# Patient Record
Sex: FEMALE | Race: ASIAN | Employment: UNEMPLOYED | URBAN - METROPOLITAN AREA
[De-identification: names, ages, dates, MRNs, and addresses within clinical notes are randomized per-mention and may not be internally consistent; named-entity substitution may affect disease eponyms.]

---

## 2016-04-13 LAB — CREATININE, EXTERNAL: 0.61

## 2017-01-11 ENCOUNTER — OFFICE VISIT (OUTPATIENT)
Dept: ENDOCRINOLOGY | Age: 52
End: 2017-01-11

## 2017-01-11 VITALS
TEMPERATURE: 98 F | HEART RATE: 81 BPM | WEIGHT: 130 LBS | SYSTOLIC BLOOD PRESSURE: 129 MMHG | DIASTOLIC BLOOD PRESSURE: 74 MMHG | BODY MASS INDEX: 22.2 KG/M2 | HEIGHT: 64 IN | RESPIRATION RATE: 16 BRPM | OXYGEN SATURATION: 97 %

## 2017-01-11 DIAGNOSIS — E04.2 NONTOXIC MULTINODULAR GOITER: Primary | ICD-10-CM

## 2017-01-11 DIAGNOSIS — E05.90 SUBCLINICAL HYPERTHYROIDISM: ICD-10-CM

## 2017-01-11 RX ORDER — GLUCOSAMINE SULFATE 1500 MG
POWDER IN PACKET (EA) ORAL DAILY
COMMUNITY
End: 2017-03-17 | Stop reason: ALTCHOICE

## 2017-01-11 RX ORDER — BISMUTH SUBSALICYLATE 262 MG
1 TABLET,CHEWABLE ORAL DAILY
COMMUNITY
End: 2017-03-17 | Stop reason: ALTCHOICE

## 2017-01-11 NOTE — PATIENT INSTRUCTIONS
Thyroid Nodules: Care Instructions  Your Care Instructions  Thyroid nodules are growths or lumps in the thyroid gland. Your thyroid is in the front of your neck. It controls how your body uses energy. You may have tests to see if the nodule is caused by cancer. Most nodules aren't cancer and don't cause problems. Many don't even need treatment. If you do have cancer, it can usually be cured. Treatment will probably include surgery. You may also get radioactive iodine treatment. If your thyroid can't make thyroid hormone after treatment, you can take a pill every day to replace the hormone. Follow-up care is a key part of your treatment and safety. Be sure to make and go to all appointments, and call your doctor if you are having problems. It's also a good idea to know your test results and keep a list of the medicines you take. How can you care for yourself at home? · Be safe with medicines. If you take thyroid hormone medicine:  ¨ Take it exactly as prescribed. Call your doctor if you think you are having a problem with your medicine. If you take the right amount and don't skip doses, you probably won't have side effects. ¨ Do not take it with calcium, vitamins, or iron. ¨ Try not to miss a dose. ¨ Do not take extra doses. This will not help you get better any faster. It may also cause side effects. ¨ Tell your doctor about any medicines you take. This includes over-the-counter medicines. ¨ Wear a medical alert bracelet or necklace that says you take thyroid hormones. You can buy these at most drugstores. When should you call for help? Call 911 anytime you think you may need emergency care. For example, call if:  · You lose consciousness. Call your doctor now or seek immediate medical care if:  · You have shortness of breath. Watch closely for changes in your health, and be sure to contact your doctor if:  · You have pain in your neck, jaw, or ear. · You have problems swallowing.   · You have a \"tickle\" in your throat. · You feel weak and tired. · You have nervousness, a fast heartbeat, hand tremors, problems sleeping, increased sweating, and weight loss. · You do not feel better even though you are taking your medicine. Where can you learn more? Go to http://robi-antonio.info/. Enter C522 in the search box to learn more about \"Thyroid Nodules: Care Instructions. \"  Current as of: July 28, 2016  Content Version: 11.1  © 6194-1593 [employer]. Care instructions adapted under license by [employer] (which disclaims liability or warranty for this information). If you have questions about a medical condition or this instruction, always ask your healthcare professional. [address] any warranty or liability for your use of this information. Fine-Needle Thyroid Biopsy: Before Your Procedure  What is a needle thyroid biopsy? During a thyroid biopsy, your doctor uses a thin needle to remove a small sample of tissue from your thyroid gland. You may be having the biopsy to find what is causing a lump or growth in your thyroid. The biopsy causes very little pain. But your doctor may need to put the needle into your thyroid more than once. This is done to be sure enough fluid and tissue is taken for the test.  The doctor then looks at the tissue sample under a microscope for cancer, infection, or other thyroid problems. The biopsy is done in a hospital, a clinic, or your doctor's office. During the test, you will lie on your back with a pillow under your shoulders. Your head will be tipped backward and your neck extended. This position pushes the thyroid gland forward. This makes it easier to do the biopsy. You may be given medicine to help you relax. Your doctor may use an ultrasound to guide the placement of the needle. It is important to lie very still during the biopsy.  Do not cough, talk, or swallow when the needle is in place. In some cases, thyroid surgery may be needed if a needle biopsy doesn't give a clear result. This would be done at a different time. In this surgery, the doctor takes a tissue sample through a cut (incision) in the skin. Follow-up care is a key part of your treatment and safety. Be sure to make and go to all appointments, and call your doctor if you are having problems. It's also a good idea to know your test results and keep a list of the medicines you take. What happens before the procedure? Procedures can be stressful. This information will help you understand what you can expect. And it will help you safely prepare for your procedure. You do not need to do anything before your biopsy. You will be awake during the biopsy. Preparing for the procedure  · Understand exactly what procedure is planned, along with the risks, benefits, and other options. · Tell your doctors ALL the medicines, vitamins, supplements, and herbal remedies you take. Some of these can increase the risk of bleeding or interact with anesthesia. · If you take blood thinners, such as warfarin (Coumadin), clopidogrel (Plavix), or aspirin, be sure to talk to your doctor. He or she will tell you if you should stop taking these medicines before your procedure. Make sure that you understand exactly what your doctor wants you to do. · Your doctor will tell you which medicines to take or stop before your procedure. You may need to stop taking certain medicines a week or more before the procedure. So talk to your doctor as soon as you can. · If you have an advance directive, let your doctor know. It may include a living will and a durable power of  for health care. Bring a copy to the hospital. If you don't have one, you may want to prepare one. It lets your doctor and loved ones know your health care wishes. Doctors advise that everyone prepare these papers before any type of surgery or procedure.   What happens on the day of the procedure? · Follow the instructions exactly about when to stop eating and drinking. If you don't, your procedure may be canceled. If your doctor told you to take your medicines on the day of the procedure, take them with only a sip of water. · Take a bath or shower before you come in for your procedure. Do not apply lotions, perfumes, deodorants, or nail polish. · Take off all jewelry and piercings. And take out contact lenses, if you wear them. At the hospital or surgery center  · Bring a picture ID. · The procedure will take about 5 to 10 minutes. Going home  · You may need to stay in the hospital overnight. · Be sure you have someone to drive you home. Anesthesia and pain medicine make it unsafe for you to drive. · You will be given more specific instructions about recovering from your procedure. They will cover things like diet, wound care, follow-up care, driving, and getting back to your normal routine. When should you call your doctor? · You have questions or concerns. · You don't understand how to prepare for your procedure. · You become ill before the procedure (such as fever, flu, or a cold). · You need to reschedule or have changed your mind about having the procedure. Where can you learn more? Go to http://robi-antonio.info/. Enter J510 in the search box to learn more about \"Fine-Needle Thyroid Biopsy: Before Your Procedure. \"  Current as of: July 28, 2016  Content Version: 11.1  © 0387-3558 [employer]. Care instructions adapted under license by [employer] (which disclaims liability or warranty for this information). If you have questions about a medical condition or this instruction, always ask your healthcare professional. [place] [address] any warranty or liability for your use of this information.

## 2017-01-11 NOTE — PROGRESS NOTES
Abena Esquivel MD              [address] [id number]           Patient Information  Date:1/11/2017  Name : Don Rock 46 y.o.     YOB: 1965         Referred by: Jeff Garcia MD         Chief Complaint   Patient presents with    Thyroid Problem       History of present illness    Don Rock is a 46 y.o. female  here for evaluation of hyperthyroidism. she was found to have abnormal thyroid function tests and Multinodular goiter   She is on [place] medicines which she gets from [facility] , there are no ingredients and she does not what they have  She was on Lithium for 30 years which was stopped in Sept 2016 , also on [place]  Has not been losing weight . Reports no nervousness,shakiness,palpitations  no heat intolerance. Bowels are regular. No A fib or osteoporosis  No recent illness . No iodine exposure   No change in the size of the neck or neck pain. No dysphagia,dysphonia or dyspnea. No history of known radiation exposure    No FH of thyroid disease. No FH of thyroid cancer     History reviewed. No pertinent past medical history. Current Outpatient Prescriptions   Medication Sig    multivitamin (ONE A DAY) tablet Take 1 Tab by mouth daily.  omega-3 fatty acids-vitamin e (FISH OIL) 1,000 mg cap Take 1 Cap by mouth.  cholecalciferol (VITAMIN D3) 1,000 unit cap Take  by mouth daily. No current facility-administered medications for this visit.           Review of Systems:  - Constitutional Symptoms: no fevers, chills,weight loss  - Eyes: no blurry vision or double vision  - Cardiovascular: no chest pain ,palpitations  - Respiratory: no cough or shortness of breath  - Gastrointestinal: no dysphagia or abdominal pain  - Musculoskeletal: + joint pains or weakness  - Integumentary: no rashes  - Neurological: no numbness, tingling, or headaches  - Psychiatric: no depression or anxiety  - Endocrine: no heat or cold intolerance, no polyuria or polydipsia    Physical Examination:  Blood pressure 129/74, pulse 81, temperature 98 °F (36.7 °C), temperature source Oral, resp. rate 16, height 5' 4\" (1.626 m), weight 130 lb (59 kg), SpO2 97 %. Body mass index is 22.31 kg/(m^2). - General: pleasant, no distress, good eye contact  - HEENT: no exopthalmos, no periorbital edema, no scleral/conjunctival injection, EOMI, no lid lag or stare  - Neck: supple, + thyromegaly, right thyroid nodule 2 - 3 cm ,non tender , no bruit   - Cardiovascular:regular,  normal S1 and S2, no murmurs  - Respiratory: clear to auscultation bilaterally  - Gastrointestinal: soft, nontender, nondistended, BS +  - Musculoskeletal: no proximal muscle weakness in upper or lower extremities  - Integumentary: tremors, no edema  - Neurological: alert and oriented   - Psychiatric: normal mood and affect  - Skin - normal turgor    Data Reviewed:        TSh was suppressed ,Ft4 nl     [] Reviewed labs    Assessment/Plan:     1. Nontoxic multinodular goiter    2. Subclinical hyperthyroidism      She has multinodular goiter and subclinical hyperthyroidism. She is on seaweed, kelp, history of Lithium use and also on Chinese medications for thyroid support and there is no way to know the ingredients on that. She thinks it has helped her thyroid condition and will not stop the medications. Chemically, no hyperadrenergic symptoms, no recent thyroid illness. Her TSH in April was slightly low. It could be either have autonomous thyroid nodule vs exogenous supplementation. Check thyroid uptake and scan. If it is a cold nodule, she needs fine needle aspiration biopsy. If she is on exogenous thyroid supplements in the form of Chinese medications, she will have very low  thyroid uptake. Discussed natural course of thyroid nodules. She has no compressive symptoms.             Follow-up Disposition:  Return in about 4 weeks (around 2/8/2017). Thank you for allowing me to participate in the care of this patient.     Laura Medrano MD      Patient verbalized understanding

## 2017-01-11 NOTE — PROGRESS NOTES
Wt Readings from Last 3 Encounters:   01/11/17 130 lb (59 kg)     Temp Readings from Last 3 Encounters:   01/11/17 98 °F (36.7 °C) (Oral)     BP Readings from Last 3 Encounters:   01/11/17 129/74     Pulse Readings from Last 3 Encounters:   01/11/17 81

## 2017-01-23 ENCOUNTER — HOSPITAL ENCOUNTER (OUTPATIENT)
Dept: NUCLEAR MEDICINE | Age: 52
Discharge: HOME | End: 2017-01-23
Attending: INTERNAL MEDICINE
Payer: OTHER GOVERNMENT

## 2017-01-23 DIAGNOSIS — E04.2 NONTOXIC MULTINODULAR GOITER: ICD-10-CM

## 2017-01-23 DIAGNOSIS — E05.90 SUBCLINICAL HYPERTHYROIDISM: ICD-10-CM

## 2017-01-24 ENCOUNTER — HOSPITAL ENCOUNTER (OUTPATIENT)
Dept: NUCLEAR MEDICINE | Age: 52
Discharge: HOME | End: 2017-01-24
Attending: INTERNAL MEDICINE
Payer: OTHER GOVERNMENT

## 2017-01-24 PROCEDURE — 78014 THYROID IMAGING W/BLOOD FLOW: CPT

## 2017-01-30 ENCOUNTER — TELEPHONE (OUTPATIENT)
Dept: ENDOCRINOLOGY | Age: 52
End: 2017-01-30

## 2017-01-30 NOTE — TELEPHONE ENCOUNTER
Informed pt that results will be discussed in detail at follow up appt in Feb per dr Andrey Wright. Asked pt to call with questions or concerns.

## 2017-02-08 ENCOUNTER — OFFICE VISIT (OUTPATIENT)
Dept: ENDOCRINOLOGY | Age: 52
End: 2017-02-08

## 2017-02-08 VITALS
SYSTOLIC BLOOD PRESSURE: 121 MMHG | HEIGHT: 64 IN | HEART RATE: 94 BPM | TEMPERATURE: 98.7 F | RESPIRATION RATE: 16 BRPM | DIASTOLIC BLOOD PRESSURE: 79 MMHG | BODY MASS INDEX: 22.53 KG/M2 | WEIGHT: 132 LBS

## 2017-02-08 DIAGNOSIS — E05.90 SUBCLINICAL HYPERTHYROIDISM: Primary | ICD-10-CM

## 2017-02-08 DIAGNOSIS — E05.90 SUBCLINICAL HYPERTHYROIDISM: ICD-10-CM

## 2017-02-08 DIAGNOSIS — E04.2 NONTOXIC MULTINODULAR GOITER: Primary | ICD-10-CM

## 2017-02-08 RX ORDER — METHIMAZOLE 5 MG/1
5 TABLET ORAL
Qty: 30 TAB | Refills: 5 | Status: SHIPPED | OUTPATIENT
Start: 2017-02-08 | End: 2017-05-02 | Stop reason: SDUPTHER

## 2017-02-08 NOTE — PATIENT INSTRUCTIONS
Potential side effects of methimazole are rash,low blood count and rarely liver inflammation. If you gets high fevers,bad sorethroat,or sores in the mouth ,please call the office for blood tests. If white blood count is low,the methimazole should be stopped and the counts will normalize in 7 to 10 days.

## 2017-02-08 NOTE — PROGRESS NOTES
Josh Cardoso MD              [address] [id number]           Patient Information  Date:2/8/2017  Name : Brian Fu 46 y.o.     YOB: 1965         Referred by: Siena Huddleston MD         Chief Complaint   Patient presents with    Thyroid Problem     4 week f/u       History of present illness    Brian Fu is a 46 y.o. female  here for  Fu of hyperthyroidism. she was found to have abnormal thyroid function tests and Multinodular goiter   She is on [place] medicines which she gets from [facility] , there are no ingredients and she does not what they have  She was on Lithium for 30 years which was stopped in Sept 2016 , also on [facility]     TSH was low   Thyroid uptake and scan - hyperfunctioning right thyroid nodule -       No FH of thyroid disease. No FH of thyroid cancer     No past medical history on file. Current Outpatient Prescriptions   Medication Sig    multivitamin (ONE A DAY) tablet Take 1 Tab by mouth daily.  omega-3 fatty acids-vitamin e (FISH OIL) 1,000 mg cap Take 1 Cap by mouth.  cholecalciferol (VITAMIN D3) 1,000 unit cap Take  by mouth daily. No current facility-administered medications for this visit. Review of Systems:  - Gastrointestinal: no dysphagia or abdominal pain  - Musculoskeletal: + joint pains or weakness  - Integumentary: no rashes  - Neurological: no numbness, tingling, or headaches  - Psychiatric: no depression or anxiety  - Endocrine: no heat or cold intolerance, no polyuria or polydipsia    Physical Examination:  Blood pressure 121/79, pulse 94, temperature 98.7 °F (37.1 °C), temperature source Oral, resp. rate 16, height 5' 4\" (1.626 m), weight 132 lb (59.9 kg). Body mass index is 22.66 kg/(m^2).   - General: pleasant, no distress, good eye contact  - HEENT: no exopthalmos, no periorbital edema, no scleral/conjunctival injection, EOMI, no lid lag or stare  - Neck: supple, + thyromegaly, right thyroid nodule 2 - 3 cm ,non tender , no bruit   - Cardiovascular:regular,  normal S1 and S2,   - Respiratory: clear to auscultation bilaterally  - Gastrointestinal: soft, nontender, nondistended, BS +  - Neurological: alert and oriented   - Psychiatric: normal mood and affect  - Skin - normal turgor    Data Reviewed:     Hyperfunctioning nodule in the right mid to lower pole suppressing  the remainder of the gland. Thyroid uptake measures 18.3% which is within normal  limits. [] Reviewed labs    Assessment/Plan:     1. Nontoxic multinodular goiter    2. Subclinical hyperthyroidism      Thyroid uptake and scan - hyperfunctioning right thyroid nodule -   Autonomous thyroid nodule -  QUIÑONES Vs medications , declined QUIÑONES     Methimazole , if no change in the nodule post euthyroid state she needs biopsy       Discussed natural course of thyroid nodules. She has no compressive symptoms. Follow-up Disposition: Not on File    Thank you for allowing me to participate in the care of this patient.     Orquidea Ferrell MD      Patient verbalized understanding

## 2017-02-08 NOTE — PROGRESS NOTES
Odilia Brady is a 46 y.o. female here for   Chief Complaint   Patient presents with    Thyroid Problem     4 week f/u       Wt Readings from Last 3 Encounters:   01/11/17 130 lb (59 kg)     Temp Readings from Last 3 Encounters:   01/11/17 98 °F (36.7 °C) (Oral)     BP Readings from Last 3 Encounters:   01/11/17 129/74     Pulse Readings from Last 3 Encounters:   01/11/17 81

## 2017-02-08 NOTE — MR AVS SNAPSHOT
Visit Information Date & Time Provider Department Dept. Phone Encounter #  
 2/8/2017  1:15 PM Ryan Fontana MD [facility] Diabetes & Endocrinology [phone] [id number] Follow-up Instructions Return in about 2 months (around 4/8/2017). Upcoming Health Maintenance Date Due Hepatitis C Screening 1965 DTaP/Tdap/Td series (1 - Tdap) 2/5/1986 PAP AKA CERVICAL CYTOLOGY 2/5/1986 BREAST CANCER SCRN MAMMOGRAM 2/5/2015 FOBT Q 1 YEAR AGE 50-75 2/5/2015 INFLUENZA AGE 9 TO ADULT 8/1/2016 Allergies as of 2/8/2017  Review Complete On: 2/8/2017 By: Ryan Fontana MD  
 No Known Allergies Current Immunizations  Never Reviewed No immunizations on file. Not reviewed this visit You Were Diagnosed With   
  
 Codes Comments Nontoxic multinodular goiter    -  Primary ICD-10-CM: E04.2 ICD-9-CM: 241.1 Subclinical hyperthyroidism     ICD-10-CM: E05.90 ICD-9-CM: 242.90 Vitals BP Pulse Temp Resp Height(growth percentile) Weight(growth percentile) 121/79 (BP 1 Location: Right arm, BP Patient Position: Sitting) 94 98.7 °F (37.1 °C) (Oral) 16 5' 4\" (1.626 m) 132 lb (59.9 kg) BMI OB Status Smoking Status 22.66 kg/m2 Hysterectomy Never Smoker Vitals History BMI and BSA Data Body Mass Index Body Surface Area  
 22.66 kg/m 2 1.64 m 2 Preferred Pharmacy Pharmacy Name Phone [facility] [address] [facility] [phone] Your Updated Medication List  
  
   
This list is accurate as of: 2/8/17  1:56 PM.  Always use your most recent med list.  
  
  
  
  
 FISH OIL 1,000 mg Cap Generic drug:  omega-3 fatty acids-vitamin e Take 1 Cap by mouth.  
  
 multivitamin tablet Commonly known as:  ONE A DAY Take 1 Tab by mouth daily. VITAMIN D3 1,000 unit Cap Generic drug:  cholecalciferol Take  by mouth daily. Follow-up Instructions Return in about 2 months (around 4/8/2017). Patient Instructions Potential side effects of methimazole are rash,low blood count and rarely liver inflammation. If you gets high fevers,bad sorethroat,or sores in the mouth ,please call the office for blood tests. If white blood count is low,the methimazole should be stopped and the counts will normalize in 7 to 10 days. Introducing [facility]! Mark Bryan introduces Avectra patient portal. Now you can access parts of your medical record, email your doctor's office, and request medication refills online. 1. In your internet browser, go to [email or web address] ([email or web address]). [email or web address] ([employer]lipso) 2. Click on the First Time User? Click Here link in the Sign In box. You will see the New Member Sign Up page. 3. Enter your Avectra Access Code exactly as it appears below. You will not need to use this code after youve completed the sign-up process. If you do not sign up before the expiration date, you must request a new code. · Avectra Access Code: [email or web address] Expires: 4/12/2017  9:18 AM 
 
4. Enter the last four digits of your Social Security Number (xxxx) and Date of Birth (mm/dd/yyyy) as indicated and click Submit. You will be taken to the next sign-up page. 5. Create a Avectra ID. This will be your Avectra login ID and cannot be changed, so think of one that is secure and easy to remember. 6. Create a Avectra password. You can change your password at any time. 7. Enter your Password Reset Question and Answer. This can be used at a later time if you forget your password. 8. Enter your e-mail address. You will receive e-mail notification when new information is available in [address] 9. Click Sign Up. You can now view and download portions of your medical record. 10. Click the Download Summary menu link to download a portable copy of your medical information. If you have questions, please visit the Frequently Asked Questions section of the [employer] website. Remember, [employer] is NOT to be used for urgent needs. For medical emergencies, dial 911. Now available from your iPhone and Android! Please provide this summary of care documentation to your next provider. Your primary care clinician is listed as Pita Ortiz. If you have any questions after today's visit, please call [phone].

## 2017-03-08 ENCOUNTER — TELEPHONE (OUTPATIENT)
Dept: ENDOCRINOLOGY | Age: 52
End: 2017-03-08

## 2017-03-08 DIAGNOSIS — M25.50 ARTHRALGIA, UNSPECIFIED JOINT: Primary | ICD-10-CM

## 2017-03-08 NOTE — TELEPHONE ENCOUNTER
Patient came in stating that she is having sharp stabbing pain in her knee; she also stated she's having the same type of pain her a tooth. She went to dentist las week and was told she doesn't have any cavities. She thinks that the pain could be a side effect from her thyroid medication and is really worried about it.

## 2017-03-09 ENCOUNTER — TELEPHONE (OUTPATIENT)
Dept: ENDOCRINOLOGY | Age: 52
End: 2017-03-09

## 2017-03-09 NOTE — TELEPHONE ENCOUNTER
Patient says she is in pain and stopped thyroid medication this morning because her pain is too severe. Patient would like a call back to soon.

## 2017-03-09 NOTE — TELEPHONE ENCOUNTER
Informed pt she is to have labs as she on a small dose. Pt states she can come in for labs today.  Order placed for pt,  per Verbal Order with read back from Dr Ellie Padilla 3/9/2017

## 2017-03-09 NOTE — TELEPHONE ENCOUNTER
She is on a very small dose    Lets check   ANCA panel   And , ESTRELLITA - arthritis test to see   Dx Arthralgia

## 2017-03-13 ENCOUNTER — TELEPHONE (OUTPATIENT)
Dept: ENDOCRINOLOGY | Age: 52
End: 2017-03-13

## 2017-03-13 NOTE — TELEPHONE ENCOUNTER
Informed pt of Dr. Travis Cool result note. Pt verbalized understanding with no further questions or concerns at this time. F/u appt confirmed.

## 2017-03-13 NOTE — TELEPHONE ENCOUNTER
----- Message from Riya De Jesus MD sent at 3/12/2017  5:57 PM EDT -----  According to labs , it doesnot look like Methimazole is not the cause for joint pains   The dose is very small , and we will discuss more at visit

## 2017-03-17 ENCOUNTER — OFFICE VISIT (OUTPATIENT)
Dept: ENDOCRINOLOGY | Age: 52
End: 2017-03-17

## 2017-03-17 VITALS
WEIGHT: 130.9 LBS | DIASTOLIC BLOOD PRESSURE: 63 MMHG | HEIGHT: 64 IN | BODY MASS INDEX: 22.35 KG/M2 | TEMPERATURE: 98 F | RESPIRATION RATE: 16 BRPM | SYSTOLIC BLOOD PRESSURE: 115 MMHG | HEART RATE: 84 BPM

## 2017-03-17 DIAGNOSIS — E04.2 MULTINODULAR GOITER: ICD-10-CM

## 2017-03-17 DIAGNOSIS — E04.1 AUTONOMOUS THYROID NODULE: ICD-10-CM

## 2017-03-17 DIAGNOSIS — E05.90 SUBCLINICAL HYPERTHYROIDISM: Primary | ICD-10-CM

## 2017-03-17 NOTE — MR AVS SNAPSHOT
Visit Information Date & Time Provider Department Dept. Phone Encounter #  
 3/17/2017  3:45 PM Sabino Tinoco MD [facility] [phone] [id number] Follow-up Instructions Return in about 2 months (around 5/17/2017). Your Appointments 3/31/2017  1:45 PM  
LAB with CDE NURSE [facility] Appt Note: labs [address] [address] [facility] [address]  
[phone]  
  
   
 [address] [facility] [address]  
  
    
 4/7/2017  2:15 PM  
ROUTINE CARE with Sabino Tinoco MD  
[facility]) Appt Note: 2mo fu  
 [address] [facility] [address]  
[phone]  
  
   
 [address] [facility] [address] Upcoming Health Maintenance Date Due Hepatitis C Screening 1965 DTaP/Tdap/Td series (1 - Tdap) 2/5/1986 PAP AKA CERVICAL CYTOLOGY 2/5/1986 BREAST CANCER SCRN MAMMOGRAM 2/5/2015 FOBT Q 1 YEAR AGE 50-75 2/5/2015 INFLUENZA AGE 9 TO ADULT 8/1/2016 Allergies as of 3/17/2017  Review Complete On: 3/17/2017 By: Sabino Tinoco MD  
 No Known Allergies Current Immunizations  Never Reviewed No immunizations on file. Not reviewed this visit You Were Diagnosed With   
  
 Codes Comments Subclinical hyperthyroidism    -  Primary ICD-10-CM: E05.90 ICD-9-CM: 242.90 Multinodular goiter     ICD-10-CM: E04.2 ICD-9-CM: 287. 1 Vitals BP Pulse Temp Resp Height(growth percentile) Weight(growth percentile)  
 115/63 (BP 1 Location: Right arm, BP Patient Position: Sitting) 84 98 °F (36.7 °C) (Oral) 16 5' 4\" (1.626 m) 130 lb 14.4 oz (59.4 kg) BMI OB Status Smoking Status 22.47 kg/m2 Hysterectomy Never Smoker BMI and BSA Data Body Mass Index Body Surface Area  
 22.47 kg/m 2 1.64 m 2 Preferred Pharmacy Pharmacy Name Phone [facility] [address] [facility] [phone] Your Updated Medication List  
  
   
This list is accurate as of: 3/17/17  4:00 PM.  Always use your most recent med list.  
  
  
  
  
 methIMAzole 5 mg tablet Commonly known as:  TAPAZOLE Take 1 Tab by mouth every morning. Follow-up Instructions Return in about 2 months (around 5/17/2017). To-Do List   
 03/27/2017 Imaging:  NM RADIONUCLIDE THERAPY ORAL Introducing [facility] & HEALTH SERVICES! Ricarda Spear introduces [employer] patient portal. Now you can access parts of your medical record, email your doctor's office, and request medication refills online. 1. In your internet browser, go to [email or web address] (now [email or web address]). [email or web address] (now [employer]) 2. Click on the First Time User? Click Here link in the Sign In box. You will see the New Member Sign Up page. 3. Enter your [employer] Access Code exactly as it appears below. You will not need to use this code after youve completed the sign-up process. If you do not sign up before the expiration date, you must request a new code. · [employer] Access Code: [email or web address] Expires: 4/12/2017 10:18 AM 
 
4. Enter the last four digits of your Social Security Number (xxxx) and Date of Birth (mm/dd/yyyy) as indicated and click Submit. You will be taken to the next sign-up page. 5. Create a Janalakshmi ID. This will be your [employer]mi login ID and cannot be changed, so think of one that is secure and easy to remember. 6. Create a Jan[employer]akshmi password. You can change your password at any time. 7. Enter your Password Reset Question and Answer. This can be used at a later time if you forget your password. 8. Enter your e-mail address. You will receive e-mail notification when new information is available in [address] 9. Click Sign Up. You can now view and download portions of your medical record. 10. Click the Download Summary menu link to download a portable copy of your medical information. If you have questions, please visit the Frequently Asked Questions section of the [employer] website. Remember, [employer] is NOT to be used for urgent needs. For medical emergencies, dial 911. Now available from your iPhone and Android! Please provide this summary of care documentation to your next provider. Your primary care clinician is listed as Aylin Argueta. If you have any questions after today's visit, please call [phone].

## 2017-03-17 NOTE — PROGRESS NOTES
Abhay Suarez is a 46 y.o. female here for   Chief Complaint   Patient presents with    Thyroid Problem       Wt Readings from Last 3 Encounters:   02/08/17 132 lb (59.9 kg)   01/11/17 130 lb (59 kg)     Temp Readings from Last 3 Encounters:   02/08/17 98.7 °F (37.1 °C) (Oral)   01/11/17 98 °F (36.7 °C) (Oral)     BP Readings from Last 3 Encounters:   02/08/17 121/79   01/11/17 129/74     Pulse Readings from Last 3 Encounters:   02/08/17 94   01/11/17 81

## 2017-03-17 NOTE — PROGRESS NOTES
Josh Cardoso MD              [address] [address]           Patient Information  Date:3/18/2017  Name : Brian Fu 46 y.o.     YOB: 1965         Referred by: Siena Huddleston MD         Chief Complaint   Patient presents with    Thyroid Problem       History of present illness    Brian Fu is a 46 y.o. female  here for  Fu of hyperthyroidism. she was found to have abnormal thyroid function tests and Multinodular goiter   She is on [place] medicines which she gets from [facility] , there are no ingredients and she does not what they have  She was on Lithium for 30 years which was stopped in Sept 2016 , also on [place]   She was started on Methimazole    Reports tooth ache - seen Dentist and knee pain which she attibutes to Methimazole   Self stopped medication   Thyroid uptake and scan - hyperfunctioning right thyroid nodule -       No FH of thyroid disease. No FH of thyroid cancer     Past Medical History:   Diagnosis Date    Autonomous thyroid nodule 3/18/2017    Multinodular goiter 3/18/2017    Subclinical hyperthyroidism 1/11/2017       Current Outpatient Prescriptions   Medication Sig    methIMAzole (TAPAZOLE) 5 mg tablet Take 1 Tab by mouth every morning. No current facility-administered medications for this visit. Review of Systems:  - Gastrointestinal: no dysphagia or abdominal pain  - Musculoskeletal: + joint pains or weakness  - Integumentary: no rashes  - Neurological: no numbness, tingling, or headaches  - Psychiatric: no depression or anxiety  - Endocrine: no heat or cold intolerance, no polyuria or polydipsia    Physical Examination:  Blood pressure 115/63, pulse 84, temperature 98 °F (36.7 °C), temperature source Oral, resp. rate 16, height 5' 4\" (1.626 m), weight 130 lb 14.4 oz (59.4 kg). Body mass index is 22.47 kg/(m^2).   - General: pleasant, no distress, good eye contact  - HEENT: no exopthalmos, no periorbital edema, no scleral/conjunctival injection, EOMI, no lid lag or stare  - Neck: supple, + thyromegaly, right thyroid nodule 2 - 3 cm ,non tender , no bruit   - Cardiovascular:regular,  normal S1 and S2,   - Respiratory: clear to auscultation bilaterally  - Gastrointestinal: soft, nontender, nondistended, BS +  - Neurological: alert and oriented   - Psychiatric: normal mood and affect  - Skin - normal turgor    Data Reviewed:     Hyperfunctioning nodule in the right mid to lower pole suppressing  the remainder of the gland. Thyroid uptake measures 18.3% which is within normal  limits. [] Reviewed labs    Assessment/Plan:     1. Subclinical hyperthyroidism    2. Multinodular goiter    3. Autonomous thyroid nodule      Thyroid uptake and scan - hyperfunctioning right thyroid nodule -   Autonomous thyroid nodule -  QUIÑONES Vs medications , initially declined QUIÑONES   Now reports non specific side effects , she is on a very small dose, ANCA, ESTRELLITA negative  Doubt the tooth ache is due to Methimazole  Stopped MMI   QUIÑONES planned         Discussed natural course of thyroid nodules. She has no compressive symptoms. Follow-up Disposition:  Return in about 2 months (around 5/17/2017). Thank you for allowing me to participate in the care of this patient.     Bhavana Hahn MD      Patient verbalized understanding

## 2017-03-18 PROBLEM — E04.2 NONTOXIC MULTINODULAR GOITER: Status: RESOLVED | Noted: 2017-01-11 | Resolved: 2017-03-18

## 2017-03-18 PROBLEM — E04.2 MULTINODULAR GOITER: Status: ACTIVE | Noted: 2017-03-18

## 2017-03-18 PROBLEM — E04.1 AUTONOMOUS THYROID NODULE: Status: ACTIVE | Noted: 2017-03-18

## 2017-03-22 ENCOUNTER — TELEPHONE (OUTPATIENT)
Dept: ENDOCRINOLOGY | Age: 52
End: 2017-03-22

## 2017-03-22 NOTE — TELEPHONE ENCOUNTER
Pt called stating that she wants to schedule an appt. She was just seen last week. She was very unclear as to why she wanted to be seen, and she was talking so fast I could not understand her.

## 2017-03-24 ENCOUNTER — TELEPHONE (OUTPATIENT)
Dept: ENDOCRINOLOGY | Age: 52
End: 2017-03-24

## 2017-03-24 DIAGNOSIS — E05.90 HYPERTHYROIDISM: Primary | ICD-10-CM

## 2017-03-24 NOTE — TELEPHONE ENCOUNTER
Please call wants to know how long she should be taking the Thyriod med. Please call to go into detail. Thank you.

## 2017-03-24 NOTE — TELEPHONE ENCOUNTER
Pt states she discovered her pain is from another source and not the thyroid medication so she has decided against the thyroid scan and wants to be seen some time in April to have labs checked to determine what dose of medication she needs. She stated she has an appt in June but she does not want to wait that long and [place]. She asks what can be done?

## 2017-05-02 ENCOUNTER — OFFICE VISIT (OUTPATIENT)
Dept: ENDOCRINOLOGY | Age: 52
End: 2017-05-02

## 2017-05-02 VITALS
BODY MASS INDEX: 21.92 KG/M2 | SYSTOLIC BLOOD PRESSURE: 120 MMHG | WEIGHT: 128.4 LBS | HEART RATE: 74 BPM | RESPIRATION RATE: 16 BRPM | TEMPERATURE: 97.8 F | HEIGHT: 64 IN | DIASTOLIC BLOOD PRESSURE: 77 MMHG

## 2017-05-02 DIAGNOSIS — E05.90 SUBCLINICAL HYPERTHYROIDISM: Primary | ICD-10-CM

## 2017-05-02 DIAGNOSIS — E04.2 MULTINODULAR GOITER: ICD-10-CM

## 2017-05-02 DIAGNOSIS — E05.90 SUBCLINICAL HYPERTHYROIDISM: ICD-10-CM

## 2017-05-02 DIAGNOSIS — E04.1 AUTONOMOUS THYROID NODULE: ICD-10-CM

## 2017-05-02 RX ORDER — METHIMAZOLE 5 MG/1
TABLET ORAL
Qty: 30 TAB | Refills: 5 | Status: SHIPPED | OUTPATIENT
Start: 2017-05-02 | End: 2017-08-10 | Stop reason: SDUPTHER

## 2017-05-02 NOTE — PROGRESS NOTES
[facility]               Everett Lara MD              [address] [address]           Patient Information  Date:5/2/2017  Name : Gabi Alonso 46 y.o.     YOB: 1965         Referred by: Noble Shane MD         Chief Complaint   Patient presents with    Thyroid Problem       History of present illness    Gabi Alonso is a 46 y.o. female  here for  Fu of hyperthyroidism. she was found to have abnormal thyroid function tests and Multinodular goiter   She is on [place] medicines which she gets from [facility] , there are no ingredients and she does not what they have  She was on Lithium for 30 years which was stopped in Sept 2016 , also on [place]   She was started on Methimazole, stopped on her own and restarted - feels better  Less nervousness      Thyroid uptake and scan - hyperfunctioning right thyroid nodule -       No FH of thyroid disease. No FH of thyroid cancer     Past Medical History:   Diagnosis Date    Autonomous thyroid nodule 3/18/2017    Multinodular goiter 3/18/2017    Subclinical hyperthyroidism 1/11/2017       Current Outpatient Prescriptions   Medication Sig    OTHER Chinese medicine    methIMAzole (TAPAZOLE) 5 mg tablet Take 1 Tab by mouth every morning. No current facility-administered medications for this visit. Review of Systems:  - Gastrointestinal: no dysphagia or abdominal pain  - Musculoskeletal: + joint pains or weakness  - Integumentary: no rashes  - Neurological: no numbness, tingling, or headaches  - Psychiatric: no depression or anxiety  - Endocrine: no heat or cold intolerance, no polyuria or polydipsia    Physical Examination:  Blood pressure 120/77, pulse 74, temperature 97.8 °F (36.6 °C), temperature source Oral, resp. rate 16, height 5' 4\" (1.626 m), weight 128 lb 6.4 oz (58.2 kg). Body mass index is 22.04 kg/(m^2).   - General: pleasant, no distress, good eye contact  - HEENT: no exopthalmos, no periorbital edema, no scleral/conjunctival injection, EOMI, no lid lag or stare  - Neck: supple, + thyromegaly, right thyroid nodule 2 - 3 cm ,non tender , no bruit   - Cardiovascular:regular,  normal S1 and S2,   - Respiratory: clear to auscultation bilaterally  - Gastrointestinal: soft, nontender, nondistended, BS +  - Neurological: alert and oriented   - Psychiatric: normal mood and affect  - Skin - normal turgor    Data Reviewed:     Hyperfunctioning nodule in the right mid to lower pole suppressing  the remainder of the gland. Thyroid uptake measures 18.3% which is within normal  limits. [] Reviewed labs    Assessment/Plan:     1. Subclinical hyperthyroidism    2. Autonomous thyroid nodule    3. Multinodular goiter       Nodular goiter  Thyroid uptake and scan - hyperfunctioning right thyroid nodule -   Autonomous thyroid nodule -  QUIÑONES Vs medications , declined QUIÑONES     TSh normal   Right thyroid nodule 3 cm -   Methimazole 5 mg alternate days    She has no compressive symptoms. Follow-up Disposition: Not on File    Thank you for allowing me to participate in the care of this patient.     Abbie Landau, MD      Patient verbalized understanding

## 2017-05-02 NOTE — PROGRESS NOTES
Alden Pinto is a 46 y.o. female here for   Chief Complaint   Patient presents with    Thyroid Problem       Wt Readings from Last 3 Encounters:   03/17/17 130 lb 14.4 oz (59.4 kg)   02/08/17 132 lb (59.9 kg)   01/11/17 130 lb (59 kg)     Temp Readings from Last 3 Encounters:   03/17/17 98 °F (36.7 °C) (Oral)   02/08/17 98.7 °F (37.1 °C) (Oral)   01/11/17 98 °F (36.7 °C) (Oral)     BP Readings from Last 3 Encounters:   03/17/17 115/63   02/08/17 121/79   01/11/17 129/74     Pulse Readings from Last 3 Encounters:   03/17/17 84   02/08/17 94   01/11/17 81

## 2017-05-02 NOTE — MR AVS SNAPSHOT
Visit Information Date & Time Provider Department Dept. Phone Encounter #  
 5/2/2017  1:15 PM Sudhakar Melendez MD [facility] [phone] [id number] Follow-up Instructions Return in about 5 months (around 10/2/2017). Upcoming Health Maintenance Date Due Hepatitis C Screening 1965 DTaP/Tdap/Td series (1 - Tdap) 2/5/1986 PAP AKA CERVICAL CYTOLOGY 2/5/1986 BREAST CANCER SCRN MAMMOGRAM 2/5/2015 FOBT Q 1 YEAR AGE 50-75 2/5/2015 INFLUENZA AGE 9 TO ADULT 8/1/2017 Allergies as of 5/2/2017  Review Complete On: 5/2/2017 By: Sudhakar Melendez MD  
 No Known Allergies Current Immunizations  Never Reviewed No immunizations on file. Not reviewed this visit You Were Diagnosed With   
  
 Codes Comments Subclinical hyperthyroidism    -  Primary ICD-10-CM: E05.90 ICD-9-CM: 242.90 Autonomous thyroid nodule     ICD-10-CM: E04.9 ICD-9-CM: 241.9 Multinodular goiter     ICD-10-CM: E04.2 ICD-9-CM: 006. 1 Vitals BP Pulse Temp Resp Height(growth percentile) Weight(growth percentile) 120/77 (BP 1 Location: Right arm, BP Patient Position: Sitting) 74 97.8 °F (36.6 °C) (Oral) 16 5' 4\" (1.626 m) 128 lb 6.4 oz (58.2 kg) BMI OB Status Smoking Status 22.04 kg/m2 Hysterectomy Never Smoker Vitals History BMI and BSA Data Body Mass Index Body Surface Area 22.04 kg/m 2 1.62 m 2 Preferred Pharmacy Pharmacy Name Phone [facility] [address] [phone] Your Updated Medication List  
  
   
This list is accurate as of: 5/2/17  2:00 PM.  Always use your most recent med list.  
  
  
  
  
 methIMAzole 5 mg tablet Commonly known as:  TAPAZOLE Take 1 Tab by mouth every morning. OTHER [facility] medicine Follow-up Instructions Return in about 5 months (around 10/2/2017). Introducing [facility] & [facility]! Fang Zhang introduces [employer] patient portal. Now you can access parts of your medical record, email your doctor's office, and request medication refills online. 1. In your internet browser, go to [email or web address] 2. Click on the First Time User? Click Here link in the Sign In box. You will see the New Member Sign Up page. 3. Enter your [employer] Access Code exactly as it appears below. You will not need to use this code after youve completed the sign-up process. If you do not sign up before the expiration date, you must request a new code. · [employer] Access Code: [email or web address] Expires: 7/31/2017  2:00 PM 
 
4. Enter the last four digits of your Social Security Number (xxxx) and Date of Birth (mm/dd/yyyy) as indicated and click Submit. You will be taken to the next sign-up page. 5. Create a [employer] ID. This will be your [employer] login ID and cannot be changed, so think of one that is secure and easy to remember. 6. Create a [employer] password. You can change your password at any time. 7. Enter your Password Reset Question and Answer. This can be used at a later time if you forget your password. 8. Enter your e-mail address. You will receive e-mail notification when new information is available in [address] 9. Click Sign Up. You can now view and download portions of your medical record. 10. Click the Download Summary menu link to download a portable copy of your medical information. If you have questions, please visit the Frequently Asked Questions section of the [employer] website. Remember, [employer] is NOT to be used for urgent needs. For medical emergencies, dial 911. Now available from your iPhone and Android! Please provide this summary of care documentation to your next provider. Your primary care clinician is listed as Noble Shane. If you have any questions after today's visit, please call [phone].

## 2017-08-09 DIAGNOSIS — E05.90 SUBCLINICAL HYPERTHYROIDISM: ICD-10-CM

## 2017-08-10 RX ORDER — METHIMAZOLE 5 MG/1
TABLET ORAL
Qty: 30 TAB | Refills: 5 | Status: SHIPPED | OUTPATIENT
Start: 2017-08-10 | End: 2017-12-12 | Stop reason: ALTCHOICE

## 2017-10-10 ENCOUNTER — OFFICE VISIT (OUTPATIENT)
Dept: ENDOCRINOLOGY | Age: 52
End: 2017-10-10

## 2017-10-10 VITALS
HEIGHT: 64 IN | RESPIRATION RATE: 16 BRPM | SYSTOLIC BLOOD PRESSURE: 107 MMHG | WEIGHT: 113.8 LBS | DIASTOLIC BLOOD PRESSURE: 63 MMHG | TEMPERATURE: 97.6 F | BODY MASS INDEX: 19.43 KG/M2 | HEART RATE: 97 BPM

## 2017-10-10 DIAGNOSIS — E05.90 SUBCLINICAL HYPERTHYROIDISM: Primary | ICD-10-CM

## 2017-10-10 DIAGNOSIS — E04.2 MULTINODULAR GOITER: ICD-10-CM

## 2017-10-10 DIAGNOSIS — E04.1 AUTONOMOUS THYROID NODULE: ICD-10-CM

## 2017-10-10 NOTE — PATIENT INSTRUCTIONS
Stop the methimazole for 4 days before the treatment       Radioactive Iodine: What to Expect at Home  Your Recovery  Radioactive iodine is absorbed and concentrated by the thyroid gland. You get it in liquid or pill form. The radiation will pass out of your body through your urine within days. Until that time, you will give off radiation in your sweat, your saliva, your urine, and anything else that comes out of your body. It is important to avoid exposing other people to the radioactivity from your body. Your doctor will give you more written instructions. Follow these carefully. The instructions will tell you how far to stay away from people and how long you need to follow the precautions listed below. They will list other ways to keep other people safe. They will also tell you when it will be safe to go out, go to work, and do other activities. How can you care for yourself at home? General recommendations  · For a period of time, you will need to keep your distance from other people, especially young children and pregnant women. · Avoid close contact, kissing, and sexual activity. You may need to sleep in a separate bed from your partner. · Keep the toilet very clean. Men should urinate sitting down to avoid splashing. Flush the toilet 2 or 3 times after each use. Wash your hands well with soap and lots of water each time you use the toilet. · Rinse the bathroom sink and tub well after you use them. · Use separate towels, washcloths, and sheets. Wash these and your personal clothing by themselves. Don't wash them with other people's laundry. · You may want to use a special plastic trash bag for all your trash, such as bandages, paper or plastic dishes, menstrual pads, tissues, or paper towels. Talk to your treatment facility to see if they will handle the disposal. Or after 80 days, this bag can be thrown out with your other trash. · Wash your dishes in a  or by hand.  If you use disposable dishes, they must be thrown away in the special plastic trash bag. · Don't cook for other people. If you must cook, use plastic gloves. Then throw them away in the special plastic trash bag. Don't share cups, dishes, or utensils. Pregnancy and children  · Your doctor will tell you when it is safe to have sex and become pregnant. · You should not breastfeed your baby after you have been treated with radioactive iodine. Ask your doctor when it's safe to breastfeed. Travel  · Don't take public transportation. If you are able, it's best to drive yourself. · It is important to prepare for any problems you may have at airport security. People who have had radioactive iodine treatment can set off the radiation detection machines in airports for a week to 10 days. Check with local authorities about any steps or permission you may need to travel. · If you plan to travel on the interstate, you may set off radiation detectors. Most police and transportation workers are aware of medical radiation, but it may help to carry some paperwork from your doctor. Follow-up care is a key part of your treatment and safety. Be sure to make and go to all appointments, and call your doctor if you are having problems. It's also a good idea to know your test results and keep a list of the medicines you take. When should you call for help? Watch closely for any changes in your health, and be sure to contact your doctor if:  · You have a sore throat. · You vomit. · You have diarrhea. Where can you learn more? Go to http://robi-antonio.info/. Enter C197 in the search box to learn more about \"Radioactive Iodine: What to Expect at Home. \"  Current as of: July 28, 2016  Content Version: 11.3  © 6328-0470 [employer]. Care instructions adapted under license by [employer] (which disclaims liability or warranty for this information).  If you have questions about a medical condition or this instruction, always ask your healthcare professional. [place] [address] any warranty or liability for your use of this information.

## 2017-10-10 NOTE — PROGRESS NOTES
Dony Dejesus is a 46 y.o. female here for   Chief Complaint   Patient presents with    Ultrasound    Thyroid Problem       1. Have you been to the ER, urgent care clinic since your last visit? Hospitalized since your last visit? -no    2. Have you seen or consulted any other health care providers outside of the [address] [place] [address] since your last visit? Include any pap smears or colon screening. -PCP    Wt Readings from Last 3 Encounters:   05/02/17 128 lb 6.4 oz (58.2 kg)   03/17/17 130 lb 14.4 oz (59.4 kg)   02/08/17 132 lb (59.9 kg)     Temp Readings from Last 3 Encounters:   05/02/17 97.8 °F (36.6 °C) (Oral)   03/17/17 98 °F (36.7 °C) (Oral)   02/08/17 98.7 °F (37.1 °C) (Oral)     BP Readings from Last 3 Encounters:   05/02/17 120/77   03/17/17 115/63   02/08/17 121/79     Pulse Readings from Last 3 Encounters:   05/02/17 74   03/17/17 84   02/08/17 94

## 2017-10-10 NOTE — MR AVS SNAPSHOT
Visit Information Date & Time Provider Department Dept. Phone Encounter #  
 10/10/2017 11:30 AM Yajaira Ordaz MD [facility] [phone] [id number] Follow-up Instructions Return in about 2 months (around 12/10/2017). Upcoming Health Maintenance Date Due Hepatitis C Screening 1965 DTaP/Tdap/Td series (1 - Tdap) 2/5/1986 PAP AKA CERVICAL CYTOLOGY 2/5/1986 BREAST CANCER SCRN MAMMOGRAM 2/5/2015 FOBT Q 1 YEAR AGE 50-75 2/5/2015 INFLUENZA AGE 9 TO ADULT 8/1/2017 Allergies as of 10/10/2017  Review Complete On: 10/10/2017 By: Yajaira Ordaz MD  
 No Known Allergies Current Immunizations  Never Reviewed No immunizations on file. Not reviewed this visit You Were Diagnosed With   
  
 Codes Comments Subclinical hyperthyroidism    -  Primary ICD-10-CM: E05.90 ICD-9-CM: 242.90 Autonomous thyroid nodule     ICD-10-CM: E04.9 ICD-9-CM: 241.9 Multinodular goiter     ICD-10-CM: E04.2 ICD-9-CM: 574. 1 Vitals BP Pulse Temp Resp Height(growth percentile) Weight(growth percentile) 107/63 (BP 1 Location: Left arm, BP Patient Position: Sitting) 97 97.6 °F (36.4 °C) (Oral) 16 5' 4\" (1.626 m) 113 lb 12.8 oz (51.6 kg) BMI OB Status Smoking Status 19.53 kg/m2 Hysterectomy Never Smoker Vitals History BMI and BSA Data Body Mass Index Body Surface Area  
 19.53 kg/m 2 1.53 m 2 Preferred Pharmacy Pharmacy Name Phone [facility] [address] [phone] Your Updated Medication List  
  
   
This list is accurate as of: 10/10/17 12:35 PM.  Always use your most recent med list.  
  
  
  
  
 methIMAzole 5 mg tablet Commonly known as:  TAPAZOLE Take 5 mg alternating 2.5 mg daily OTHER [facility] medicine Follow-up Instructions Return in about 2 months (around 12/10/2017). To-Do List   
 10/11/2017 Imaging:  NM RADIONUCLIDE THERAPY ORAL Patient Instructions Stop the methimazole for 4 days before the treatment Radioactive Iodine: What to Expect at [facility] Your Recovery Radioactive iodine is absorbed and concentrated by the thyroid gland. You get it in liquid or pill form. The radiation will pass out of your body through your urine within days. Until that time, you will give off radiation in your sweat, your saliva, your urine, and anything else that comes out of your body. It is important to avoid exposing other people to the radioactivity from your body. Your doctor will give you more written instructions. Follow these carefully. The instructions will tell you how far to stay away from people and how long you need to follow the precautions listed below. They will list other ways to keep other people safe. They will also tell you when it will be safe to go out, go to work, and do other activities. How can you care for yourself at home? General recommendations · For a period of time, you will need to keep your distance from other people, especially young children and pregnant women. · Avoid close contact, kissing, and sexual activity. You may need to sleep in a separate bed from your partner. · Keep the toilet very clean. Men should urinate sitting down to avoid splashing. Flush the toilet 2 or 3 times after each use. Wash your hands well with soap and lots of water each time you use the toilet. · Rinse the bathroom sink and tub well after you use them. · Use separate towels, washcloths, and sheets. Wash these and your personal clothing by themselves. Don't wash them with other people's laundry.  
· You may want to use a special plastic trash bag for all your trash, such as bandages, paper or plastic dishes, menstrual pads, tissues, or paper towels. Talk to your treatment facility to see if they will handle the disposal. Or after 80 days, this bag can be thrown out with your other trash. · Wash your dishes in a  or by hand. If you use disposable dishes, they must be thrown away in the special plastic trash bag. · Don't cook for other people. If you must cook, use plastic gloves. Then throw them away in the special plastic trash bag. Don't share cups, dishes, or utensils. Pregnancy and children · Your doctor will tell you when it is safe to have sex and become pregnant. · You should not breastfeed your baby after you have been treated with radioactive iodine. Ask your doctor when it's safe to breastfeed. Travel · Don't take public transportation. If you are able, it's best to drive yourself. · It is important to prepare for any problems you may have at airport security. People who have had radioactive iodine treatment can set off the radiation detection machines in airports for a week to 10 days. Check with local authorities about any steps or permission you may need to travel. · If you plan to travel on the interstate, you may set off radiation detectors. Most police and transportation workers are aware of medical radiation, but it may help to carry some paperwork from your doctor. Follow-up care is a key part of your treatment and safety. Be sure to make and go to all appointments, and call your doctor if you are having problems. It's also a good idea to know your test results and keep a list of the medicines you take. When should you call for help? Watch closely for any changes in your health, and be sure to contact your doctor if: 
· You have a sore throat. · You vomit. · You have diarrhea. Where can you learn more? Go to http://robi-antonio.info/. Enter R911 in the search box to learn more about \"Radioactive Iodine: What to Expect at Home. \" Current as of: July 28, 2016 Content Version: 11.3 © 9124-1391 Healthwise, Incorporated. Care instructions adapted under license by [employer] (which disclaims liability or warranty for this information). If you have questions about a medical condition or this instruction, always ask your healthcare professional. [address] any warranty or liability for your use of this information. Introducing [facility] & HEALTH SERVICES! [employer] introduces Shaka patient portal. Now you can access parts of your medical record, email your doctor's office, and request medication refills online. 1. In your internet browser, go to [email or web address] 2. Click on the First Time User? Click Here link in the Sign In box. You will see the New Member Sign Up page. 3. Enter your Shaka Access Code exactly as it appears below. You will not need to use this code after youve completed the sign-up process. If you do not sign up before the expiration date, you must request a new code. · Shaka Access Code: [email or web address] Expires: 1/8/2018 12:35 PM 
 
4. Enter the last four digits of your Social Security Number (xxxx) and Date of Birth (mm/dd/yyyy) as indicated and click Submit. You will be taken to the next sign-up page. 5. Create a Shaka ID. This will be your Shaka login ID and cannot be changed, so think of one that is secure and easy to remember. 6. Create a Shaka password. You can change your password at any time. 7. Enter your Password Reset Question and Answer. This can be used at a later time if you forget your password. 8. Enter your e-mail address. You will receive e-mail notification when new information is available in [address] 9. Click Sign Up. You can now view and download portions of your medical record. 10. Click the Download Summary menu link to download a portable copy of your medical information.  
 
If you have questions, please visit the Frequently Asked Questions section of the [employer]. Remember, K[employer]ki[employer] is NOT to be used for urgent needs. For medical emergencies, dial 911. Now available from your iPhone and Android! Please provide this summary of care documentation to your next provider. Your primary care clinician is listed as Janet Burgos. If you have any questions after today's visit, please call [phone].

## 2017-10-10 NOTE — PROGRESS NOTES
Scott Armijo MD              [address] [id number]           Patient Information  Date:10/10/2017  Name : Mark Toure 46 y.o.     YOB: 1965         Referred by: Anna Malin MD         Chief Complaint   Patient presents with    Ultrasound    Thyroid Problem       History of present illness    Mark Toure is a 46 y.o. female  here for  Fu of hyperthyroidism. she was found to have abnormal thyroid function tests and Multinodular goiter   She is on [place] medicines which she gets from [facility] , there are no ingredients and she does not what they have  She was on Lithium for 30 years which was stopped in Sept 2016 , also on [place]   She was started on Methimazole, stopped on her own and restarted - feels better    She is missing MMI - TFTS worse  Lost weight       Thyroid uptake and scan - hyperfunctioning right thyroid nodule -       No FH of thyroid disease. No FH of thyroid cancer     Past Medical History:   Diagnosis Date    Autonomous thyroid nodule 3/18/2017    Multinodular goiter 3/18/2017    Subclinical hyperthyroidism 1/11/2017       Current Outpatient Prescriptions   Medication Sig    methIMAzole (TAPAZOLE) 5 mg tablet Take 5 mg alternating 2.5 mg daily    OTHER [facility] medicine     No current facility-administered medications for this visit. Review of Systems:  - Gastrointestinal: no dysphagia or abdominal pain  - Musculoskeletal: + joint pains or weakness  - Integumentary: no rashes  - Neurological: no numbness, tingling, or headaches  - Psychiatric: no depression or anxiety  - Endocrine: no heat or cold intolerance, no polyuria or polydipsia    Physical Examination:  Blood pressure 107/63, pulse 97, temperature 97.6 °F (36.4 °C), temperature source Oral, resp. rate 16, height 5' 4\" (1.626 m), weight 113 lb 12.8 oz (51.6 kg).     Body mass index is 19.53 kg/(m^2). - General: pleasant, no distress, good eye contact  - HEENT: no exopthalmos, no periorbital edema, no scleral/conjunctival injection, EOMI, no lid lag or stare  - Neck: supple, + thyromegaly, right thyroid nodule 2 - 3 cm ,non tender , no bruit   - Cardiovascular:regular,  normal S1 and S2,   - Respiratory: clear to auscultation bilaterally  - Gastrointestinal: soft, nontender, nondistended, BS +  - Neurological: alert and oriented   - Psychiatric: normal mood and affect  - Skin - normal turgor    Data Reviewed:     Hyperfunctioning nodule in the right mid to lower pole suppressing  the remainder of the gland. Thyroid uptake measures 18.3% which is within normal  limits. [] Reviewed labs    Assessment/Plan:     1. Subclinical hyperthyroidism    2. Autonomous thyroid nodule    3. Multinodular goiter       Nodular goiter  Thyroid uptake and scan - hyperfunctioning right thyroid nodule -   Autonomous thyroid nodule -  QUIÑONES Vs medications , declined QUIÑONES   Discussed QUIÑONES - agreed this time   Stop MMI       Right thyroid nodule 3 cm -    US 10/17 3.2 cm right thyroid nodule - autonomous - QUIÑONES    She has no compressive symptoms. Weight loss        Follow-up Disposition: Not on File    Thank you for allowing me to participate in the care of this patient.     Leesa Villalta MD      Patient verbalized understanding

## 2017-10-12 ENCOUNTER — TELEPHONE (OUTPATIENT)
Dept: ENDOCRINOLOGY | Age: 52
End: 2017-10-12

## 2017-10-12 DIAGNOSIS — E04.1 AUTONOMOUS THYROID NODULE: Primary | ICD-10-CM

## 2017-10-12 NOTE — TELEPHONE ENCOUNTER
----- Message from Torey Costa sent at 10/12/2017 12:46 PM EDT -----  Regarding: Dr Ashia Koch  Pt stated she will need an order for thyroid scans for [facility]. Contact [phone]

## 2017-12-06 ENCOUNTER — HOSPITAL ENCOUNTER (OUTPATIENT)
Dept: NUCLEAR MEDICINE | Age: 52
Discharge: HOME | End: 2017-12-06
Attending: INTERNAL MEDICINE
Payer: OTHER GOVERNMENT

## 2017-12-06 DIAGNOSIS — E04.1 AUTONOMOUS THYROID NODULE: ICD-10-CM

## 2017-12-06 PROCEDURE — 78014 THYROID IMAGING W/BLOOD FLOW: CPT

## 2017-12-07 ENCOUNTER — HOSPITAL ENCOUNTER (OUTPATIENT)
Dept: NUCLEAR MEDICINE | Age: 52
Discharge: HOME | End: 2017-12-07
Attending: INTERNAL MEDICINE
Payer: OTHER GOVERNMENT

## 2017-12-07 ENCOUNTER — HOSPITAL ENCOUNTER (OUTPATIENT)
Dept: ULTRASOUND IMAGING | Age: 52
Discharge: HOME | End: 2017-12-07
Attending: INTERNAL MEDICINE
Payer: OTHER GOVERNMENT

## 2017-12-07 DIAGNOSIS — E05.90 HYPERTHYROIDISM: ICD-10-CM

## 2017-12-07 PROCEDURE — 78014 THYROID IMAGING W/BLOOD FLOW: CPT

## 2017-12-09 NOTE — PROGRESS NOTES
I have ordered iodine therapy but not scheduled by - they have scheduled only scan 9 looks like they have missed the order

## 2017-12-12 ENCOUNTER — OFFICE VISIT (OUTPATIENT)
Dept: ENDOCRINOLOGY | Age: 52
End: 2017-12-12

## 2017-12-12 VITALS
BODY MASS INDEX: 20.69 KG/M2 | SYSTOLIC BLOOD PRESSURE: 118 MMHG | TEMPERATURE: 97.9 F | DIASTOLIC BLOOD PRESSURE: 62 MMHG | RESPIRATION RATE: 14 BRPM | HEIGHT: 64 IN | WEIGHT: 121.2 LBS | OXYGEN SATURATION: 97 % | HEART RATE: 91 BPM

## 2017-12-12 DIAGNOSIS — E04.1 AUTONOMOUS THYROID NODULE: ICD-10-CM

## 2017-12-12 DIAGNOSIS — E04.2 MULTINODULAR GOITER: ICD-10-CM

## 2017-12-12 DIAGNOSIS — E05.90 SUBCLINICAL HYPERTHYROIDISM: Primary | ICD-10-CM

## 2017-12-12 RX ORDER — BISMUTH SUBSALICYLATE 262 MG
1 TABLET,CHEWABLE ORAL DAILY
COMMUNITY

## 2017-12-12 RX ORDER — OLANZAPINE 2.5 MG/1
TABLET ORAL
COMMUNITY
End: 2018-12-07

## 2017-12-12 RX ORDER — ARIPIPRAZOLE 5 MG/1
7.5 TABLET ORAL DAILY
COMMUNITY
End: 2018-02-23 | Stop reason: ALTCHOICE

## 2017-12-12 NOTE — PATIENT INSTRUCTIONS
Radioactive Iodine: What to Expect at Home  Your Recovery  Radioactive iodine is absorbed and concentrated by the thyroid gland. You get it in liquid or pill form. The radiation will pass out of your body through your urine within days. Until that time, you will give off radiation in your sweat, your saliva, your urine, and anything else that comes out of your body. It is important to avoid exposing other people to the radioactivity from your body. Your doctor will give you more written instructions. Follow these carefully. The instructions will tell you how far to stay away from people and how long you need to follow the precautions listed below. They will list other ways to keep other people safe. They will also tell you when it will be safe to go out, go to work, and do other activities. How can you care for yourself at home? General recommendations  · For a period of time, you will need to keep your distance from other people, especially young children and pregnant women. · Avoid close contact, kissing, and sexual activity. You may need to sleep in a separate bed from your partner. · Keep the toilet very clean. Men should urinate sitting down to avoid splashing. Flush the toilet 2 or 3 times after each use. Wash your hands well with soap and lots of water each time you use the toilet. · Rinse the bathroom sink and tub well after you use them. · Use separate towels, washcloths, and sheets. Wash these and your personal clothing by themselves. Don't wash them with other people's laundry. · You may want to use a special plastic trash bag for all your trash, such as bandages, paper or plastic dishes, menstrual pads, tissues, or paper towels. Talk to your treatment facility to see if they will handle the disposal. Or after 80 days, this bag can be thrown out with your other trash. · Wash your dishes in a  or by hand.  If you use disposable dishes, they must be thrown away in the special plastic trash bag. · Don't cook for other people. If you must cook, use plastic gloves. Then throw them away in the special plastic trash bag. Don't share cups, dishes, or utensils. Pregnancy and children  · Your doctor will tell you when it is safe to have sex and become pregnant. · You should not breastfeed your baby after you have been treated with radioactive iodine. Ask your doctor when it's safe to breastfeed. Travel  · Don't take public transportation. If you are able, it's best to drive yourself. · It is important to prepare for any problems you may have at airport security. People who have had radioactive iodine treatment can set off the radiation detection machines in airports for a week to 10 days. Check with local authorities about any steps or permission you may need to travel. · If you plan to travel on the interstate, you may set off radiation detectors. Most police and transportation workers are aware of medical radiation, but it may help to carry some paperwork from your doctor. Follow-up care is a key part of your treatment and safety. Be sure to make and go to all appointments, and call your doctor if you are having problems. It's also a good idea to know your test results and keep a list of the medicines you take. When should you call for help? Watch closely for any changes in your health, and be sure to contact your doctor if:  ? · You have a sore throat. ? · You vomit. ? · You have diarrhea. Where can you learn more? Go to http://robi-antonio.info/. Enter S435 in the search box to learn more about \"Radioactive Iodine: What to Expect at Home. \"  Current as of: May 12, 2017  Content Version: 11.4  © 6383-2255 [employer]. Care instructions adapted under license by [employer] (which disclaims liability or warranty for this information).  If you have questions about a medical condition or this instruction, always ask your healthcare professional. [employer] disclaims any warranty or liability for your use of this information.

## 2017-12-12 NOTE — MR AVS SNAPSHOT
Visit Information Date & Time Provider Department Dept. Phone Encounter #  
 12/12/2017 11:30 AM Sherita King MD [facility] Diabetes & Endocrinology [phone] [id number] Follow-up Instructions Return in about 2 months (around 2/12/2018). Upcoming Health Maintenance Date Due Hepatitis C Screening 1965 DTaP/Tdap/Td series (1 - Tdap) 2/5/1986 PAP AKA CERVICAL CYTOLOGY 2/5/1986 BREAST CANCER SCRN MAMMOGRAM 2/5/2015 FOBT Q 1 YEAR AGE 50-75 2/5/2015 Influenza Age 5 to Adult 8/1/2017 Allergies as of 12/12/2017  Review Complete On: 12/12/2017 By: Sherita King MD  
 No Known Allergies Current Immunizations  Never Reviewed No immunizations on file. Not reviewed this visit You Were Diagnosed With   
  
 Codes Comments Subclinical hyperthyroidism    -  Primary ICD-10-CM: E05.90 ICD-9-CM: 242.90 Autonomous thyroid nodule     ICD-10-CM: E04.9 ICD-9-CM: 241.9 Multinodular goiter     ICD-10-CM: E04.2 ICD-9-CM: 355. 1 Vitals BP Pulse Temp Resp Height(growth percentile) Weight(growth percentile)  
 118/62 (BP 1 Location: Left arm, BP Patient Position: Sitting) 91 97.9 °F (36.6 °C) (Oral) 14 5' 4\" (1.626 m) 121 lb 3.2 oz (55 kg) SpO2 BMI OB Status Smoking Status 97% 20.8 kg/m2 Hysterectomy Never Smoker Vitals History BMI and BSA Data Body Mass Index Body Surface Area  
 20.8 kg/m 2 1.58 m 2 Preferred Pharmacy Pharmacy Name Phone [facility] [address] [phone] Your Updated Medication List  
  
   
This list is accurate as of: 12/12/17 12:19 PM.  Always use your most recent med list.  
  
  
  
  
 ABILIFY 5 mg tablet Generic drug:  ARIPiprazole Take 7.5 mg by mouth daily. multivitamin tablet Commonly known as:  ONE A DAY Take 1 Tab by mouth daily. [id number]-Fish Oil 099--462 mg-mg-mcg-mcg Cap Take  by mouth. * OTHER [facility] medicine * OTHER Take 60 mg by mouth. osphenia - 3 times a week ZyPREXA 2.5 mg tablet Generic drug:  OLANZapine Take  by mouth nightly. * Notice: This list has 2 medication(s) that are the same as other medications prescribed for you. Read the directions carefully, and ask your doctor or other care provider to review them with you. Follow-up Instructions Return in about 2 months (around 2/12/2018). To-Do List   
 12/13/2017 Imaging:  NM RADIONUCLIDE THERAPY ORAL Patient Instructions Radioactive Iodine: What to Expect at [facility] Your Recovery Radioactive iodine is absorbed and concentrated by the thyroid gland. You get it in liquid or pill form. The radiation will pass out of your body through your urine within days. Until that time, you will give off radiation in your sweat, your saliva, your urine, and anything else that comes out of your body. It is important to avoid exposing other people to the radioactivity from your body. Your doctor will give you more written instructions. Follow these carefully. The instructions will tell you how far to stay away from people and how long you need to follow the precautions listed below. They will list other ways to keep other people safe. They will also tell you when it will be safe to go out, go to work, and do other activities. How can you care for yourself at home? General recommendations · For a period of time, you will need to keep your distance from other people, especially young children and pregnant women. · Avoid close contact, kissing, and sexual activity. You may need to sleep in a separate bed from your partner. · Keep the toilet very clean. Men should urinate sitting down to avoid splashing. Flush the toilet 2 or 3 times after each use.  Wash your hands well with soap and lots of water each time you use the toilet. · Rinse the bathroom sink and tub well after you use them. · Use separate towels, washcloths, and sheets. Wash these and your personal clothing by themselves. Don't wash them with other people's laundry. · You may want to use a special plastic trash bag for all your trash, such as bandages, paper or plastic dishes, menstrual pads, tissues, or paper towels. Talk to your treatment facility to see if they will handle the disposal. Or after 80 days, this bag can be thrown out with your other trash. · Wash your dishes in a  or by hand. If you use disposable dishes, they must be thrown away in the special plastic trash bag. · Don't cook for other people. If you must cook, use plastic gloves. Then throw them away in the special plastic trash bag. Don't share cups, dishes, or utensils. Pregnancy and children · Your doctor will tell you when it is safe to have sex and become pregnant. · You should not breastfeed your baby after you have been treated with radioactive iodine. Ask your doctor when it's safe to breastfeed. Travel · Don't take public transportation. If you are able, it's best to drive yourself. · It is important to prepare for any problems you may have at airport security. People who have had radioactive iodine treatment can set off the radiation detection machines in airports for a week to 10 days. Check with local authorities about any steps or permission you may need to travel. · If you plan to travel on the interstate, you may set off radiation detectors. Most police and transportation workers are aware of medical radiation, but it may help to carry some paperwork from your doctor. Follow-up care is a key part of your treatment and safety. Be sure to make and go to all appointments, and call your doctor if you are having problems. It's also a good idea to know your test results and keep a list of the medicines you take. When should you call for help? Watch closely for any changes in your health, and be sure to contact your doctor if: 
? · You have a sore throat. ? · You vomit. ? · You have diarrhea. Where can you learn more? Go to http://robi-antonio.info/. Enter N830 in the search box to learn more about \"Radioactive Iodine: What to Expect at Home. \" Current as of: May 12, 2017 Content Version: 11.4 © 3446-4440 [employer]. Care instructions adapted under license by [employer] (which disclaims liability or warranty for this information). If you have questions about a medical condition or this instruction, always ask your healthcare professional. [address] any warranty or liability for your use of this information. Introducing [facility] & HEALTH SERVICES! Henny Lucio introduces Fonality patient portal. Now you can access parts of your medical record, email your doctor's office, and request medication refills online. 1. In your internet browser, go to [email or web address] 2. Click on the First Time User? Click Here link in the Sign In box. You will see the New Member Sign Up page. 3. Enter your Fonality Access Code exactly as it appears below. You will not need to use this code after youve completed the sign-up process. If you do not sign up before the expiration date, you must request a new code. · Fonality Access Code: [email or web address] Expires: 1/8/2018 11:35 AM 
 
4. Enter the last four digits of your Social Security Number (xxxx) and Date of Birth (mm/dd/yyyy) as indicated and click Submit. You will be taken to the next sign-up page. 5. Create a Fonality ID. This will be your Fonality login ID and cannot be changed, so think of one that is secure and easy to remember. 6. Create a Fonality password. You can change your password at any time. 7. Enter your Password Reset Question and Answer.  This can be used at a later time if you forget your password. 8. Enter your e-mail address. You will receive e-mail notification when new information is available in [address] 9. Click Sign Up. You can now view and download portions of your medical record. 10. Click the Download Summary menu link to download a portable copy of your medical information. If you have questions, please visit the Frequently Asked Questions section of the [email or web address] website. Remember, [email or web address] is NOT to be used for urgent needs. For medical emergencies, dial 911. Now available from your iPhone and Android! Please provide this summary of care documentation to your next provider. Your primary care clinician is listed as Myranda Acuña. If you have any questions after today's visit, please call [phone].

## 2017-12-12 NOTE — PROGRESS NOTES
Dony Dejesus is a 46 y.o. female here for   Chief Complaint   Patient presents with    Thyroid Problem       1. Have you been to the ER, urgent care clinic since your last visit? Hospitalized since your last visit? -no    2. Have you seen or consulted any other health care providers outside of the [address] [place] [address] since your last visit?   Include any pap smears or colon screening.-no    Wt Readings from Last 3 Encounters:   10/10/17 113 lb 12.8 oz (51.6 kg)   05/02/17 128 lb 6.4 oz (58.2 kg)   03/17/17 130 lb 14.4 oz (59.4 kg)     Temp Readings from Last 3 Encounters:   10/10/17 97.6 °F (36.4 °C) (Oral)   05/02/17 97.8 °F (36.6 °C) (Oral)   03/17/17 98 °F (36.7 °C) (Oral)     BP Readings from Last 3 Encounters:   10/10/17 107/63   05/02/17 120/77   03/17/17 115/63     Pulse Readings from Last 3 Encounters:   10/10/17 97   05/02/17 74   03/17/17 84

## 2017-12-12 NOTE — PROGRESS NOTES
Balwinder Vargas MD              [address] [address]           Patient Information  Date:12/12/2017  Name : Kevin Esquivel 46 y.o.     YOB: 1965         Referred by: Connor Elder MD         Chief Complaint   Patient presents with    Thyroid Problem       History of present illness    Kevin Esquivel is a 46 y.o. female  here for  Fu of hyperthyroidism. she was initially referred for abnormal thyroid function tests and Multinodular goiter     She was on Lithium for 30 years which was stopped in Sept 2016 , also on [place]  She has autonomous right thyroid nodule, discussed radioactive iodine therapy which she was not interested, on methimazole  Thyroid uptake and scan - hyperfunctioning right thyroid nodule -     Thyroid function tests are fluctuating, missed methimazole    No FH of thyroid disease. No FH of thyroid cancer     Wt Readings from Last 3 Encounters:   12/12/17 121 lb 3.2 oz (55 kg)   10/10/17 113 lb 12.8 oz (51.6 kg)   05/02/17 128 lb 6.4 oz (58.2 kg)       Past Medical History:   Diagnosis Date    Autonomous thyroid nodule 3/18/2017    Multinodular goiter 3/18/2017    Subclinical hyperthyroidism 1/11/2017       Current Outpatient Prescriptions   Medication Sig    multivitamin (ONE A DAY) tablet Take 1 Tab by mouth daily.  OLANZapine (ZYPREXA) 2.5 mg tablet Take  by mouth nightly.  ARIPiprazole (ABILIFY) 5 mg tablet Take 7.5 mg by mouth daily.  Qthbn5-YfbP9-Q67-E-FA-Fish Oil 538--470 mg-mg-mcg-mcg cap Take  by mouth.  OTHER Take 60 mg by mouth. osphenia - 3 times a week    OTHER [facility] medicine     No current facility-administered medications for this visit.           Review of Systems:  - Gastrointestinal: no dysphagia  - Musculoskeletal: + joint pains or weakness  - Integumentary: no rashes  - Neurological: no numbness, tingling,   - Psychiatric: no depression or anxiety  - Endocrine: + cold intolerance,    Physical Examination:  Blood pressure 118/62, pulse 91, temperature 97.9 °F (36.6 °C), temperature source Oral, resp. rate 14, height 5' 4\" (1.626 m), weight 121 lb 3.2 oz (55 kg), SpO2 97 %. Body mass index is 20.8 kg/(m^2). - General: pleasant, no distress, good eye contact  - HEENT: no exopthalmos, no periorbital edema, no scleral/conjunctival injection, EOMI, no lid lag or stare  - Neck: supple, + thyromegaly, right thyroid nodule 2 - 3 cm ,non tender , no bruit   - Cardiovascular:regular,  normal S1 and S2,   - Respiratory: clear to auscultation bilaterally  - Gastrointestinal: soft, nontender, nondistended, BS +  - Neurological: alert and oriented   - Psychiatric: normal mood and affect  - Skin - normal turgor    Data Reviewed:     Hyperfunctioning nodule in the right mid to lower pole suppressing  the remainder of the gland. Thyroid uptake measures 18.3% which is within normal  limits. [] Reviewed labs    Assessment/Plan:     1. Subclinical hyperthyroidism    2. Autonomous thyroid nodule    3. Multinodular goiter       Multinodular goiter  Thyroid uptake and scan - hyperfunctioning right thyroid nodule -   Autonomous thyroid nodule -discussed therapeutic options,   Discussed QUIÑONES - agreed this time   Stop MMI   Risk of hypothyroidism, isolation discussed    Right thyroid nodule 2.6 cm in 2016   US 10/17 3.2 cm right thyroid nodule -   She has no compressive symptoms. Subclinical hyperthyroidism        Follow-up Disposition:  Return in about 2 months (around 2/12/2018). Thank you for allowing me to participate in the care of this patient.     Feliberto Marina MD      Patient verbalized understanding

## 2017-12-14 ENCOUNTER — HOSPITAL ENCOUNTER (OUTPATIENT)
Dept: NUCLEAR MEDICINE | Age: 52
Discharge: HOME | End: 2017-12-14
Attending: INTERNAL MEDICINE
Payer: OTHER GOVERNMENT

## 2017-12-14 DIAGNOSIS — E04.1 AUTONOMOUS THYROID NODULE: ICD-10-CM

## 2017-12-14 DIAGNOSIS — E05.90 SUBCLINICAL HYPERTHYROIDISM: ICD-10-CM

## 2017-12-14 DIAGNOSIS — E04.2 MULTINODULAR GOITER: ICD-10-CM

## 2017-12-14 PROCEDURE — A9517 I131 IODIDE CAP, RX: HCPCS

## 2018-02-23 ENCOUNTER — OFFICE VISIT (OUTPATIENT)
Dept: ENDOCRINOLOGY | Age: 53
End: 2018-02-23

## 2018-02-23 VITALS
TEMPERATURE: 97.3 F | OXYGEN SATURATION: 98 % | WEIGHT: 126.4 LBS | BODY MASS INDEX: 21.58 KG/M2 | HEIGHT: 64 IN | HEART RATE: 78 BPM | RESPIRATION RATE: 14 BRPM | SYSTOLIC BLOOD PRESSURE: 100 MMHG | DIASTOLIC BLOOD PRESSURE: 71 MMHG

## 2018-02-23 DIAGNOSIS — E04.2 MULTINODULAR GOITER: ICD-10-CM

## 2018-02-23 DIAGNOSIS — E04.1 AUTONOMOUS THYROID NODULE: ICD-10-CM

## 2018-02-23 DIAGNOSIS — E05.90 SUBCLINICAL HYPERTHYROIDISM: Primary | ICD-10-CM

## 2018-05-31 ENCOUNTER — OFFICE VISIT (OUTPATIENT)
Dept: NEUROLOGY | Age: 53
End: 2018-05-31

## 2018-05-31 VITALS
BODY MASS INDEX: 23.73 KG/M2 | WEIGHT: 139 LBS | SYSTOLIC BLOOD PRESSURE: 112 MMHG | RESPIRATION RATE: 16 BRPM | OXYGEN SATURATION: 98 % | HEIGHT: 64 IN | HEART RATE: 99 BPM | TEMPERATURE: 97.9 F | DIASTOLIC BLOOD PRESSURE: 70 MMHG

## 2018-05-31 DIAGNOSIS — R41.3 MEMORY CHANGES: Primary | ICD-10-CM

## 2018-05-31 RX ORDER — VENLAFAXINE 100 MG/1
TABLET ORAL
Refills: 0 | COMMUNITY
Start: 2018-05-01 | End: 2018-12-07

## 2018-05-31 RX ORDER — LANOLIN ALCOHOL/MO/W.PET/CERES
CREAM (GRAM) TOPICAL
COMMUNITY
End: 2022-10-11

## 2018-05-31 RX ORDER — GLUCOSAMINE/CHONDR SU A SOD 750-600 MG
TABLET ORAL
COMMUNITY
End: 2018-12-07

## 2018-05-31 RX ORDER — ARIPIPRAZOLE 5 MG/1
TABLET ORAL
Refills: 0 | COMMUNITY
Start: 2018-05-01

## 2018-05-31 NOTE — MR AVS SNAPSHOT
[address] [address] [facility] [address] 
[phone] Patient: April Padgett MRN: [id number] GQY:[birth year] Visit Information Date & Time Provider Department Dept. Phone Encounter #  
 5/31/2018 10:00 AM Rabia Hayden MD [address] [place] [address] Neurology Clinic [phone] [id number] Follow-up Instructions Return if symptoms worsen or fail to improve. Your Appointments 6/1/2018  9:45 AM  
LAB with CDE NURSE [facility] [address] [place] [address]) Appt Note: labs [address] [place] [address] [address] [facility] [address]  
[phone]  
  
   
 [address] [place] [address]  
  
    
 6/5/2018 11:00 AM  
PROCEDURE with EMG SCHEDULE 1991 [facility] ([address]) Appt Note: REEG  Memory Work Up    anusha    5/31/18 [place] [address] [facility] [address] [address] [phone]  
  
   
 [facility]  
  
    
 6/8/2018 11:45 AM  
ROUTINE CARE with Imelda Bang MD  
[facility] [address] [place] [address]) Appt Note: F/U Routine 4 months [address] [place] [address] [address] [facility] [address]  
[phone]  
  
   
 [address] [place] [address]  
  
    
 1/17/2019  1:00 PM  
New Patient with Omar Uribe PsyD 1991 [facility] ([address]) Appt Note: New Patient Memory Issues   anusha  5/31/18 [place] [address] [facility] [address] [address] [phone]  
  
   
 [place] [address] [address] [address] Upcoming Health Maintenance Date Due Hepatitis C Screening 1965 DTaP/Tdap/Td series (1 - Tdap) 2/5/1986 PAP AKA CERVICAL CYTOLOGY 2/5/1986 BREAST CANCER SCRN MAMMOGRAM 2/5/2015 FOBT Q 1 YEAR AGE 50-75 2/5/2015 Influenza Age 5 to Adult 8/1/2018 Allergies as of 5/31/2018  Review Complete On: 5/31/2018 By: Maria E Belle MD  
 No Known Allergies Current Immunizations  Never Reviewed No immunizations on file. Not reviewed this visit You Were Diagnosed With   
  
 Codes Comments Memory changes    -  Primary ICD-10-CM: R41.3 ICD-9-CM: 780.93 Vitals BP Pulse Temp Resp Height(growth percentile) Weight(growth percentile) 112/70 99 97.9 °F (36.6 °C) (Oral) 16 5' 4\" (1.626 m) 139 lb (63 kg) SpO2 BMI OB Status Smoking Status 98% 23.86 kg/m2 Hysterectomy Never Smoker Vitals History BMI and BSA Data Body Mass Index Body Surface Area  
 23.86 kg/m 2 1.69 m 2 Preferred Pharmacy Pharmacy Name Phone [facility] [address] [facility] [phone] Your Updated Medication List  
  
   
This list is accurate as of 5/31/18 10:42 AM.  Always use your most recent med list.  
  
  
  
  
 ARIPiprazole 5 mg tablet Commonly known as:  ABILIFY TK 2 TS PO QAM AFTER BREAKFAST Biotin 2,500 mcg Cap Take  by mouth. GINKOBA PO Take  by mouth. Iron 325 mg (65 mg iron) tablet Generic drug:  ferrous sulfate Take  by mouth Daily (before breakfast). multivitamin tablet Commonly known as:  ONE A DAY Take 1 Tab by mouth daily. Tzjlc0-QceQ2-B64-E-FA-Fish Oil 032--081 mg-mg-mcg-mcg Cap Take  by mouth. OTHER [facility] medicine TURMERIC ROOT EXTRACT PO Take  by mouth. venlafaxine 100 mg tablet Commonly known as:  [facility] TK 3 TS PO QD AT NOON  
  
 ZyPREXA 2.5 mg tablet Generic drug:  OLANZapine Take  by mouth nightly. We Performed the Following CBC WITH AUTOMATED DIFF [57094 CPT(R)] METABOLIC PANEL, COMPREHENSIVE [40746 CPT(R)] REFERRAL TO PSYCHOLOGY [EXV98 Custom] Comments: At least 6 months of memory concerns per history. Has background schizoaffective disorder. VITAMIN B12 & FOLATE [89135 CPT(R)] Follow-up Instructions Return if symptoms worsen or fail to improve. To-Do List   
 06/04/2018 Neurology:  EEG AMB NEURO   
  
 06/07/2018 Imaging:  MRI BRAIN WO CONT Referral Information Referral ID Referred By Referred To  
  
 [id number] [address]  [place] [address] [address], [place] [address] Phone: [phone] Fax: [phone] Visits Status Start Date End Date 1 New Request 5/31/18 5/31/19 If your referral has a status of pending review or denied, additional information will be sent to support the outcome of this decision. Patient Instructions Information Regarding Testing and Medication If you have physican order for a test or a medication denied by your insurance company, this does not mean the test or medication is not appropriate for you as that is a medical decision, not a decision to be made by an insurance company representative or by an [facility] physician who has not interviewed and examined you. This is a decision to be made between you and your physician. The denial of services is a contractual matter between you and your insurance company, not an issue between your physician and the insurance company. If your test or medication is denied, you can take the following steps to help resolve the issue: 1. File a complaint with the [facility] regarding your insurance company's denial of services ordered for you. You can do this either by calling them directly or by completing an on-line complaint form on the [employer]. This can be found at www.[place].gov 2.    Also file a formal complaint with your insurance company and ask to have the name of the person denying the service so that you may explore a legal option should you be harmed by this denial of service. Again, the fact the insurance company will not pay for the service does not mean it is not medically necessary and I would encourage you to follow through with the plan that was made with your physician 3. File a written complaint with your employer so your employer and benefit manager is aware of the poor coverage they are providing their employees. If you have medicare/medicaid, complain to your representative in the House and to your Divya Carrera. If we have ordered testing for you, we do not call patients with results and we do not give test results over the phone. We schedule follow up appointments so that your results can be discussed in person and any questions you have regarding them may be addressed. If something of concern is revealed on your test, we will call you for a sooner follow up appointment. Additionally, results may be found by using the My Chart feature and one of our patient service representatives at the  can give you instructions on how to access this feature of our electronic medical record system. Learning About the Symptoms of Dementia What is dementia? We all forget things as we get older. Many older people have a slight loss of memory that does not affect their daily lives. But memory loss that gets worse may mean that you have dementia. Dementia is a loss of mental skills that affects your daily life. It can cause problems with memory, problem-solving, and learning. It also can cause problems with thinking and planning. Dementia usually gets worse over time. But how quickly it gets worse is different for each person. Some people stay the same for years. Others lose skills quickly. Your chances of having dementia rise as you get older. But this doesn't mean that everyone will get it. What causes dementia? Dementia is caused by damage to or changes in the brain. Alzheimer's disease is the most common kind of dementia. Alzheimer's causes a steady loss of memory and how well you can speak, think, and do your daily activities. The second most common kind of dementia is caused by a series of strokes. It's called vascular dementia. It often gets worse step by step. With each new stroke, more mental skills are lost. 
Serious head injuries in the past can sometimes lead to dementia, too. What are the symptoms? Usually the first symptom of dementia is memory loss. Often the person who has the memory problem doesn't notice it, but family and friends do. People who have dementia may have increasing trouble with: 
· Recalling recent events. They may forget appointments or lose objects. · Recognizing people and places. · Keeping up with conversations and activity. · Finding their way around familiar places, or driving to and from places they know well. · Keeping up personal care such as grooming or bathing. · Planning and carrying out routine tasks. They may have trouble following a recipe or writing a letter or email. What are some next steps? If you are worried about memory loss, see your doctor soon. He or she can do a physical exam and ask questions about recent and past illnesses and life events. Think about bringing someone to your doctor's appointment to take notes for you. Your doctor may suggest a series of tests to measure memory changes over time. These tests give the doctor an overall picture of how well your brain is working. The doctor can use the results to decide the best treatment program and help make your life safer and easier. It is important to know that memory loss can be caused by things other than dementia. These things can include health problems such as depression, a low amount of thyroid hormone, and some infections. When those things are treated, your ability to remember can get better. Follow-up care is a key part of your treatment and safety. Be sure to make and go to all appointments, and call your doctor if you are having problems. It's also a good idea to know your test results and keep a list of the medicines you take. Where can you learn more? Go to http://robi-antonio.info/. Enter [id number] [id number] [phone] in the search box to learn more about \"Learning About the Symptoms of Dementia. \" Current as of: May 12, 2017 Content Version: 11.4 © 8059-0897 [employer]. Care instructions adapted under license by [employer] (which disclaims liability or warranty for this information). If you have questions about a medical condition or this instruction, always ask your healthcare professional. [address] any warranty or liability for your use of this information. Patient history reviewed and patient examined. Will proceed with memory workup that includes labs EEG brain MRI and neuropsych testing. Patient understands due to the delay in testing it could be a while before we catch up with each other. Is welcome to call the clinic to see if there is any openings in the neuropsych testing scheduled. Introducing [facility]! [facility] introduces [employer] patient portal. Now you can access parts of your medical record, email your doctor's office, and request medication refills online. 1. In your internet browser, go to [email or web address] 2. Click on the First Time User? Click Here link in the Sign In box. You will see the New Member Sign Up page. 3. Enter your [employer] Access Code exactly as it appears below. You will not need to use this code after youve completed the sign-up process. If you do not sign up before the expiration date, you must request a new code. · [employer] Access Code: [email or web address] Expires: 8/29/2018 10:42 AM 
 
4.  Enter the last four digits of your Social Security Number (xxxx) and Date of Birth (mm/dd/yyyy) as indicated and click Submit. You will be taken to the next sign-up page. 5. Create a JusticeBox ID. This will be your JusticeBox login ID and cannot be changed, so think of one that is secure and easy to remember. 6. Create a JusticeBox password. You can change your password at any time. 7. Enter your Password Reset Question and Answer. This can be used at a later time if you forget your password. 8. Enter your e-mail address. You will receive e-mail notification when new information is available in [address] 9. Click Sign Up. You can now view and download portions of your medical record. 10. Click the Download Summary menu link to download a portable copy of your medical information. If you have questions, please visit the Frequently Asked Questions section of the JusticeBox website. Remember, JusticeBox is NOT to be used for urgent needs. For medical emergencies, dial 911. Now available from your iPhone and Android! Please provide this summary of care documentation to your next provider. Your primary care clinician is listed as Iain Cheung. If you have any questions after today's visit, please call [phone].

## 2018-05-31 NOTE — PATIENT INSTRUCTIONS
Information Regarding Testing and Medication    If you have physican order for a test or a medication denied by your insurance company, this does not mean the test or medication is not appropriate for you as that is a medical decision, not a decision to be made by an insurance company representative or by an [facility] physician who has not interviewed and examined you. This is a decision to be made between you and your physician. The denial of services is a contractual matter between you and your insurance company, not an issue between your physician and the insurance company. If your test or medication is denied, you can take the following steps to help resolve the issue:    1. File a complaint with the [facility] regarding your insurance company's denial of services ordered for you. You can do this either by calling them directly or by completing an on-line complaint form on the [employer]. This can be found at www.[email or web address]."[employer]    2. Also file a formal complaint with your insurance company and ask to have the name of the person denying the service so that you may explore a legal option should you be harmed by this denial of service. Again, the fact the insurance company will not pay for the service does not mean it is not medically necessary and I would encourage you to follow through with the plan that was made with your physician    3. File a written complaint with your employer so your employer and benefit manager is aware of the poor coverage they are providing their employees. If you have medicare/medicaid, complain to your representative in the House and to your Divya Carrera. If we have ordered testing for you, we do not call patients with results and we do not give test results over the phone. We schedule follow up appointments so that your results can be discussed in person and any questions you have regarding them may be addressed. If something of concern is revealed on your test, we will call you for a sooner follow up appointment. Additionally, results may be found by using the My Chart feature and one of our patient service representatives at the  can give you instructions on how to access this feature of our electronic medical record system. Learning About the Symptoms of Dementia  What is dementia? We all forget things as we get older. Many older people have a slight loss of memory that does not affect their daily lives. But memory loss that gets worse may mean that you have dementia. Dementia is a loss of mental skills that affects your daily life. It can cause problems with memory, problem-solving, and learning. It also can cause problems with thinking and planning. Dementia usually gets worse over time. But how quickly it gets worse is different for each person. Some people stay the same for years. Others lose skills quickly. Your chances of having dementia rise as you get older. But this doesn't mean that everyone will get it. What causes dementia? Dementia is caused by damage to or changes in the brain. Alzheimer's disease is the most common kind of dementia. Alzheimer's causes a steady loss of memory and how well you can speak, think, and do your daily activities. The second most common kind of dementia is caused by a series of strokes. It's called vascular dementia. It often gets worse step by step. With each new stroke, more mental skills are lost.  Serious head injuries in the past can sometimes lead to dementia, too. What are the symptoms? Usually the first symptom of dementia is memory loss. Often the person who has the memory problem doesn't notice it, but family and friends do. People who have dementia may have increasing trouble with:  · Recalling recent events. They may forget appointments or lose objects. · Recognizing people and places.   · Keeping up with conversations and activity. · Finding their way around familiar places, or driving to and from places they know well. · Keeping up personal care such as grooming or bathing. · Planning and carrying out routine tasks. They may have trouble following a recipe or writing a letter or email. What are some next steps? If you are worried about memory loss, see your doctor soon. He or she can do a physical exam and ask questions about recent and past illnesses and life events. Think about bringing someone to your doctor's appointment to take notes for you. Your doctor may suggest a series of tests to measure memory changes over time. These tests give the doctor an overall picture of how well your brain is working. The doctor can use the results to decide the best treatment program and help make your life safer and easier. It is important to know that memory loss can be caused by things other than dementia. These things can include health problems such as depression, a low amount of thyroid hormone, and some infections. When those things are treated, your ability to remember can get better. Follow-up care is a key part of your treatment and safety. Be sure to make and go to all appointments, and call your doctor if you are having problems. It's also a good idea to know your test results and keep a list of the medicines you take. Where can you learn more? Go to http://robi-antonio.info/. Enter [phone] in the search box to learn more about \"Learning About the Symptoms of Dementia. \"  Current as of: May 12, 2017  Content Version: 11.4  © 1729-1822 [employer]wise[employer] Inc[employer]. Care instructions adapted under license by [employer] (which disclaims liability or warranty for this information). If you have questions about a medical condition or this instruction, always ask your healthcare professional. [place] [address] any warranty or liability for your use of this information.   Patient history reviewed and patient examined. Will proceed with memory workup that includes labs EEG brain MRI and neuropsych testing. Patient understands due to the delay in testing it could be a while before we catch up with each other. Is welcome to call the clinic to see if there is any openings in the neuropsych testing scheduled.

## 2018-05-31 NOTE — PROGRESS NOTES
Memory concerns. Neurology Consult      Subjective:      Sari Irby is a 48 y.o. female who comes in with her spouse on first visit. Starts off by saying there are memory concerns that go back at least 6 months or maybe before. 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 Has background schizoaffective disorder that is being treated and 6 months ago when she got a change of medicine she started realizing her memory was not efficient or responsible. Therefore the problem could have preceded that change of medicine.  agrees and his recounting of issues includes episodic memory dysfunction conversations misplacing items and affiliated impairments. The schizoaffective disorder is manifested as visual and auditory hallucinations and abrupt behavioral dysfunction that can come on randomly. Currently followed by psychiatry. Her dad in her [de-identified] had memory issues. Education includes a freshman year of college exposure but no degree etc.  In terms of activities of daily living she is independent. Currently fighting depression at this point. Eating and sleeping are good on both scores. Driving has been okay.  handles the finances. Hobbies include lots of housework and in her free time she likes the computer. Denies any background stroke traumatic brain injury seizures meningitis or encephalitis. Special sensory function intact. Does not smoke and alcohol is rare and has never been a problem. Currently followed by endocrinology for thyroid and has been labeled subclinical hyperthyroidism. Current Outpatient Prescriptions   Medication Sig Dispense Refill    venlafaxine (EFFEXOR) 100 mg tablet TK 3 TS PO QD AT NOON  0    ARIPiprazole (ABILIFY) 5 mg tablet TK 2 TS PO QAM AFTER BREAKFAST  0    Biotin 2,500 mcg cap Take  by mouth.  TURMERIC ROOT EXTRACT PO Take  by mouth.  ferrous sulfate (IRON) 325 mg (65 mg iron) tablet Take  by mouth Daily (before breakfast).       ginkgo biloba (GINKOBA PO) Take  by mouth.  multivitamin (ONE A DAY) tablet Take 1 Tab by mouth daily.  OLANZapine (ZYPREXA) 2.5 mg tablet Take  by mouth nightly.  Burtk4-[id number]-U83-E-FA-Fish Oil 563--045 mg-mg-mcg-mcg cap Take  by mouth.  OTHER Chinese medicine        No Known Allergies  Past Medical History:   Diagnosis Date    Autonomous thyroid nodule 3/18/2017    Depression     Memory change     Multinodular goiter 3/18/2017    Musculoskeletal disorder     Snoring     Subclinical hyperthyroidism 1/11/2017      Past Surgical History:   Procedure Laterality Date    HX HYSTERECTOMY      HX TUBAL LIGATION        Social History     Social History    Marital status:      Spouse name: N/A    Number of children: N/A    Years of education: N/A     Occupational History    Not on file. Social History Main Topics    Smoking status: Never Smoker    Smokeless tobacco: Never Used    Alcohol use Yes    Drug use: No    Sexual activity: Yes     Partners: Male     Other Topics Concern    Not on file     Social History Narrative      Family History   Problem Relation Age of Onset    Hypertension Father     Dementia Father     Stroke Father       Visit Vitals    /70    Pulse 99    Temp 97.9 °F (36.6 °C) (Oral)    Resp 16    Ht 5' 4\" (1.626 m)    Wt 63 kg (139 lb)    SpO2 98%    BMI 23.86 kg/m2        Review of Systems:   A comprehensive review of systems was negative except for that written in the HPI. Neuro Exam:     Appearance: The patient is well developed, well nourished, provides a partial coherent history that is enhanced by her spouse and is in no acute distress. Mental Status: Oriented to time, place and person. Mood and affect appropriate. 3/3 recall. Clock drawing acceptable. Cranial Nerves:   Intact visual fields. Fundi are benign. ILDEFONSO, EOM's full, no nystagmus, no ptosis. Facial sensation is normal. Corneal reflexes are intact.  Facial movement is symmetric. Hearing is normal bilaterally. Palate is midline with normal sternocleidomastoid and trapezius muscles are normal. Tongue is midline. Motor:  5/5 strength in upper and lower proximal and distal muscles. Normal bulk and tone. No fasciculations. Reflexes:   Deep tendon reflexes 2+/4 and symmetrical.   Sensory:   Normal to touch, pinprick and vibration. Gait:  Normal gait. Tremor:   No tremor noted. Cerebellar:  No cerebellar signs present. Neurovascular:  Normal heart sounds and regular rhythm, peripheral pulses intact, and no carotid bruits. Assessment:   Memory concerns. Will proceed with a workup that entails labs EEG brain MRI and neuropsych testing. Patient and  understand catching up with them will depend on how quickly the neuropsych testing can be done. They are welcome to call to see if there are openings but otherwise the wait could be significant. Plan:   Revisit pended.   Signed by :  Rivera Cast MD

## 2018-06-05 ENCOUNTER — OFFICE VISIT (OUTPATIENT)
Dept: NEUROLOGY | Age: 53
End: 2018-06-05

## 2018-06-05 DIAGNOSIS — R41.3 MEMORY CHANGES: ICD-10-CM

## 2018-06-05 NOTE — PROGRESS NOTES
This was a elective EEG evaluated in light of patient memory complaints. Routine scalp leads were applied according to the 10-20 International system. EKG monitor as well. Background rhythm was 8+ Hz in the awake mode. Reasonable modulation of background activity with eye opening and closure. No evidence of focal slowing or spontaneous flexor she will discharges. EKG grossly sinus rhythm. Hyperventilation evoked no changes. Photic stimulation produced a positive entrainment at different harmonics. Almost 3/4 of the way through the recording the patient transitions from wakefulness to drowsiness to brief light sleep. Patient alerts toward the end of the recording. No technician note as to any untoward patient behavior movement mannerisms or vocalizations. Impression: This is a normal awake drowsy like sleep recorded EEG. Clinical correlation is advised.   VIVI PEREZ.

## 2018-06-06 LAB
ALBUMIN SERPL-MCNC: 4.3 G/DL (ref 3.5–5.5)
ALBUMIN/GLOB SERPL: 1.5 {RATIO} (ref 1.2–2.2)
ALP SERPL-CCNC: 81 IU/L (ref 39–117)
ALT SERPL-CCNC: 24 IU/L (ref 0–32)
AST SERPL-CCNC: 18 IU/L (ref 0–40)
BASOPHILS # BLD AUTO: 0 X10E3/UL (ref 0–0.2)
BASOPHILS NFR BLD AUTO: 1 %
BILIRUB SERPL-MCNC: 0.3 MG/DL (ref 0–1.2)
BUN SERPL-MCNC: 14 MG/DL (ref 6–24)
BUN/CREAT SERPL: 20 (ref 9–23)
CALCIUM SERPL-MCNC: 9.4 MG/DL (ref 8.7–10.2)
CHLORIDE SERPL-SCNC: 105 MMOL/L (ref 96–106)
CO2 SERPL-SCNC: 27 MMOL/L (ref 18–29)
CREAT SERPL-MCNC: 0.7 MG/DL (ref 0.57–1)
EOSINOPHIL # BLD AUTO: 0 X10E3/UL (ref 0–0.4)
EOSINOPHIL NFR BLD AUTO: 0 %
ERYTHROCYTE [DISTWIDTH] IN BLOOD BY AUTOMATED COUNT: 13.6 % (ref 12.3–15.4)
FOLATE SERPL-MCNC: >20 NG/ML
GFR SERPLBLD CREATININE-BSD FMLA CKD-EPI: 114 ML/MIN/1.73
GFR SERPLBLD CREATININE-BSD FMLA CKD-EPI: 99 ML/MIN/1.73
GLOBULIN SER CALC-MCNC: 2.9 G/DL (ref 1.5–4.5)
GLUCOSE SERPL-MCNC: 70 MG/DL (ref 65–99)
HCT VFR BLD AUTO: 35.5 % (ref 34–46.6)
HGB BLD-MCNC: 12 G/DL (ref 11.1–15.9)
IMM GRANULOCYTES # BLD: 0 X10E3/UL (ref 0–0.1)
IMM GRANULOCYTES NFR BLD: 0 %
LYMPHOCYTES # BLD AUTO: 1.8 X10E3/UL (ref 0.7–3.1)
LYMPHOCYTES NFR BLD AUTO: 48 %
MCH RBC QN AUTO: 31.7 PG (ref 26.6–33)
MCHC RBC AUTO-ENTMCNC: 33.8 G/DL (ref 31.5–35.7)
MCV RBC AUTO: 94 FL (ref 79–97)
MONOCYTES # BLD AUTO: 0.2 X10E3/UL (ref 0.1–0.9)
MONOCYTES NFR BLD AUTO: 4 %
NEUTROPHILS # BLD AUTO: 1.8 X10E3/UL (ref 1.4–7)
NEUTROPHILS NFR BLD AUTO: 47 %
PLATELET # BLD AUTO: 196 X10E3/UL (ref 150–379)
POTASSIUM SERPL-SCNC: 4.2 MMOL/L (ref 3.5–5.2)
PROT SERPL-MCNC: 7.2 G/DL (ref 6–8.5)
RBC # BLD AUTO: 3.79 X10E6/UL (ref 3.77–5.28)
SODIUM SERPL-SCNC: 145 MMOL/L (ref 134–144)
VIT B12 SERPL-MCNC: 1613 PG/ML (ref 232–1245)
WBC # BLD AUTO: 3.8 X10E3/UL (ref 3.4–10.8)

## 2018-06-07 ENCOUNTER — HOSPITAL ENCOUNTER (OUTPATIENT)
Dept: MRI IMAGING | Age: 53
Discharge: HOME | End: 2018-06-07
Attending: SPECIALIST
Payer: OTHER GOVERNMENT

## 2018-06-07 DIAGNOSIS — R41.3 MEMORY CHANGES: ICD-10-CM

## 2018-06-07 PROCEDURE — 70551 MRI BRAIN STEM W/O DYE: CPT

## 2018-06-08 ENCOUNTER — OFFICE VISIT (OUTPATIENT)
Dept: ENDOCRINOLOGY | Age: 53
End: 2018-06-08

## 2018-06-08 ENCOUNTER — HOSPITAL ENCOUNTER (OUTPATIENT)
Dept: LAB | Age: 53
Discharge: HOME | End: 2018-06-08

## 2018-06-08 VITALS
DIASTOLIC BLOOD PRESSURE: 67 MMHG | BODY MASS INDEX: 23.75 KG/M2 | HEIGHT: 64 IN | WEIGHT: 139.1 LBS | OXYGEN SATURATION: 99 % | SYSTOLIC BLOOD PRESSURE: 112 MMHG | TEMPERATURE: 97.7 F | HEART RATE: 87 BPM | RESPIRATION RATE: 14 BRPM

## 2018-06-08 DIAGNOSIS — E04.2 MULTINODULAR GOITER: Primary | ICD-10-CM

## 2018-06-08 DIAGNOSIS — E04.1 AUTONOMOUS THYROID NODULE: ICD-10-CM

## 2018-06-08 DIAGNOSIS — E05.90 SUBCLINICAL HYPERTHYROIDISM: ICD-10-CM

## 2018-06-08 NOTE — PROGRESS NOTES
Jai Morrison is a 48 y.o. female here for   Chief Complaint   Patient presents with    Thyroid Problem       1. Have you been to the ER, urgent care clinic since your last visit? Hospitalized since your last visit? - no    2. Have you seen or consulted any other health care providers outside of the [address] [place] [address] since your last visit?   Include any pap smears or colon screening.- Neurologist

## 2018-06-08 NOTE — PROGRESS NOTES
Thyroid Ultrasound Report    Patient Information  Date:6/8/2018  Name : Zakiya Norris 48 y.o.     YOB: 1965           Indication: Thyroid nodule    Multiple real time longitudinal and transverse images were obtained using a high  resolution ultrasound with a Linear transducer. Right thyroid lobe measures 5.0 x 2.1 x 1.5 cm. There is a dominant nodule measuring 2.27 x 1.9 x 0.8 cm with microcalcifications, no increased vascularization. Left lobe measures 3.7 x 2.1 x 1.2 cm. There is another nodule measuring 1.2 x 0.76 x 0.65 cm with no microcalcifications. Isthmus measures 0.32 cm. Impression:     Multinodular goiter  Right thyroid nodule measures 2.27 cm with microcalcifications. FNA done today.   In the left lobe there is another nodule measuring 1.2 cm       Kaia Pierce MD

## 2018-06-08 NOTE — PATIENT INSTRUCTIONS
Call your doctor right away if you have these   Bleeding at the injection site for more than 10 minutes  Redness, warmth, swelling, or drainage at the injection site  Fever of 100.4°F (38.0°C), or higher      Call 911 if you have    Signs of severe allergic reaction (trouble breathing,tongue swelling )

## 2018-06-08 NOTE — MR AVS SNAPSHOT
[address] [address] [address] [facility] [address] 
[phone] Patient: Dana Berg MRN: [id number] QRC:[birth year] Visit Information Date & Time Provider Department Dept. Phone Encounter #  
 6/8/2018 11:45 AM Abeba Landon MD [facility] [phone] [id number] Follow-up Instructions Return in about 6 months (around 12/8/2018). Your Appointments 1/17/2019  1:00 PM  
New Patient with Jesus Marquez PsyD 1991 [facility] ([address]) Appt Note: New Patient Memory Issues   anusha  5/31/18 [place] [address] [place] [phone]  
  
   
 [place] [address] [id number] [address] [place] Upcoming Health Maintenance Date Due Hepatitis C Screening 1965 DTaP/Tdap/Td series (1 - Tdap) 2/5/1986 PAP AKA CERVICAL CYTOLOGY 2/5/1986 BREAST CANCER SCRN MAMMOGRAM 2/5/2015 FOBT Q 1 YEAR AGE 50-75 2/5/2015 Influenza Age 5 to Adult 8/1/2018 Allergies as of 6/8/2018  Review Complete On: 6/8/2018 By: Abeba Landon MD  
 No Known Allergies Current Immunizations  Never Reviewed No immunizations on file. Not reviewed this visit You Were Diagnosed With   
  
 Codes Comments Multinodular goiter    -  Primary ICD-10-CM: E04.2 ICD-9-CM: 241.1 Autonomous thyroid nodule     ICD-10-CM: E04.9 ICD-9-CM: 241.9 Subclinical hyperthyroidism     ICD-10-CM: E05.90 ICD-9-CM: 242.90 Vitals BP Pulse Temp Resp Height(growth percentile) Weight(growth percentile) 112/67 (BP 1 Location: Left arm, BP Patient Position: Sitting) 87 97.7 °F (36.5 °C) (Oral) 14 5' 4\" (1.626 m) 139 lb 1.6 oz (63.1 kg) SpO2 BMI OB Status Smoking Status 99% 23.88 kg/m2 Hysterectomy Never Smoker Vitals History BMI and BSA Data Body Mass Index Body Surface Area 23.88 kg/m 2 1.69 m 2 Preferred Pharmacy Pharmacy Name Phone [facility] [address] [phone] Your Updated Medication List  
  
   
This list is accurate as of 6/8/18 12:30 PM.  Always use your most recent med list.  
  
  
  
  
 ARIPiprazole 5 mg tablet Commonly known as:  ABILIFY TK 2 TS PO QAM AFTER BREAKFAST Biotin 2,500 mcg Cap Take  by mouth. GINKOBA PO Take  by mouth. Iron 325 mg (65 mg iron) tablet Generic drug:  ferrous sulfate Take  by mouth Daily (before breakfast). multivitamin tablet Commonly known as:  ONE A DAY Take 1 Tab by mouth daily. Pdgyu0-RuyR6-G99-E-FA-Fish Oil 710--476 mg-mg-mcg-mcg Cap Take  by mouth. OTHER [facility] medicine TURMERIC ROOT EXTRACT PO Take  by mouth. venlafaxine 100 mg tablet Commonly known as:  [facility] TK 3 TS PO QD AT NOON  
  
 ZyPREXA 2.5 mg tablet Generic drug:  OLANZapine Take  by mouth nightly. We Performed the Following FINE NEEDLE ASP;W/IMAGING GUIDANCE Q228486 CPT(R)] SONO GUIDE NEEDLE BIOPSY [28319 CPT(R)] US,HEAD/NECK TISSUES,REAL TIME [59017 CPT(R)] Follow-up Instructions Return in about 6 months (around 12/8/2018). Patient Instructions Call your doctor right away if you have these Bleeding at the injection site for more than 10 minutes Redness, warmth, swelling, or drainage at the injection site Fever of 100.4°F (38.0°C), or higher Call 911 if you have Signs of severe allergic reaction (trouble breathing,tongue swelling ) Introducing [facility]! Elina Pathak introduces [employer] patient portal. Now you can access parts of your medical record, email your doctor's office, and request medication refills online. 1. In your internet browser, go to [email or web address] 2. Click on the First Time User? Click Here link in the Sign In box. You will see the New Member Sign Up page. 3. Enter your [employer] Access Code exactly as it appears below. You will not need to use this code after youve completed the sign-up process. If you do not sign up before the expiration date, you must request a new code. · [employer] Access Code: [email or web address] Expires: 8/29/2018 10:42 AM 
 
4. Enter the last four digits of your Social Security Number (xxxx) and Date of Birth (mm/dd/yyyy) as indicated and click Submit. You will be taken to the next sign-up page. 5. Create a [employer] ID. This will be your [employer] login ID and cannot be changed, so think of one that is secure and easy to remember. 6. Create a [employer] password. You can change your password at any time. 7. Enter your Password Reset Question and Answer. This can be used at a later time if you forget your password. 8. Enter your e-mail address. You will receive e-mail notification when new information is available in [address] 9. Click Sign Up. You can now view and download portions of your medical record. 10. Click the Download Summary menu link to download a portable copy of your medical information. If you have questions, please visit the Frequently Asked Questions section of the [employer] website. Remember, [employer] is NOT to be used for urgent needs. For medical emergencies, dial 911. Now available from your iPhone and Android! Please provide this summary of care documentation to your next provider. Your primary care clinician [facility]. If you have any questions after today's visit, please call [phone].

## 2018-06-08 NOTE — PROGRESS NOTES
[facility] DIABETES & ENDOCRINOLOGY  OFFICE PROCEDURE PROGRESS NOTE        Chart reviewed for the following:   Don Pantoja MD, have reviewed the History, Physical and updated the Allergic reactions for Man Query     Procedure performed by:  Carley Parra MD    Assisted by:Karen Rodrigues LPN/Kya Mcguire LPN    TIME OUT performed immediately prior to start of procedure:   Don Pantoja MD, have performed the following reviews on Un H [place]erm[place]i prior to the start of the procedure:            * Patient was identified by name and date of birth   * Agreement on procedure being performed was verified  * Explained procedure and answered questions  * Procedure site verified and marked as necessary  * Patient was positioned for comfort  * Consent was signed and verified    Time *** AM  **** Right /Left     The risks, benefits and alternatives of ultrasound guided thyroid fine   needle aspiration were discussed with the patient. After all questions were   answered and informed written consent was obtained, patient was prepped  in a supine position. 1% lidocaine was used as local anesthetic. Using sonographic guidance, fine needle aspiration of thyroid  nodule was performed using 25-gauge needles. 4 aspirations were made using 25  G needles  Samples were submitted to cytology. Patient  tolerated procedure well without complications. Advised to keep ice for 30 minutes after going home. After care instructions provided.   Patient was told to expect a call in 2 weeks regarding the results       Pain at the site before procedure 0/10   Pain post procedure  ***/10

## 2018-06-08 NOTE — PROGRESS NOTES
[facility]               Socrates Perla MD              [address] [address]           Patient Information  Date:6/8/2018  Name : Librado Lai 48 y.o.     YOB: 1965         Referred by: Gaudencio Mckinley MD         Chief Complaint   Patient presents with    Thyroid Problem       History of present illness    Un Demetris Tinoco is a 48 y.o. female  here for  Fu of hyperthyroidism. she was initially referred for abnormal thyroid function tests and Multinodular goiter     She was on Lithium for 30 years which was stopped in Sept 2016 , also on [place]  She has autonomous right thyroid nodule, status post radioactive iodine therapy December 2017  Thyroid uptake and scan - hyperfunctioning right thyroid nodule -     She is seen neurology for memory loss. No FH of thyroid disease. No FH of thyroid cancer     Wt Readings from Last 3 Encounters:   06/08/18 139 lb 1.6 oz (63.1 kg)   05/31/18 139 lb (63 kg)   02/23/18 126 lb 6.4 oz (57.3 kg)       Past Medical History:   Diagnosis Date    Autonomous thyroid nodule 3/18/2017    Depression     Memory change     Multinodular goiter 3/18/2017    Musculoskeletal disorder     Snoring     Subclinical hyperthyroidism 1/11/2017       Current Outpatient Prescriptions   Medication Sig    venlafaxine (EFFEXOR) 100 mg tablet TK 3 TS PO QD AT NOON    ARIPiprazole (ABILIFY) 5 mg tablet TK 2 TS PO QAM AFTER BREAKFAST    Biotin 2,500 mcg cap Take  by mouth.  TURMERIC ROOT EXTRACT PO Take  by mouth.  ferrous sulfate (IRON) 325 mg (65 mg iron) tablet Take  by mouth Daily (before breakfast).  ginkgo biloba (GINKOBA PO) Take  by mouth.  multivitamin (ONE A DAY) tablet Take 1 Tab by mouth daily.  OLANZapine (ZYPREXA) 2.5 mg tablet Take  by mouth nightly.  Rgedn8-PtnK1-U39-E-FA-Fish Oil 080--479 mg-mg-mcg-mcg cap Take  by mouth.     OTHER [facility] medicine     No current facility-administered medications for this visit. Review of Systems:  - Gastrointestinal: no dysphagia  - Musculoskeletal: + joint pains or weakness  - Integumentary: no rashes  - Neurological: no numbness, tingling,   - Psychiatric: no depression or anxiety  - Endocrine: + cold intolerance,    Physical Examination:  Blood pressure 112/67, pulse 87, temperature 97.7 °F (36.5 °C), temperature source Oral, resp. rate 14, height 5' 4\" (1.626 m), weight 139 lb 1.6 oz (63.1 kg), SpO2 99 %. Body mass index is 23.88 kg/(m^2). - General: pleasant, no distress, good eye contact  - HEENT: no exopthalmos, no periorbital edema, no scleral/conjunctival injection, EOMI, no lid lag or stare  - Neck: supple, + thyromegaly, right thyroid nodule 2 - 3 cm ,non tender , no bruit   - Cardiovascular:regular,  normal S1 and S2,   - Respiratory: clear to auscultation bilaterally  - Gastrointestinal: soft, nontender, nondistended, BS +  - Neurological: alert and oriented   - Psychiatric: normal mood and affect  - Skin - normal turgor    Data Reviewed:     Hyperfunctioning nodule in the right mid to lower pole suppressing  the remainder of the gland. Thyroid uptake measures 18.3% which is within normal  limits. [] Reviewed labs    Assessment/Plan:     1. Autonomous thyroid nodule    2. Multinodular goiter    3.  Subclinical hyperthyroidism       Multinodular goiter, toxic  Thyroid uptake and scan - hyperfunctioning right thyroid nodule -   Autonomous thyroid nodule -  QUIÑONES 12/17   Euthyroid now     Right thyroid nodule 2.6 cm in 2016   US 10/17 3.2 cm right thyroid nodule -  Ultrasound thyroid June 2018 -2.2 cm right thyroid nodule, she is euthyroid, status post radioactive iodine therapy, since it is persisting we will proceed with fine-needle aspiration biopsy      Alopecia: Minoxidil, ferrous sulfate      Follow-up Disposition: Not on File    Thank you for allowing me to participate in the care of this patient.     Seun Allen MD      Patient verbalized understanding

## 2018-06-09 NOTE — PROGRESS NOTES
[facility] DIABETES & ENDOCRINOLOGY  OFFICE PROCEDURE PROGRESS NOTE        Chart reviewed for the following:   Gladis Delcid MD, have reviewed the History, Physical and updated the Allergic reactions for Un H Campisi     Procedure performed by:  Valerie Castaneda MD    Assisted by:Karen Cruz Do, LPN/Kya Mcguire LPN    TIME OUT performed immediately prior to start of procedure:   Gladis Delcid MD, have performed the following reviews on Un H Campisi prior to the start of the procedure:            * Patient was identified by name and date of birth   * Agreement on procedure being performed was verified  * Explained procedure and answered questions  * Procedure site verified and marked as necessary  * Patient was positioned for comfort  * Consent was signed and verified    Right lobe nodule   2 PM     The risks, benefits and alternatives of ultrasound guided thyroid fine   needle aspiration were discussed with the patient. After all questions were   answered and informed written consent was obtained, patient was prepped  in a supine position. 1% lidocaine was used as local anesthetic. Using sonographic guidance, fine needle aspiration of thyroid  nodule was performed using 25-gauge needles. 5 aspirations were made using 25  G needles  Samples were submitted to cytology. Patient  tolerated procedure well without complications. Advised to keep ice for 30 minutes after going home. After care instructions provided.   Patient was told to expect a call in 2 weeks regarding the results       Pain at the site before procedure 0/10   Pain post procedure 0/10

## 2018-10-05 ENCOUNTER — TELEPHONE (OUTPATIENT)
Dept: NEUROLOGY | Age: 53
End: 2018-10-05

## 2018-10-05 NOTE — TELEPHONE ENCOUNTER
----- Message from [facility] sent at 10/5/2018 10:11 AM EDT -----  Regarding: Dr. Karina Dixon  Contact: [phone]  Pt is requesting a acute appt with the doctor because she feels like she has pins and needles feeling all over her body? Pt declined first available.

## 2018-10-10 ENCOUNTER — OFFICE VISIT (OUTPATIENT)
Dept: NEUROLOGY | Age: 53
End: 2018-10-10

## 2018-10-10 VITALS
OXYGEN SATURATION: 99 % | BODY MASS INDEX: 24.41 KG/M2 | WEIGHT: 143 LBS | RESPIRATION RATE: 12 BRPM | DIASTOLIC BLOOD PRESSURE: 80 MMHG | HEIGHT: 64 IN | SYSTOLIC BLOOD PRESSURE: 110 MMHG | HEART RATE: 107 BPM

## 2018-10-10 DIAGNOSIS — R41.3 MEMORY CHANGES: Primary | ICD-10-CM

## 2018-10-10 DIAGNOSIS — R20.2 PARESTHESIA: ICD-10-CM

## 2018-10-10 NOTE — PROGRESS NOTES
Date:  10/22/18     Name:  Edith Brink  :  1965  MRN:  [id number]     PCP:  Alejandra Karimi MD    No chief complaint on file. HISTORY OF PRESENT ILLNESS:The patient was last seen by Carolee Ibanez for memory, however, she presents today with new complaints. She reports pin and needles sensation all over her body. She notes it has been going on for 2 years, but over the last several months sharp pain has gotten worse. The numbness and tingling is constant, but the sharp pain is sporadic location included ( tongue, face, legs, ankle)    She notes at this current moment she feels the pins and needle sensation whole body. Unable to recall if anything makes it worse, but over the year it has been getting worse. No gait changes. No new medication. No back pain.  questioned if it was MS. MRI of the brain was discussed There is no evidence of significant cerebral atrophy or hydrocephalus. Findings suggestive of minimal chronic microvascular ischemic change. No intracranial mass, hemorrhage or evidence of acute infarction. No acute intracranial process. Lab work was completed and unremarkable. History of abnormal thyroid function tests and Multinodular goiter . Thyroid function now is normal   Current Outpatient Medications   Medication Sig    venlafaxine (EFFEXOR) 100 mg tablet TK 3 TS PO QD AT NOON    ARIPiprazole (ABILIFY) 5 mg tablet TK 2 TS PO QAM AFTER BREAKFAST    Biotin 2,500 mcg cap Take  by mouth.  TURMERIC ROOT EXTRACT PO Take  by mouth.  ferrous sulfate (IRON) 325 mg (65 mg iron) tablet Take  by mouth Daily (before breakfast).  ginkgo biloba (GINKOBA PO) Take  by mouth.  multivitamin (ONE A DAY) tablet Take 1 Tab by mouth daily.  OLANZapine (ZYPREXA) 2.5 mg tablet Take  by mouth nightly.  Cagkm0-GttV6-G93-E-FA-Fish Oil 742--003 mg-mg-mcg-mcg cap Take  by mouth.  OTHER [facility] medicine     No current facility-administered medications for this visit. No Known Allergies  Past Medical History:   Diagnosis Date    Autonomous thyroid nodule 3/18/2017    Depression     Memory change     Multinodular goiter 3/18/2017    Musculoskeletal disorder     Snoring     Subclinical hyperthyroidism 1/11/2017     Past Surgical History:   Procedure Laterality Date    HX HYSTERECTOMY      HX TUBAL LIGATION       Social History     Socioeconomic History    Marital status:      Spouse name: Not on file    Number of children: Not on file    Years of education: Not on file    Highest education level: Not on file   Social Needs    Financial resource strain: Not on file    Food insecurity - worry: Not on file    Food insecurity - inability: Not on file   [employer] needs - medical: Not on file   [employer] needs - non-medical: Not on file   Occupational History    Not on file   Tobacco Use    Smoking status: Never Smoker    Smokeless tobacco: Never Used   Substance and Sexual Activity    Alcohol use: Yes    Drug use: No    Sexual activity: Yes     Partners: Male   Other Topics Concern    Not on file   Social History Narrative    Not on file     Family History   Problem Relation Age of Onset    Hypertension Father     Dementia Father     Stroke Father        PHYSICAL EXAMINATION:    Visit Vitals  /80 (BP 1 Location: Left arm, BP Patient Position: Sitting)   Pulse (!) 107   Resp 12   Ht 5' 4\" (1.626 m)   Wt 64.9 kg (143 lb)   SpO2 99%   BMI 24.55 kg/m²     General:  Well defined, nourished, and groomed individual in no acute distress. Neck: Supple, nontender, no bruits, no pain with resistance to active range of motion. Heart: Regular rate and rhythm, no murmurs, rub, or gallop. Normal S1S2. Lungs:  Clear to auscultation bilaterally with equal chest expansion, no cough, no wheeze  Musculoskeletal:  Extremities revealed no edema and had full range of motion of joints.     Psych:  Good mood and bright affect    NEUROLOGICAL EXAMINATION:     Mental Status:   Alert and oriented to person, place, and time with recent and remote memory intact. Attention span and concentration are normal. Speech is fluent with a full fund of knowledge. Cranial Nerves:    II, III, IV, VI:  Visual acuity grossly intact. Visual fields are normal.    Pupils are equal, round, and reactive to light and accommodation. Extra-ocular movements are full and fluid. Fundoscopic exam was benign, no ptosis or nystagmus. V-XII: Hearing is grossly intact. Facial features are symmetric, with normal sensation and strength. The palate rises symmetrically and the tongue protrudes midline. Sternocleidomastoids 5/5. Motor Examination: Normal tone, bulk, and strength, 5/5 muscle strength throughout. No cogwheel rigidity or clonus present. Sensory exam:  Normal throughout to pinprick, temperature, and vibration sense. Normal proprioception. Coordination:  Heel-to-shin was smooth and symmetrical bilaterally. Finger to nose and rapid arm movement testing was normal.   No resting or intention tremor    Gait and Station:  Steady while walking on toes, heels, and with tandem walking. Normal arm swing. No Rhomberg or pronator drift. No muscle wasting or fasiculations noted. Reflexes:  DTRs 2+ throughout. Toes downgoing. ASSESSMENT AND PLAN    ICD-10-CM ICD-9-CM    1. Memory changes R41.3 780.93    2. Paresthesia R20.2 782.0 EMG LIMITED    Paresthesia can be caused by an underlying neurologic issue. MRI of the brain-unremarkable   Metabolic causes  Diabetes Mellitus,Hypothyroidism,Vitamin B12 Deficiency, have been rule out. We can proceed with an EMG for nerve damage. If abnormal may proceeded with imaging of the spine  We did discuss Strauss Criteria . Inform the patient and   at this moment there is no definitive signs of MS. If EMG is normal we will refer to rheumatology for Connective Tissue Diseases.    Paresthesia can also occur with panic attacks. 30 minutes of this 40 minute office visit was spent in education and counseling regarding MS. Cause of paresthesia, Past hx . This note will not be viewable in [address]   Miguel Padgett NP

## 2018-10-10 NOTE — PROGRESS NOTES
Follow up. Pins and needles all over her body, started 2 yrs ago. Gradually has gotten worse. Will have a sharp pain at different locations on body. \"Sharp needling pain\". Tired all of the time. Short term memory loss, inability to focus, recall events. Dx with clinical depression, do not know if that is exacerbating this. Occasional cramps and spasms in legs.

## 2018-10-10 NOTE — MR AVS SNAPSHOT
[address] [address] [address] [facility] 
[phone] Patient: Charito Casper MRN: [id number] VKU:[id number]/[id number] Visit Information Date & Time Provider Department Dept. Phone Encounter #  
 10/10/2018  1:30 PM Rajiv Osman NP [facility] [phone] [id number] Follow-up Instructions Return for after testing. Your Appointments 11/30/2018 10:30 AM  
LAB with CDE NURSE [facility]) Appt Note: lab only [address] [facility] [address] [facility] [address]  
[phone]  
  
   
 [address] [id number]  
  
    
 12/7/2018 10:30 AM  
ROUTINE CARE with Marnie Gaston MD  
[facility]) Appt Note: f/u 6 month  
 [address] [facility] [address]  
[phone]  
  
   
 [phone]  
  
    
 1/17/2019  1:00 PM  
New Patient with Italia Eubanks PsyD [facility] ([facility]) Appt Note: New Patient Memory Issues   anusha  5/31/18 [place] [address] [facility] [address] [address] [phone]  
  
   
 [place] [address] [address] [address] Upcoming Health Maintenance Date Due Hepatitis C Screening 1965 DTaP/Tdap/Td series (1 - Tdap) 2/5/1986 PAP AKA CERVICAL CYTOLOGY 2/5/1986 Shingrix Vaccine Age 50> (1 of 2) 2/5/2015 BREAST CANCER SCRN MAMMOGRAM 2/5/2015 FOBT Q 1 YEAR AGE 50-75 2/5/2015 Influenza Age 5 to Adult 8/1/2018 Allergies as of 10/10/2018  Review Complete On: 6/8/2018 By: Marnie Gaston MD  
 No Known Allergies Current Immunizations  Never Reviewed No immunizations on file. Not reviewed this visit You Were Diagnosed With   
  
 Codes Comments Memory changes    -  Primary ICD-10-CM: R41.3 ICD-9-CM: 780.93 Paresthesia     ICD-10-CM: R20.2 ICD-9-CM: 554. 0 Vitals BP Pulse Resp Height(growth percentile) Weight(growth percentile) SpO2  
 110/80 (BP 1 Location: Left arm, BP Patient Position: Sitting) (!) 107 12 5' 4\" (1.626 m) 143 lb (64.9 kg) 99% BMI OB Status Smoking Status 24.55 kg/m2 Hysterectomy Never Smoker BMI and BSA Data Body Mass Index Body Surface Area 24.55 kg/m 2 1.71 m 2 Preferred Pharmacy Pharmacy Name Phone [facility] [address] [phone] Your Updated Medication List  
  
   
This list is accurate as of 10/10/18  1:44 PM.  Always use your most recent med list.  
  
  
  
  
 ARIPiprazole 5 mg tablet Commonly known as:  ABILIFY TK 2 TS PO QAM AFTER BREAKFAST Biotin 2,500 mcg Cap Take  by mouth. GINKOBA PO Take  by mouth. Iron 325 mg (65 mg iron) tablet Generic drug:  ferrous sulfate Take  by mouth Daily (before breakfast). multivitamin tablet Commonly known as:  ONE A DAY Take 1 Tab by mouth daily. Ldmgx6-GtaP1-D54-E-FA-Fish Oil 780--023 mg-mg-mcg-mcg Cap Take  by mouth. OTHER [facility] medicine TURMERIC ROOT EXTRACT PO Take  by mouth. venlafaxine 100 mg tablet Commonly known as:  [facility] TK 3 TS PO QD AT NOON  
  
 ZyPREXA 2.5 mg tablet Generic drug:  OLANZapine Take  by mouth nightly. Follow-up Instructions Return for after testing. To-Do List   
 10/10/2018 Neurology:  EMG LIMITED Introducing [facility]! [facility] introduces [employer] patient portal. Now you can access parts of your medical record, email your doctor's office, and request medication refills online. 1. In your internet browser, go to [email or web address] 2. Click on the First Time User? Click Here link in the Sign In box.  You will see the New Member Sign Up page. 3. Enter your [employer] Access Code exactly as it appears below. You will not need to use this code after youve completed the sign-up process. If you do not sign up before the expiration date, you must request a new code. · [employer] Access Code: [email or web address] Expires: 1/8/2019  1:44 PM 
 
4. Enter the last four digits of your Social Security Number (xxxx) and Date of Birth (mm/dd/yyyy) as indicated and click Submit. You will be taken to the next sign-up page. 5. Create a [employer] ID. This will be your [employer] login ID and cannot be changed, so think of one that is secure and easy to remember. 6. Create a [employer] password. You can change your password at any time. 7. Enter your Password Reset Question and Answer. This can be used at a later time if you forget your password. 8. Enter your e-mail address. You will receive e-mail notification when new information is available in [address]. 9. Click Sign Up. You can now view and download portions of your medical record. 10. Click the Download Summary menu link to download a portable copy of your medical information. If you have questions, please visit the Frequently Asked Questions section of the [employer] website. Remember, [employer] is NOT to be used for urgent needs. For medical emergencies, dial 911. Now available from your iPhone and Android! Please provide this summary of care documentation to your next provider. Your primary care clinician is listed as Jennifer Solis. If you have any questions after today's visit, please call [phone].

## 2018-10-30 ENCOUNTER — OFFICE VISIT (OUTPATIENT)
Dept: NEUROLOGY | Age: 53
End: 2018-10-30

## 2018-10-30 DIAGNOSIS — G58.9 ENTRAPMENT NEUROPATHY: ICD-10-CM

## 2018-10-30 DIAGNOSIS — G62.9 NEUROPATHY: Primary | ICD-10-CM

## 2018-10-30 NOTE — PROGRESS NOTES
EMG/ NCS Report  [facility]  [facility], [place]  [facility], [address]   Ph: [phone]   FAX: [phone]/ [phone]  Test Date:  10/30/2018    Patient: Rekha Mendez : 1965 Physician: Casey Garcia MD   Sex: Female Height: ' \" Ref Phys: Ankita Linker   ID#: [id number] Weight:  lbs. Technician: Sol Victor     Patient History / Exam:  CC:PAIN IN THE WHOLE BODY          EMG & NCV Findings:  Evaluation of the left Fibular motor nerve showed normal distal onset latency (3.8 ms), normal amplitude (3.0 mV), normal conduction velocity (B Fib-Ankle, 45 m/s), and normal conduction velocity (Poplt-B Fib, 77 m/s). The left median motor and the right median motor nerves showed normal distal onset latency (L3.3, R3.2 ms), normal amplitude (L11.2, R15.8 mV), and normal conduction velocity (Elbow-Wrist, L58, R56 m/s). The left tibial motor nerve showed normal distal onset latency (4.1 ms), normal amplitude (12.4 mV), and normal conduction velocity (Knee-Ankle, 43 m/s). The left ulnar motor and the right ulnar motor nerves showed normal distal onset latency (L2.4, R2.6 ms), normal amplitude (L8.7, R12.1 mV), normal conduction velocity (B Elbow-Wrist, L59, R60 m/s), and normal conduction velocity (A Elbow-B Elbow, L59, R59 m/s). The left median sensory, the right median sensory, the left radial sensory, the right radial sensory, the left sural sensory, the left ulnar sensory, and the right ulnar sensory nerves showed normal distal peak latency (L3.1, R3.1, L2.3, R1.9, L3.4, L3.2, R2.9 ms) and normal amplitude (L74.0, R49.4, L25.2, R94.5, L14.4, L43.1, R39.4 µV). The left Sup Fibular sensory nerve showed normal distal peak latency (2.1 ms), normal amplitude (17.6 µV), and normal conduction velocity (Lower leg-Lat ankle, 63 m/s).   The left median/ulnar (palm) comparison nerve showed normal distal onset latency (Median Palm, 1.2 ms), normal distal peak latency (Median Palm, 1.7 ms), normal amplitude (Median Palm, 81.9 µV), normal distal onset latency (Ulnar Palm, 1.3 ms), normal distal peak latency (Ulnar Palm, 1.7 ms), and normal amplitude (Ulnar Palm, 32.4 µV). All left vs. right side differences were within normal limits. All F Wave latencies were within normal limits. All F Wave left vs. right side latency differences were within normal limits. All examined muscles (as indicated in the following table) showed no evidence of electrical instability.         Impression:        ___________________________  Gunner Aburto IV, MD      Nerve Conduction Studies  Anti Sensory Summary Table     Stim Site NR Peak (ms) Norm Peak (ms) P-T Amp (µV) Norm P-T Amp Site1 Site2 Dist (cm)   Left Median Anti Sensory (2nd Digit)  32.5°C   Wrist    3.1 <4 74.0 >13 Wrist 2nd Digit 14.0   Right Median Anti Sensory (2nd Digit)  31.7°C   Wrist    3.1 <4 49.4 >13 Wrist 2nd Digit 14.0   Left Radial Anti Sensory (Base 1st Digit)  30.8°C   Wrist    2.3 <2.8 25.2 >11 Wrist Base 1st Digit 10.0   Right Radial Anti Sensory (Base 1st Digit)  30.6°C   Wrist    1.9 <2.8 94.5 >11 Wrist Base 1st Digit 10.0   Left Sup Fibular Anti Sensory (Lat ankle)  28.5°C   Lower leg    2.1 <4.5 17.6 >5 Lower leg Lat ankle 10.0       2.3  15.8       Left Sural Anti Sensory (Lat Mall)  29.5°C   Calf    3.4 <4.5 14.4 >4.0 Calf Lat Mall 14.0   Site 2    3.2  15.1       Left Ulnar Anti Sensory (5th Digit)  31.5°C   Wrist    3.2 <4.0 43.1 >9 Wrist 5th Digit 14.0   Right Ulnar Anti Sensory (5th Digit)  31.2°C   Wrist    2.9 <4.0 39.4 >9 Wrist 5th Digit 14.0     Motor Summary Table     Stim Site NR Onset (ms) Norm Onset (ms) O-P Amp (mV) Norm O-P Amp Amp (Prev) (%) Site1 Site2 Dist (cm) John (m/s) Norm John (m/s)   Left Fibular Motor (Ext Dig Brev)  28°C   Ankle    3.8 <6.5 3.0 >2.6 100.0 Ankle Ext Dig Brev 8.0     B Fib    10.7  2.7  90.0 B Fib Ankle 31.0 45 >38   Poplt    12.0  2.7  100.0 Poplt B Fib 10.0 77 >42   Left Median Motor (Abd Poll Brev)  30.3°C   Wrist    3.3 <4.5 11.2 >4.1 100.0 Wrist Abd Poll Brev 8.0     Elbow    6.6  11.3  100.9 Elbow Wrist 19.0 58 >49   Right Median Motor (Abd Poll Brev)  30.2°C   Wrist    3.2 <4.5 15.8 >4.1 100.0 Wrist Abd Poll Brev 8.0     Elbow    6.6  15.7  99.4 Elbow Wrist 19.0 56 >49   Left Tibial Motor (Abd Alfaro Brev)  30.8°C   Ankle    4.1 <6.1 12.4 >5.3 100.0 Ankle Abd Alfaro Brev 8.0     Knee    11.8  10.3  83.1 Knee Ankle 33.0 43 >39   Left Ulnar Motor (Abd Dig Minimi)  29.9°C   Wrist    2.4 <3.1 8.7 >7.0 100.0 Wrist Abd Dig Minimi 8.0  >50   B Elbow    5.6  7.7  88.5 B Elbow Wrist 19.0 59 >50   A Elbow    7.3  7.5  97.4 A Elbow B Elbow 10.0 59 >50   Right Ulnar Motor (Abd Dig Minimi)  29.5°C   Wrist    2.6 <3.1 12.1 >7.0 100.0 Wrist Abd Dig Minimi 8.0  >50   B Elbow    5.6  10.8  89.3 B Elbow Wrist 18.0 60 >50   A Elbow    7.3  10.0  92.6 A Elbow B Elbow 10.0 59 >50     Comparison Summary Table     Stim Site NR Peak (ms) P-T Amp (µV) Site1 Site2 Dist (cm) Delta-0 (ms)   Left Median/Ulnar Palm Comparison (Wrist)  32.5°C   Median Palm    1.7 86.2 Median Palm Ulnar Palm 8.0 0.1   Ulnar Palm    1.7 51.1         F Wave Studies     NR F-Lat (ms) Lat Norm (ms) L-R F-Lat (ms) L-R Lat Norm   Left Tibial (Mrkrs) (Abd Hallucis)  29.8°C      46.79 <56  <5.7   Left Ulnar (Mrkrs) (Abd Dig Min)  29.7°C      25.02 <32 0.00 <2.5   Right Ulnar (Mrkrs) (Abd Dig Min)  29.3°C      25.02 <32 0.00 <2.5     H Reflex Studies     NR H-Lat (ms) L-R H-Lat (ms) L-R Lat Norm   Left Tibial (Gastroc)  29.4°C      31.47  <2.0     EMG     Side Muscle Nerve Root Ins Act Fibs Psw Recrt Duration Amp Poly Comment   Left Ext Dig Brev Dp Br Peron L5, S1 Nml Nml Nml Nml Nml Nml Nml    Left AntTibialis Dp Br Peron L4-5 Nml Nml Nml Nml Nml Nml Nml    Left MedGastroc Tibial S1-2 Nml Nml Nml Nml Nml Nml Nml    Left VastusMed Femoral L2-4 Nml Nml Nml Nml Nml Nml Nml    Left BicepsFemL Sciatic L5-S2 Nml Nml Nml Nml Nml Nml Nml    Left Abd Poll Brev Median C8-T1 Nml Nml Nml Nml Nml Nml Nml    Left BrachioRad Radial C5-6 Nml Nml Nml Nml Nml Nml Nml    Left Biceps Musculocut C5-6 Nml Nml Nml Nml Nml Nml Nml    Left Triceps Radial C6-7-8 Nml Nml Nml Nml Nml Nml Nml    Left Deltoid Axillary C5-6 Nml Nml Nml Nml Nml Nml Nml                Nerve Conduction Studies  Anti Sensory Left/Right Comparison     Stim Site L Lat (ms) R Lat (ms) L-R Lat (ms) L Amp (µV) R Amp (µV) L-R Amp (%) Site1 Site2 L John (m/s) R John (m/s) L-R John (m/s)   Median Anti Sensory (2nd Digit)  32.5°C   Wrist 2.4 2.5 0.1 74.0 49.4 33.2 Wrist 2nd Digit 58 56 2   Radial Anti Sensory (Base 1st Digit)  30.8°C   Wrist 1.8 1.4 0.4 25.2 94.5 73.3 Wrist Base 1st Digit 56 71 15   Sup Fibular Anti Sensory (Lat ankle)  28.5°C   Lower leg 1.6   17.6   Lower leg Lat ankle 63      1.7   15.8          Sural Anti Sensory (Lat Mall)  29.5°C   Calf 3.0   14.4   Calf Lat Mall 47     Site 2 2.8   15.1          Ulnar Anti Sensory (5th Digit)  31.5°C   Wrist 2.5 2.3 0.2 43.1 39.4 8.6 Wrist 5th Digit 56 61 5     Motor Left/Right Comparison     Stim Site L Lat (ms) R Lat (ms) L-R Lat (ms) L Amp (mV) R Amp (mV) L-R Amp (%) Site1 Site2 L John (m/s) R John (m/s) L-R John (m/s)   Fibular Motor (Ext Dig Brev)  28°C   Ankle 3.8   3.0   Ankle Ext Dig Brev      B Fib 10.7   2.7   B Fib Ankle 45     Poplt 12.0   2.7   Poplt B Fib 77     Median Motor (Abd Poll Brev)  30.3°C   Wrist 3.3 3.2 0.1 11.2 15.8 29.1 Wrist Abd Poll Brev      Elbow 6.6 6.6 0.0 11.3 15.7 28.0 Elbow Wrist 58 56 2   Tibial Motor (Abd Alfaro Brev)  30.8°C   Ankle 4.1   12.4   Ankle Abd Alfaro Brev      Knee 11.8   10.3   Knee Ankle 43     Ulnar Motor (Abd Dig Minimi)  29.9°C   Wrist 2.4 2.6 0.2 8.7 12.1 28.1 Wrist Abd Dig Minimi      B Elbow 5.6 5.6 0.0 7.7 10.8 28.7 B Elbow Wrist 59 60 1   A Elbow 7.3 7.3 0.0 7.5 10.0 25.0 A Elbow B Elbow 59 59 0     Comparison Left/Right Comparison     Stim Site L Lat (ms) R Lat (ms) L-R Lat (ms) L Amp (µV) R Amp (µV) L-R Amp (%)   Median/Ulnar Palm Comparison (Wrist)  32.5°C   Median Palm 1.2   81.9     Ulnar Palm 1.3   32.4           Waveforms:

## 2018-10-30 NOTE — PROGRESS NOTES
This was an elective EMG nerve conduction of patient's extremities with suggestion to diffuse paresthesias for several years. Patient history. As I understand the patient's story she has had global pins and needle sensation for 2 years and has no known background provocative situations such as diabetes or similar concerns such as overlap connective tissue disease. Patient exam.  Alert cooperative fluent and appropriate. Cranial nerves II through XII normal.  Cerebellar testing finger-nose-finger toe to finger intact. Motor 5/5. Sensory intact to touch temperature and vibratory. Reflexes +2. Gait and transfers normal.  No involuntary movement seen. EMG nerve conduction findings. 1.  EMG needle insertion and probing was normal.  There was no acute denervation pattern seen nor chronic denervation/reinnervation or myopathic potentials. Patient had no difficulty with any aspect of the exam.  Motor unit recruitment as to number morphology and time sequencing was all normal.    2.  The nerve conduction part looked basically normal.  There was a left peroneal nerve motor nerve conduction discrepancy around the fibular head but did not translate into a change of motor unit configuration or amplitude downstream.  Left median ulnar palmar sensory comparisons normal.  The left tibial and right left ulnar F waves were normal.  Left tibial H reflex was normal.    Impression: This is a normal EMG nerve conduction with evaluation of 3 limbs. The study does not rule out small fiber sensory neuropathy for what it is worth. Clinical correlation is advised.   VIVI PEREZ.

## 2018-11-07 ENCOUNTER — OFFICE VISIT (OUTPATIENT)
Dept: NEUROLOGY | Age: 53
End: 2018-11-07

## 2018-11-07 VITALS
SYSTOLIC BLOOD PRESSURE: 124 MMHG | WEIGHT: 144.2 LBS | RESPIRATION RATE: 18 BRPM | DIASTOLIC BLOOD PRESSURE: 70 MMHG | HEIGHT: 64 IN | OXYGEN SATURATION: 94 % | HEART RATE: 81 BPM | BODY MASS INDEX: 24.62 KG/M2

## 2018-11-07 RX ORDER — VITAMIN E CAP 100 UNIT 100 UNIT
CAP ORAL DAILY
COMMUNITY

## 2018-11-07 RX ORDER — LANOLIN ALCOHOL/MO/W.PET/CERES
1 CREAM (GRAM) TOPICAL DAILY
COMMUNITY

## 2018-11-07 NOTE — PROGRESS NOTES
Neurology Consult      Subjective:      Jefferson Clements is a 48 y.o. female who comes in today following completion of 3 limb EMG and nerve conduction assessment. This returned as normal with intentions now of getting a dedicated MRI of the neck with and without contrast to see if there is any other pathology yet to be discovered with the pervasive paresthesias. I cannot think of any additional testing in that regard that would otherwise be suggested. Has neuropsychological testing set for January and we will see where that goes. Did not mention any new difficulties today and her exam for me was otherwise normal.         Current Outpatient Medications   Medication Sig Dispense Refill    glucosamine-chondroitin (ARTHX) 500-400 mg cap Take 1 Cap by mouth daily.  vitamin e (E GEMS) 100 unit capsule Take  by mouth daily.  venlafaxine (EFFEXOR) 100 mg tablet TK 3 TS PO QD AT NOON  0    ARIPiprazole (ABILIFY) 5 mg tablet TK 2 TS PO QAM AFTER BREAKFAST  0    TURMERIC ROOT EXTRACT PO Take  by mouth.  ferrous sulfate (IRON) 325 mg (65 mg iron) tablet Take  by mouth Daily (before breakfast).  ginkgo biloba (GINKOBA PO) Take  by mouth.  multivitamin (ONE A DAY) tablet Take 1 Tab by mouth daily.  OLANZapine (ZYPREXA) 2.5 mg tablet Take  by mouth nightly.  Gfnrp2-[id number]-[id number]-E-FA-Fish Oil 864--775 mg-mg-mcg-mcg cap Take  by mouth.  OTHER [facility] medicine      Biotin 2,500 mcg cap Take  by mouth.         No Known Allergies  Past Medical History:   Diagnosis Date    Autonomous thyroid nodule 3/18/2017    Depression     Memory change     Multinodular goiter 3/18/2017    Musculoskeletal disorder     Snoring     Subclinical hyperthyroidism 1/11/2017      Past Surgical History:   Procedure Laterality Date    HX HYSTERECTOMY      HX TUBAL LIGATION        Social History     Socioeconomic History    Marital status:      Spouse name: Not on file    Number of children: Not on file    Years of education: Not on file    Highest education level: Not on file   Social Needs    Financial resource strain: Not on file    Food insecurity - worry: Not on file    Food insecurity - inability: Not on file    Transportation needs - medical: Not on file   [place] needs - non-medical: Not on file   Occupational History    Not on file   Tobacco Use    Smoking status: Never Smoker    Smokeless tobacco: Never Used   Substance and Sexual Activity    Alcohol use: Yes    Drug use: No    Sexual activity: Yes     Partners: Male   Other Topics Concern    Not on file   Social History Narrative    Not on file      Family History   Problem Relation Age of Onset    Hypertension Father     Dementia Father     Stroke Father       Visit Vitals  /70   Pulse 81   Resp 18   Ht 5' 4\" (1.626 m)   Wt 65.4 kg (144 lb 3.2 oz)   SpO2 94%   BMI 24.75 kg/m²        Review of Systems:   A comprehensive review of systems was negative except for that written in the HPI. Neuro Exam:     Appearance: The patient is well developed, well nourished, provides a coherent history and is in no acute distress. Mental Status: Oriented to time, place and person. Mood and affect appropriate. Cranial Nerves:   Intact visual fields. Fundi are benign. ILDEFONSO, EOM's full, no nystagmus, no ptosis. Facial sensation is normal. Corneal reflexes are intact. Facial movement is symmetric. Hearing is normal bilaterally. Palate is midline with normal sternocleidomastoid and trapezius muscles are normal. Tongue is midline. Motor:  5/5 strength in upper and lower proximal and distal muscles. Normal bulk and tone. No fasciculations. Reflexes:   Deep tendon reflexes 2+/4 and symmetrical.   Sensory:   Normal to touch, pinprick and vibration. Gait:  Normal gait. Tremor:   No tremor noted. Cerebellar:  No cerebellar signs present.    Neurovascular:  Normal heart sounds and regular rhythm, peripheral pulses intact, and no carotid bruits. Toes downgoing no clonus no Wiseman's. Lhermitte's negative. Assessment:   Global paresthesias. Have defied explanation of the at this point with a normal EMG and nerve conduction as recently accomplished we will set up MRI with and without contrast of the cervical spine area and see if there is. Occult pathology yet to be discovered. Cannot think of any other testing in that respect that needs to be done. Has neuropsych testing in January and will see where that goes.  informs me she is a previous rheumatology consultation in [facility] and there were no discoveries made. Is not interested in another opinion. Plan:   Revisit pended here.   Signed by :  Pipo Huffman MD

## 2018-11-07 NOTE — PATIENT INSTRUCTIONS
[address] [facility]   Statement to Patients  April 1, 2014      In an effort to ensure the large volume of patient prescription refills is processed in the most efficient and expeditious manner, we are asking our patients to assist us by calling your Pharmacy for all prescription refills, this will include also your  Mail Order Pharmacy. The pharmacy will contact our office electronically to continue the refill process. Please do not wait until the last minute to call your pharmacy. We need at least 48 hours (2days) to fill prescriptions. We also encourage you to call your pharmacy before going to  your prescription to make sure it is ready. With regard to controlled substance prescription refill requests (narcotic refills) that need to be picked up at our office, we ask your cooperation by providing us with at least 72 hours (3days) notice that you will need a refill. We will not refill narcotic prescription refill requests after 4:00pm on any weekday, Monday through Thursday, or after 2:00pm on Fridays, or on the weekends. We encourage everyone to explore another way of getting your prescription refill request processed using [employer], our patient web portal through our electronic medical record system. [employer] is an efficient and effective way to communicate your medication request directly to the office and  downloadable as an rich on your smart phone . [employer] also features a review functionality that allows you to view your medication list as well as leave messages for your physician. Are you ready to get connected? If so please review the attatched instructions or speak to any of our staff to get you set up right away! Thank you so much for your cooperation. Should you have any questions please contact our Practice Administrator.     The Physicians and Staff,  [facility] [address]  What is a living will? A living will is a legal form you use to write down the kind of care you want at the end of your life. It is used by the health professionals who will treat you if you aren't able to decide for yourself. If you put your wishes in writing, your loved ones and others will know what kind of care you want. They won't need to guess. This can ease your mind and be helpful to others. A living will is not the same as an estate or property will. An estate will explains what you want to happen with your money and property after you die. Is a living will a legal document? A living will is a legal document. Each state has its own laws about living roberts. If you move to another state, make sure that your living will is legal in the state where you now live. Or you might use a universal form that has been approved by many states. This kind of form can sometimes be completed and stored online. Your electronic copy will then be available wherever you have a connection to the Internet. In most cases, doctors will respect your wishes even if you have a form from a different state. · You don't need an  to complete a living will. But legal advice can be helpful if your state's laws are unclear, your health history is complicated, or your family can't agree on what should be in your living will. · You can change your living will at any time. Some people find that their wishes about end-of-life care change as their health changes. · In addition to making a living will, think about completing a medical power of  form. This form lets you name the person you want to make end-of-life treatment decisions for you (your \"health care agent\") if you're not able to. Many hospitals and nursing homes will give you the forms you need to complete a living will and a medical power of . · Your living will is used only if you can't make or communicate decisions for yourself anymore.  If you become able to make decisions again, you can accept or refuse any treatment, no matter what you wrote in your living will. · Your state may offer an online registry. This is a place where you can store your living will online so the doctors and nurses who need to treat you can find it right away. What should you think about when creating a living will? Talk about your end-of-life wishes with your family members and your doctor. Let them know what you want. That way the people making decisions for you won't be surprised by your choices. Think about these questions as you make your living will:  · Do you know enough about life support methods that might be used? If not, talk to your doctor so you know what might be done if you can't breathe on your own, your heart stops, or you're unable to swallow. · What things would you still want to be able to do after you receive life-support methods? Would you want to be able to walk? To speak? To eat on your own? To live without the help of machines? · If you have a choice, where do you want to be cared for? In your home? At a hospital or nursing home? · Do you want certain Latter-day practices performed if you become very ill? · If you have a choice at the end of your life, where would you prefer to die? At home? In a hospital or nursing home? Somewhere else? · Would you prefer to be buried or cremated? · Do you want your organs to be donated after you die? What should you do with your living will? · Make sure that your family members and your health care agent have copies of your living will. · Give your doctor a copy of your living will to keep in your medical record. If you have more than one doctor, make sure that each one has a copy. · You may want to put a copy of your living will where it can be easily found. Where can you learn more? Go to http://robi-antonio.info/.   Enter T022 in the search box to learn more about \"Learning About Living [place].\"  Current as of: April 19, 2018  Content Version: 11.8  © 0436-8254 [employer]. Care instructions adapted under license by [employer] (which disclaims liability or warranty for this information). If you have questions about a medical condition or this instruction, always ask your healthcare professional. [place] [address] any warranty or liability for your use of this information. Patient history reviewed and patient examined. Will order MRI of the neck to see if there is any other possibilities that have been missed in the general scheme of differential diagnosis. This would be unlikely fulminant cervical spondylosis or demyelinating disease etc. has a neuropsych test due in January and will see how those results go. Not interested in another rheumatology opinion.

## 2018-11-16 ENCOUNTER — TELEPHONE (OUTPATIENT)
Dept: ENDOCRINOLOGY | Age: 53
End: 2018-11-16

## 2018-11-16 DIAGNOSIS — E05.90 SUBCLINICAL HYPERTHYROIDISM: Primary | ICD-10-CM

## 2018-11-27 ENCOUNTER — HOSPITAL ENCOUNTER (OUTPATIENT)
Dept: MRI IMAGING | Age: 53
Discharge: HOME | End: 2018-11-27
Attending: SPECIALIST
Payer: OTHER GOVERNMENT

## 2018-11-27 PROCEDURE — 72156 MRI NECK SPINE W/O & W/DYE: CPT

## 2018-11-27 PROCEDURE — A9575 INJ GADOTERATE MEGLUMI 0.1ML: HCPCS | Performed by: SPECIALIST

## 2018-11-27 PROCEDURE — 74011250636 HC RX REV CODE- 250/636: Performed by: SPECIALIST

## 2018-11-27 RX ORDER — GADOTERATE MEGLUMINE 376.9 MG/ML
12 INJECTION INTRAVENOUS
Status: COMPLETED | OUTPATIENT
Start: 2018-11-27 | End: 2018-11-27

## 2018-11-27 RX ADMIN — GADOTERATE MEGLUMINE 12 ML: 376.9 INJECTION INTRAVENOUS at 09:00

## 2018-12-01 LAB
T4 FREE SERPL-MCNC: 1.24 NG/DL (ref 0.82–1.77)
TSH SERPL DL<=0.005 MIU/L-ACNC: 0.65 UIU/ML (ref 0.45–4.5)

## 2018-12-07 ENCOUNTER — OFFICE VISIT (OUTPATIENT)
Dept: ENDOCRINOLOGY | Age: 53
End: 2018-12-07

## 2018-12-07 VITALS
RESPIRATION RATE: 14 BRPM | DIASTOLIC BLOOD PRESSURE: 73 MMHG | TEMPERATURE: 98 F | OXYGEN SATURATION: 99 % | HEART RATE: 93 BPM | SYSTOLIC BLOOD PRESSURE: 114 MMHG | WEIGHT: 140.4 LBS | HEIGHT: 64 IN | BODY MASS INDEX: 23.97 KG/M2

## 2018-12-07 DIAGNOSIS — E04.2 MULTINODULAR GOITER: ICD-10-CM

## 2018-12-07 DIAGNOSIS — E05.90 SUBCLINICAL HYPERTHYROIDISM: Primary | ICD-10-CM

## 2018-12-07 DIAGNOSIS — E04.1 AUTONOMOUS THYROID NODULE: ICD-10-CM

## 2018-12-07 NOTE — PROGRESS NOTES
Sheila Elizondo is a 48 y.o. female here for   Chief Complaint   Patient presents with    Thyroid Problem       1. Have you been to the ER, urgent care clinic since your last visit? Hospitalized since your last visit? -no    2. Have you seen or consulted any other health care providers outside of the [address] [place] [address] since your last visit? Include any pap smears or colon screening. -PCP and Neurologist

## 2018-12-07 NOTE — PROGRESS NOTES
[facility]               Sarah Marr MD              [address] [id number]           Patient Information  Date:12/7/2018  Name : Sheila Elizondo 48 y.o.     YOB: 1965         Referred by: Minna Painter MD         Chief Complaint   Patient presents with    Thyroid Problem       History of present illness    Naveen Rich is a 48 y.o. female  here for  Fu of hyperthyroidism. she was initially referred for abnormal thyroid function tests and Multinodular goiter     She was on Lithium for 30 years which was stopped in Sept 2016 , also on [place]  She has autonomous right thyroid nodule, status post radioactive iodine therapy December 2017  Thyroid uptake and scan - hyperfunctioning right thyroid nodule -     She complains of hair loss, has tried minoxidil, biotin, on iron supplements. She is menopausal      No FH of thyroid disease. No FH of thyroid cancer     Wt Readings from Last 3 Encounters:   12/07/18 140 lb 6.4 oz (63.7 kg)   11/07/18 144 lb 3.2 oz (65.4 kg)   10/10/18 143 lb (64.9 kg)       Past Medical History:   Diagnosis Date    Autonomous thyroid nodule 3/18/2017    Depression     Memory change     Multinodular goiter 3/18/2017    Musculoskeletal disorder     Snoring     Subclinical hyperthyroidism 1/11/2017       Current Outpatient Medications   Medication Sig    conjugated estrogens (PREMARIN) 0.625 mg/gram vaginal cream Apply 0.5 g to affected area as needed.  glucosamine-chondroitin (ARTHX) 500-400 mg cap Take 1 Cap by mouth daily.  vitamin e (E GEMS) 100 unit capsule Take  by mouth daily.  ARIPiprazole (ABILIFY) 5 mg tablet TK 2 TS PO QAM AFTER BREAKFAST    TURMERIC ROOT EXTRACT PO Take  by mouth.  ferrous sulfate (IRON) 325 mg (65 mg iron) tablet Take  by mouth Daily (before breakfast).  ginkgo biloba (GINKOBA PO) Take  by mouth two (2) times a day.     multivitamin (ONE A DAY) tablet Take 1 Tab by mouth daily.  Mzowh2-[id number]-[id number]-E-FA-Fish Oil 098--601 mg-mg-mcg-mcg cap Take  by mouth. No current facility-administered medications for this visit. Review of Systems:  - Gastrointestinal: no dysphagia  - Musculoskeletal: + joint pains or weakness  - Integumentary: no rashes  - Neurological: no numbness, tingling,   - Psychiatric: no depression or anxiety  - Endocrine: + cold intolerance,    Physical Examination:  Blood pressure 114/73, pulse 93, temperature 98 °F (36.7 °C), temperature source Oral, resp. rate 14, height 5' 4\" (1.626 m), weight 140 lb 6.4 oz (63.7 kg), SpO2 99 %. Body mass index is 24.1 kg/m². - General: pleasant, no distress, good eye contact  - HEENT: no exopthalmos, no periorbital edema, no scleral/conjunctival injection, EOMI, no lid lag or stare  - Neck: supple, right thyroid nodule 2  cm ,non tender , no bruit   - Cardiovascular:regular,  normal S1 and S2,   - Respiratory: clear to auscultation bilaterally  - Gastrointestinal: soft, nontender, nondistended, BS +  - Neurological: alert and oriented   - Psychiatric: normal mood and affect  - Skin - normal turgor    Data Reviewed:     Hyperfunctioning nodule in the right mid to lower pole suppressing  the remainder of the gland. Thyroid uptake measures 18.3% which is within normal  limits. [] Reviewed labs    Assessment/Plan:     No diagnosis found. Multinodular goiter, toxic  Thyroid uptake and scan - hyperfunctioning right thyroid nodule -   Autonomous thyroid nodule -  QUIÑONES 12/17   Euthyroid now     Right thyroid nodule 2.6 cm in 2016   US 10/17 3.2 cm right thyroid nodule -  Ultrasound thyroid June 2018 -2.2 cm right thyroid nodule, she is euthyroid, status post radioactive iodine therapy, since it is persisting proceed with fine-needle aspiration biopsy, pathology showed colloid with no cells which is expected.   Monitor the size      Alopecia: Has tried minoxidil, ferrous sulfate, biotin  Dermatology input  Could be related to the menopause      Follow-up Disposition: Not on File    Thank you for allowing me to participate in the care of this patient.     Gopal Hemphill MD      Patient verbalized understanding

## 2019-01-17 ENCOUNTER — OFFICE VISIT (OUTPATIENT)
Dept: NEUROLOGY | Age: 54
End: 2019-01-17

## 2019-01-17 DIAGNOSIS — F43.21 ADJUSTMENT DISORDER WITH DEPRESSED MOOD: ICD-10-CM

## 2019-01-17 DIAGNOSIS — F25.8 OTHER SCHIZOAFFECTIVE DISORDERS (HCC): ICD-10-CM

## 2019-01-17 DIAGNOSIS — G31.84 MILD COGNITIVE IMPAIRMENT: Primary | ICD-10-CM

## 2019-01-17 DIAGNOSIS — R41.3 SHORT-TERM MEMORY LOSS: ICD-10-CM

## 2019-01-17 DIAGNOSIS — R47.89 WORD FINDING DIFFICULTY: ICD-10-CM

## 2019-01-17 NOTE — PROGRESS NOTES
[facility],[address]  [address]   [place] [address] [place] [address] [address]   [phone] Office   [phone] Fax      Neuropsychology    Initial Diagnostic Interview Note      Referral:  Gurmeet Mackey MD, Dr. Flory Gomez Dr. Ilda Ruggiero is a 48 y.o. right handed  American female who was accompanied by her spouse to the initial clinical interview on 1/17/19. Please refer to her medical records for details pertaining to her history. Briefly, the patient reported that she completed one year of college without history of previously diagnosed LD and/or receipt of special education services. 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 She has noticed a one year decline in short term memory. She forgets the content of conversations. Misplaces things. She is losing words. Longer to process information. Slower to comprehend and learn new things. Draws mental blanks. This has been going on at least a year now and getting worse. She has a history of schizoaffective disorder diagnosed 30 years and has been on medication for that entire time but always had residual symptoms including hyper religiousity, hallucinations, suicide attempts, and psychiatric hospitalizations. In the past nine months, she has benefited greatly from psychiatric intervention and these symptoms have diminished complete. She hears voices and has had abrupt behavioral changes. Now, she can tell the difference between what is real and what is not. She is on Zyprexa and ability, as well as venlafaxine and numerous OTC vitamins and such. She is engaged in psychiatric care. Her father had dementia. She has had no issues when driving, takes her own medications, and does her own ADLs. Spouse does the finances. She does get depressed from time to time. Sleep fluctuates/varies.   No background stroke, meningitis/encephalitis, VERA Fever, Lupus, Lyme, TBI, sz, etc.  She feels tired/sleepy often. She will pay and go to bed at night, sometimes 2, 3 in the morning. She does spend time on the computer and does housework. She has thyroid issues and followed by an endocrinologist and also has hyperthyroidism. NO changes in sense of taste or smell. No previous neuropsych testing.       Neuropsychological Mental Status Exam (NMSE):  Historian: Good  Praxis: No UE apraxia  R/L Orientation: Intact to self and to other  Dress: within normal limits   Weight: within normal limits   Appearance/Hygiene: within normal limits   Gait: within normal limits   Assistive Devices: None  Mood: within normal limits   Affect: within normal limits   Comprehension: within normal limits   Thought Process: within normal limits   Expressive Language: ESL  Receptive Language: within normal limits   Motor:  No cognitive or motor perseveration  ETOH: Glass of wine with dinner, sometimes  Tobacco: Denied  Illicit: Denied  SI/HI: Denied current. Yes in the past.    Psychosis: Last known hallucination was a few months ago, meds were adjusted, and not since. Patient says she last heard voices in the 1990s, and more mild after that. Insight: Within normal limits  Judgment: Within normal limits  Other Psych:      She used to not have an accident, and had a major outbreak of psychosis in the 1990s and then developed an accent, and there are communication problems in the marriage and defensiveness and feelings like one is controlling the other and this is enhancing stress also. No counseling currently.       Past Medical History:   Diagnosis Date    Autonomous thyroid nodule 3/18/2017    Depression     Memory change     Multinodular goiter 3/18/2017    Musculoskeletal disorder     Snoring     Subclinical hyperthyroidism 1/11/2017       Past Surgical History:   Procedure Laterality Date    HX HYSTERECTOMY      HX TUBAL LIGATION No Known Allergies    Family History   Problem Relation Age of Onset    Hypertension Father     Dementia Father     Stroke Father        Social History     Tobacco Use    Smoking status: Never Smoker    Smokeless tobacco: Never Used   Substance Use Topics    Alcohol use: Yes    Drug use: No       Current Outpatient Medications   Medication Sig Dispense Refill    conjugated estrogens (PREMARIN) 0.625 mg/gram vaginal cream Apply 0.5 g to affected area as needed.  glucosamine-chondroitin (ARTHX) 500-400 mg cap Take 1 Cap by mouth daily.  vitamin e (E GEMS) 100 unit capsule Take  by mouth daily.  ARIPiprazole (ABILIFY) 5 mg tablet TK 2 TS PO QAM AFTER BREAKFAST  0    TURMERIC ROOT EXTRACT PO Take  by mouth.  ferrous sulfate (IRON) 325 mg (65 mg iron) tablet Take  by mouth Daily (before breakfast).  ginkgo biloba (GINKOBA PO) Take  by mouth two (2) times a day.  multivitamin (ONE A DAY) tablet Take 1 Tab by mouth daily.  Cdzuf4-[id number]-S05-E-FA-Fish Oil 931--037 mg-mg-mcg-mcg cap Take  by mouth. Plan:  Obtain authorization for testing from insurance company. Report to follow once testing, scoring, and interpretation completed. ? Organic based neurocognitive issues versus mood disorder or combination of same. ? Problems organic, functional, or both? This note will not be viewable in [address] 48year old female here on referral from NEurology with cognitive deficits of unknown etiology. More chronic history of schizoaffective disorder and engaged in psychiatric intervention there. ? Organic based cognitive decline versus mood or age or combination of same? Time: 96116 x 1 NSE 45 minutes spent reviewing paper records, interviewing patient and spouse.

## 2019-01-29 ENCOUNTER — OFFICE VISIT (OUTPATIENT)
Dept: NEUROLOGY | Age: 54
End: 2019-01-29

## 2019-01-29 DIAGNOSIS — F43.10 PTSD (POST-TRAUMATIC STRESS DISORDER): ICD-10-CM

## 2019-01-29 DIAGNOSIS — G31.84 MILD COGNITIVE IMPAIRMENT: Primary | ICD-10-CM

## 2019-01-29 DIAGNOSIS — F25.0 SCHIZOAFFECTIVE DISORDER, BIPOLAR TYPE (HCC): ICD-10-CM

## 2019-01-29 DIAGNOSIS — F41.9 ANXIETY WITH SOMATIZATION: ICD-10-CM

## 2019-01-29 DIAGNOSIS — F22 PARANOIA (HCC): ICD-10-CM

## 2019-01-29 DIAGNOSIS — F45.0 ANXIETY WITH SOMATIZATION: ICD-10-CM

## 2019-01-29 DIAGNOSIS — F90.0 ATTENTION DEFICIT HYPERACTIVITY DISORDER (ADHD), INATTENTIVE TYPE, MODERATE: Chronic | ICD-10-CM

## 2019-01-31 NOTE — PROGRESS NOTES
[address] [facility],[address]Th [address]  [address]   [place] [address]   [address]   [phone] Office   [phone] Fax      Neuropsychological Evaluation Report    Referral:  Juan Jarquin MD, Dr. Inna Camargo, Dr. Elder Llamas is a 48 y.o. right handed  American female who was accompanied by her spouse to the initial clinical interview on 1/17/19. Please refer to her medical records for details pertaining to her history. Briefly, the patient reported that she completed one year of college without history of previously diagnosed LD and/or receipt of special education services. 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 She has noticed a one year decline in short term memory. She forgets the content of conversations. Misplaces things. She is losing words. Longer to process information. Slower to comprehend and learn new things. Draws mental blanks. This has been going on at least a year now and getting worse. She has a history of schizoaffective disorder diagnosed 30 years and has been on medication for that entire time but always had residual symptoms including hyper religiousity, hallucinations, suicide attempts, and psychiatric hospitalizations. In the past nine months, she has benefited greatly from psychiatric intervention and these symptoms have diminished complete. She hears voices and has had abrupt behavioral changes. Now, she can tell the difference between what is real and what is not. She is on Zyprexa and ability, as well as venlafaxine and numerous OTC vitamins and such. She is engaged in psychiatric care. Her father had dementia. She has had no issues when driving, takes her own medications, and does her own ADLs. Spouse does the finances. She does get depressed from time to time. Sleep fluctuates/varies.   No background stroke, meningitis/encephalitis, VERA Fever, Lupus, Lyme, TBI, sz, etc.  She feels tired/sleepy often. She will pay and go to bed at night, sometimes 2, 3 in the morning. She does spend time on the computer and does housework. She has thyroid issues and followed by an endocrinologist and also has hyperthyroidism. NO changes in sense of taste or smell. No previous neuropsych testing.       Neuropsychological Mental Status Exam (NMSE):  Historian: Good  Praxis: No UE apraxia  R/L Orientation: Intact to self and to other  Dress: within normal limits   Weight: within normal limits   Appearance/Hygiene: within normal limits   Gait: within normal limits   Assistive Devices: None  Mood: within normal limits   Affect: within normal limits   Comprehension: within normal limits   Thought Process: within normal limits   Expressive Language: ESL  Receptive Language: within normal limits   Motor:  No cognitive or motor perseveration  ETOH: Glass of wine with dinner, sometimes  Tobacco: Denied  Illicit: Denied  SI/HI: Denied current. Yes in the past.    Psychosis: Last known hallucination was a few months ago, meds were adjusted, and not since. Patient says she last heard voices in the 1990s, and more mild after that. Insight: Within normal limits  Judgment: Within normal limits  Other Psych:      She used to not have an accent  and had a major outbreak of psychosis in the 1990s and then developed an accent, and there are communication problems in the marriage and defensiveness and feelings like one is controlling the other and this is enhancing stress also. No counseling currently.       Past Medical History:   Diagnosis Date    Autonomous thyroid nodule 3/18/2017    Depression     Memory change     Multinodular goiter 3/18/2017    Musculoskeletal disorder     Snoring     Subclinical hyperthyroidism 1/11/2017       Past Surgical History:   Procedure Laterality Date    HX HYSTERECTOMY      HX TUBAL LIGATION         No Known Allergies    Family History   Problem Relation Age of Onset    Hypertension Father     Dementia Father     Stroke Father        Social History     Tobacco Use    Smoking status: Never Smoker    Smokeless tobacco: Never Used   Substance Use Topics    Alcohol use: Yes    Drug use: No       Current Outpatient Medications   Medication Sig Dispense Refill    conjugated estrogens (PREMARIN) 0.625 mg/gram vaginal cream Apply 0.5 g to affected area as needed.  glucosamine-chondroitin (ARTHX) 500-400 mg cap Take 1 Cap by mouth daily.  vitamin e (E GEMS) 100 unit capsule Take  by mouth daily.  ARIPiprazole (ABILIFY) 5 mg tablet TK 2 TS PO QAM AFTER BREAKFAST  0    TURMERIC ROOT EXTRACT PO Take  by mouth.  ferrous sulfate (IRON) 325 mg (65 mg iron) tablet Take  by mouth Daily (before breakfast).  ginkgo biloba (GINKOBA PO) Take  by mouth two (2) times a day.  multivitamin (ONE A DAY) tablet Take 1 Tab by mouth daily.  Rrlex8-NviL9-T78-E-FA-Fish Oil 790--810 mg-mg-mcg-mcg cap Take  by mouth. Plan:  Obtain authorization for testing from insurance company. Report to follow once testing, scoring, and interpretation completed. ? Organic based neurocognitive issues versus mood disorder or combination of same. ? Problems organic, functional, or both? This note will not be viewable in [address] 48year old female here on referral from NEurology with cognitive deficits of unknown etiology. More chronic history of schizoaffective disorder and engaged in psychiatric intervention there. ? Organic based cognitive decline versus mood or age or combination of same? Time: 96116 x 1 NSE 45 minutes spent reviewing paper records, interviewing patient and spouse.      Neuropsychological Evaluation  Patient Testing 1/29/19 Report Completed 1/31/19  A Psychometrist Assisted w/ portions of this evaluation while under my direct supervision    Please refer to the patient's initial interview progress note (copied above) and her medical records for details pertaining to her history. Today's neuropsychological test scores follow: The following assessment procedures were performed:      Neuropsychologist Performed, Interpreted, & Reported: Neuropsychological Mental Status Exam, Revised Memory & Behavior Checklist, Mini Mental State Exam, Clock Drawing Test, Test Of Premorbid Functioning, Quintin-Melzack Pain Questionnaire,  History Taking  & Clinical Interview With The Patient, Additional History Taking w/ The Patient's Spouse, Review Of Available Records. Psychometrist Administered & Neuropsychologist Interpreted & Neuropsychologist Reported: Finger Tapping Test, Grooved Pegboard Test, Speech-Sounds Perception Test, Empathy Marketing, Wechsler Adult Intelligence Scale - IV, Verbal Fluency Tests, [place] & [place] - Revised, Trailmaking Test Parts A & B, [place] Verbal Learning Test - 3, Elia Complex Figure Test, Sosa Depression Inventory - II, Sosa Anxiety Inventory, Personality Assessment Inventory. Blanca Continuous Performance Test - III      Test Findings:  Note:  The patients raw data have been compared with currently available norms which include demographic corrections for age, gender, and/or education. Sometimes, the patients scores are compared to demographically similar individuals as close to the patients age, education level, etc., as possible. \"Average\" is viewed as being +/- 1 standard deviation (SD) from the stated mean for a particular test score. \"Low average\" is viewed as being between 1 and 2 SD below the mean, and above average is viewed as being 1 and 2 SD above the mean. Scores falling in the borderline range (between 1-1/2 and 2 SD below the mean) are viewed with particular attention as to whether they are normal or abnormal neurocognitive test scores.   Other methods of inference in analyzing the test data are also utilized, including the pattern and range of scores in the profile, bilateral motor functions, and the presence, if any, of pathognomonic signs. Behaviorally, the patient was friendly and cooperative and appeared motivated to perform well during this examination. ESL issues were considered in the interpretation of these tests. Within this context, the results of this evaluation are viewed as a valid reflection of the patients actual neurocognitive and emotional status. Her structured word list fluency, as assessed by the FAS Test, was within the below average range with a T score of 40. Category fluency was within the mildly impaired range with a T score of 39. Confrontation naming ability, as assessed by the [facility] - Revised, was within the moderately to severely impaired range at 34/60 correct (T = 24). There is variation in these scores and ESL interference is likely. The patient was administered the [facility] Continuous Performance Test - III, a computer-administered test of sustained attention, and review of the subscales within this instrument revealed mild concern for inattentiveness without impulsivity. Verbal auditory attention and discrimination, as assessed by the Speech-Sounds Perception Test (T = 33) was within the mildly impaired range. Nonverbal auditory attention and discrimination, as assessed by the [facility] Rhythm Test (T = 41) was within the below average range. This pattern of performance is  indicative of a patient who is at increased risk for day-to-day problems with sustained visual attention/concentration and verbal auditory attention. Nonverbal auditory attention was normal.       The patient was administered the Wechsler Adult Intelligence Scale - IV. See Appendix I for full breakdown of IQ test scores (scanned into media section of this EMR).   As can be seen, there was a clinically significant difference between her low average range Working Memory Index score of 89 (23rd %ile) and her high average range Processing Speed Index score of 111 (77th %ile). Her Verbal Comprehension Index score of 98 was within the average range. Her Perceptual Reasoning Index score of 88 was low average. Working memory is lower than expected based on her performance on a task estimating premorbid functioning levels. This often signals the presence of functional interference. The patient was administered the [place] Verbal Learning Test  - 3 and generated a normal range and positive learning curve over five repeated auditory word list learning trials. An interference trial was within normal limits. Free and cued, short and long delayed recall were within or above the normal range. Recognition and forced choice recall were within normal limits. This pattern of performance is not indicative of a patient who is at increased risk for day-to-day problems with auditory learning and/or memory. The patients performance on the copy portion of the Elia Complex Figure Test was within normal limits  (>16th %ile). Recall for the complex design was within the normal range after both short and long delays. Recognition recall was normal.  This pattern of performance is not indicative of a patient who is at increased risk for day-to-day problems with visual organization and visual delayed memory. Simple timed visual motor sequencing (Trailmaking Test Part A) was within the above average range with a T score of 61. Her performance on a similar, but more complex task of timed visual motor sequencing (Trailmaking Test Part B) was within the average range with a T score of 53. She made zero sequencing errors on this latter test. Taken together, this pattern of performance is not indicative of a patient who is at increased risk for day-to-day problems with executive functioning. Motor speed for finger tapping was within the normal range bilaterally.   Fine motor dexterity, as assessed by the [employer], was within the normal range bilaterally. This pattern of performance is not indicative of a patient who is at increased risk for day-to-day problems with bilateral motor speed and dexterity. The patient rated her current level of pain as \"2/5- Discomforting\" on the Quintin-Melzack Pain Questionnaire. She reported pain in her knees, lower legs, and feet. Shaneka Schmidt Her Sosa Depression Inventory -II score of 9 was within the minimally depressed range. Her Sosa Anxiety Inventory score of 6 reflected minimal anxiety. On the SCL-90, the patient reported marked concerns for somatization, depression, paranoia, and PTSD type issues. Impressions & Recommendations: This patient generated a predominantly normal range Neuropsychological Evaluation with respect to neurocognitive functioning. In this regard, the only impairments noted were for sustained visual attention and verbal auditory attention. She is showing mild problems with verbal fluency and naming but ESL is likely the factor there. Otherwise, her performance across all other neurocognitive domains assessed, including mental status, nonverbal auditory attention,  auditory learning and memory, visual organization, visual memory, working memory, processing speed, perceptual reasoning, verbal comprehension, executive functioning, and bilateral motor skills were within normal limits. I note that working memory is low relative to processing speed in a manner often seen in cases of functional interference. The patient has a history of schizoaffective disorder, bipolar type, and is reporting paranoia, anxiety, and PTSD,as well as depression and somatization during this examination. It is my opinion that the patient's reported (but not clinically corroborated) day-to-day memory problems are secondary to emotional distress and chronic attention deficit type concerns.   She has benefited well from recent psychiatric medication changes from a schizoaffective standpoint, though she continues to deal with anxiety and paranoia and is reporting PTSD issues as well. I suggest a review of her psychiatric medication management for anxiety, continued treatment for schizoaffective disorder, mental health counseling, and consider an appropriate medication for attention if not medically contraindicated. No dementia is seen here, and this is very reassuring news. Stay mentally, physically, and socially active. I am not concerned about competency, driving, day-to-day supervision, etc., from a neurocognitive standpoint, but psychiatric exacerbation will interfere with day-to-day functioning to some degree. Marriage counseling is also advised. Baseline now established. Follow up prn.  problem. Clinical correlation is, of course, indicated. I will discuss these findings with the patient when she follows up with me in the near future. A follow up Neuropsychological Evaluation is indicated on a prn basis, especially if there are any cognitive and/or emotional changes. DIAGNOSES:   The Referral Diagnosis Of  Cognitive Difficulties - IS NOT SUPPORTED      Schizoaffective Disorder, Bipolar Type      Anxiety Disorder - Severe, With Somatization      PTSD      Chronic ADHD, Inattentive, Mild To Moderate           The above information is based upon information currently available to me. If there is any additional information of which I am currently unaware, I would be more than happy to review it upon having it made available to me. Thank you for the opportunity to see this interesting individual.     Sincerely,       Kevin Draper.  Milo Monet, EdS    CC: MD Dr. Tamanna Gutierres, Dr. Michelle Iniguez    Time Documentation:    78889 x 1: Neurobehavioral Status Exam/Clinical Interview: (1 hour, already billed on first date of service)  17687 x 1 Neuropsych testing/data gathering by neuropsychologist (35 minutes)    [phone] x 1  96139 x 8 Test Administration/Data Gathering By Technician: (4.5 hours). 17633 x 1 (first 30 minutes), 96138 x 8 (each additional 30 minutes)    Testing evaluation services by Neuropsychologist (1 hour, 50 minutes)   96132 x 1 (first hour), 96133 x 1 (50 minutes)  This includes review of referral question, reviewing records, planning test battery (30 minutes), interpreting initial data (30 minutes), and interpretation and report writing (50 minutes)       Anticipated Integrated Feedback (30151) - Service to be completed on a future date and not currently billed. Definitions:      35625/98377:  Neurobehavioral Status Exam, Clinical interview. Clinical assessment of thinking, reasoning and judgment, by neuropsychologist, both face to face time with patient and time interpreting those test results and reporting, first and subsequent hours)    99745/95584: Neuropsychological Test Administration by Technician/Psychometrist, first 30 minutes and each additional 30 minutes. The above includes: Record review. Review of history provided by patient. Review of collaborative information. Testing by Clinician. Review of raw data. Scoring. Report writing of individual tests administered by Clinician. Integration of individual tests administered by psychometrist with NSE/testing by clinician, review of records/history/collaborative information, case Conceptualization, treatment planning, clinical decision making, report writing, coordination Of Care. Psychometry test codes as time spent by psychometrist administering and scoring neurocognitive/psychological tests under supervision of neuropsychologist.  Integral services including scoring of raw data, data interpretation, case conceptualization, report writing etcetera were initiated after the patient finished testing/raw data collected and was completed on the date the report was signed.

## 2019-02-15 ENCOUNTER — OFFICE VISIT (OUTPATIENT)
Dept: NEUROLOGY | Age: 54
End: 2019-02-15

## 2019-02-15 VITALS
HEART RATE: 92 BPM | WEIGHT: 143.6 LBS | SYSTOLIC BLOOD PRESSURE: 124 MMHG | HEIGHT: 64 IN | OXYGEN SATURATION: 98 % | RESPIRATION RATE: 16 BRPM | DIASTOLIC BLOOD PRESSURE: 76 MMHG | BODY MASS INDEX: 24.52 KG/M2

## 2019-02-15 DIAGNOSIS — R41.3 MEMORY CHANGES: Primary | ICD-10-CM

## 2019-02-15 RX ORDER — MULTIVIT WITH MINERALS/HERBS
1 TABLET ORAL DAILY
COMMUNITY

## 2019-02-15 RX ORDER — CHOLECALCIFEROL (VITAMIN D3) 125 MCG
CAPSULE ORAL
COMMUNITY

## 2019-02-15 NOTE — PATIENT INSTRUCTIONS
[facility]   Statement to Patients  April 1, 2014      In an effort to ensure the large volume of patient prescription refills is processed in the most efficient and expeditious manner, we are asking our patients to assist us by calling your Pharmacy for all prescription refills, this will include also your  Mail Order Pharmacy. The pharmacy will contact our office electronically to continue the refill process. Please do not wait until the last minute to call your pharmacy. We need at least 48 hours (2days) to fill prescriptions. We also encourage you to call your pharmacy before going to  your prescription to make sure it is ready. With regard to controlled substance prescription refill requests (narcotic refills) that need to be picked up at our office, we ask your cooperation by providing us with at least 72 hours (3days) notice that you will need a refill. We will not refill narcotic prescription refill requests after 4:00pm on any weekday, Monday through Thursday, or after 2:00pm on Fridays, or on the weekends. We encourage everyone to explore another way of getting your prescription refill request processed using [employer], our patient web portal through our electronic medical record system. [employer] is an efficient and effective way to communicate your medication request directly to the office and  downloadable as an rich on your smart phone . [employer] also features a review functionality that allows you to view your medication list as well as leave messages for your physician. Are you ready to get connected? If so please review the attatched instructions or speak to any of our staff to get you set up right away! Thank you so much for your cooperation. Should you have any questions please contact our Practice Administrator.     The Physicians and Staff,  [facility] [address]  What is a living will?    A living will is a legal form you use to write down the kind of care you want at the end of your life. It is used by the health professionals who will treat you if you aren't able to decide for yourself. If you put your wishes in writing, your loved ones and others will know what kind of care you want. They won't need to guess. This can ease your mind and be helpful to others. A living will is not the same as an estate or property will. An estate will explains what you want to happen with your money and property after you die. Is a living will a legal document? A living will is a legal document. Each state has its own laws about living roberts. If you move to another state, make sure that your living will is legal in the state where you now live. Or you might use a universal form that has been approved by many states. This kind of form can sometimes be completed and stored online. Your electronic copy will then be available wherever you have a connection to the Internet. In most cases, doctors will respect your wishes even if you have a form from a different state. · You don't need an  to complete a living will. But legal advice can be helpful if your state's laws are unclear, your health history is complicated, or your family can't agree on what should be in your living will. · You can change your living will at any time. Some people find that their wishes about end-of-life care change as their health changes. · In addition to making a living will, think about completing a medical power of  form. This form lets you name the person you want to make end-of-life treatment decisions for you (your \"health care agent\") if you're not able to. Many hospitals and nursing homes will give you the forms you need to complete a living will and a medical power of . · Your living will is used only if you can't make or communicate decisions for yourself anymore.  If you become able to make decisions again, you can accept or refuse any treatment, no matter what you wrote in your living will. · Your state may offer an online registry. This is a place where you can store your living will online so the doctors and nurses who need to treat you can find it right away. What should you think about when creating a living will? Talk about your end-of-life wishes with your family members and your doctor. Let them know what you want. That way the people making decisions for you won't be surprised by your choices. Think about these questions as you make your living will:  · Do you know enough about life support methods that might be used? If not, talk to your doctor so you know what might be done if you can't breathe on your own, your heart stops, or you're unable to swallow. · What things would you still want to be able to do after you receive life-support methods? Would you want to be able to walk? To speak? To eat on your own? To live without the help of machines? · If you have a choice, where do you want to be cared for? In your home? At a hospital or nursing home? · Do you want certain Sikh practices performed if you become very ill? · If you have a choice at the end of your life, where would you prefer to die? At home? In a hospital or nursing home? Somewhere else? · Would you prefer to be buried or cremated? · Do you want your organs to be donated after you die? What should you do with your living will? · Make sure that your family members and your health care agent have copies of your living will. · Give your doctor a copy of your living will to keep in your medical record. If you have more than one doctor, make sure that each one has a copy. · You may want to put a copy of your living will where it can be easily found. Where can you learn more? Go to http://robi-antonio.info/. Enter G492 in the search box to learn more about \"Learning About Living Tammy Blakelyr. \"  Current as of: April 18, 2018  Content Version: 11.9  © 1254-7660 [employer]. Care instructions adapted under license by [employer] (which disclaims liability or warranty for this information). If you have questions about a medical condition or this instruction, always ask your healthcare professional. [place] [address] any warranty or liability for your use of this information. We went over the concluding information forwarded by the neuropsych testing and revisited the results of the EEG lab work and brain MRI. Patient should follow-up with her psychiatrist and discuss these results and the options available to her including psychology. Good luck on all matters.

## 2019-02-15 NOTE — PROGRESS NOTES
Neurology Consult      Subjective:      Jay Buchanan is a 47 y.o. female Who comes in today with her spouse. This was the revisit for the neuropsych results in lieu of previous test results already accomplished. That included a normal EEG lab work and brain MRI this scan of which showed chronic white matter changes only. The neuropsych testing suggest memory changes secondary to emotional distress and chronic ADD. There was a suggestion to review psych medication management and mental health counseling. Stay busy. Was found to be competent and driving is okay. Marriage counseling advised. Suggestions to posttraumatic stress disorder and anxiety of a severe nature with somatization. Also schizoaffective disorder bipolar etc.  seemed to  on this immediately and with my input and his overview I think we came to  and explaining to his wife what the deal is at this particular visit. I will make no particular recommendations beyond the obvious except return to psychiatry and psychological services would be advised. Good luck and I hope there is a great return of function and overall improvement. Current Outpatient Medications   Medication Sig Dispense Refill    cholecalciferol, vitamin D3, 2,000 unit tab Take  by mouth.  b complex vitamins (B COMPLEX 1) tablet Take 1 Tab by mouth daily.  ARIPiprazole (ABILIFY) 5 mg tablet TK 2 TS PO QAM AFTER BREAKFAST  0    TURMERIC ROOT EXTRACT PO Take  by mouth.  multivitamin (ONE A DAY) tablet Take 1 Tab by mouth daily.  Ewwrc6-EajC6-U84-E-FA-Fish Oil 150--562 mg-mg-mcg-mcg cap Take  by mouth.  conjugated estrogens (PREMARIN) 0.625 mg/gram vaginal cream Apply 0.5 g to affected area as needed.  glucosamine-chondroitin (ARTHX) 500-400 mg cap Take 1 Cap by mouth daily.  vitamin e (E GEMS) 100 unit capsule Take  by mouth daily.       ferrous sulfate (IRON) 325 mg (65 mg iron) tablet Take  by mouth Daily (before breakfast).  ginkgo biloba (GINKOBA PO) Take  by mouth two (2) times a day. No Known Allergies  Past Medical History:   Diagnosis Date    Autonomous thyroid nodule 3/18/2017    Depression     Memory change     Multinodular goiter 3/18/2017    Musculoskeletal disorder     Snoring     Subclinical hyperthyroidism 1/11/2017      Past Surgical History:   Procedure Laterality Date    HX HYSTERECTOMY      HX TUBAL LIGATION        Social History     Socioeconomic History    Marital status:      Spouse name: Not on file    Number of children: Not on file    Years of education: Not on file    Highest education level: Not on file   Social Needs    Financial resource strain: Not on file    Food insecurity - worry: Not on file    Food insecurity - inability: Not on file   [employer] needs - medical: Not on file   [employer] needs - non-medical: Not on file   Occupational History    Not on file   Tobacco Use    Smoking status: Never Smoker    Smokeless tobacco: Never Used   Substance and Sexual Activity    Alcohol use: Yes    Drug use: No    Sexual activity: Yes     Partners: Male   Other Topics Concern    Not on file   Social History Narrative    Not on file      Family History   Problem Relation Age of Onset    Hypertension Father     Dementia Father     Stroke Father       Visit Vitals  /76   Pulse 92   Resp 16   Ht 5' 4\" (1.626 m)   Wt 65.1 kg (143 lb 9.6 oz)   SpO2 98%   BMI 24.65 kg/m²        Review of Systems:   A comprehensive review of systems was negative except for that written in the HPI. Neuro Exam:     Appearance: The patient is well developed, well nourished, provides a partial coherent history and is in no acute distress. Mental Status: Oriented to time, place and person. Mood and affect appropriate. Cranial Nerves:   Intact visual fields. Fundi are benign. ILDEFONSO, EOM's full, no nystagmus, no ptosis.  Facial sensation is normal. Corneal reflexes are intact. Facial movement is symmetric. Hearing is normal bilaterally. Palate is midline with normal sternocleidomastoid and trapezius muscles are normal. Tongue is midline. Motor:  5/5 strength in upper and lower proximal and distal muscles. Normal bulk and tone. No fasciculations. Reflexes:   Deep tendon reflexes 2+/4 and symmetrical.   Sensory:   Normal to touch, pinprick and vibration. Gait:  Normal gait. Tremor:   No tremor noted. Cerebellar:  No cerebellar signs present. Neurovascular:  Normal heart sounds and regular rhythm, peripheral pulses intact, and no carotid bruits. Assessment:   Memory concerns. At this juncture patient will return to her psychiatrist to evaluate medication management and will review findings on formal neuropsych testing. Marriage counseling is advised and psychological services may be in order as well. I think the patient and her  feel somewhat relieved and making progress and moving on. Good luck on all scores. Plan:   Referral back to psychiatry and psychology is recommended.   Signed by :  Jaqui Hodge MD

## 2022-03-18 PROBLEM — R41.3 MEMORY CHANGES: Status: ACTIVE | Noted: 2019-02-15

## 2022-03-18 PROBLEM — E05.90 SUBCLINICAL HYPERTHYROIDISM: Status: ACTIVE | Noted: 2017-01-11

## 2022-03-18 PROBLEM — E04.2 MULTINODULAR GOITER: Status: ACTIVE | Noted: 2017-03-18

## 2022-03-19 PROBLEM — E04.1 AUTONOMOUS THYROID NODULE: Status: ACTIVE | Noted: 2017-03-18

## 2022-10-11 ENCOUNTER — OFFICE VISIT (OUTPATIENT)
Dept: NEUROLOGY | Age: 57
End: 2022-10-11
Payer: OTHER GOVERNMENT

## 2022-10-11 VITALS
HEIGHT: 64 IN | DIASTOLIC BLOOD PRESSURE: 70 MMHG | HEART RATE: 100 BPM | SYSTOLIC BLOOD PRESSURE: 112 MMHG | RESPIRATION RATE: 14 BRPM | BODY MASS INDEX: 24.65 KG/M2 | OXYGEN SATURATION: 97 %

## 2022-10-11 DIAGNOSIS — R41.0 CONFUSION: Primary | ICD-10-CM

## 2022-10-11 DIAGNOSIS — R41.3 MEMORY LOSS: ICD-10-CM

## 2022-10-11 PROCEDURE — 99244 OFF/OP CNSLTJ NEW/EST MOD 40: CPT | Performed by: PSYCHIATRY & NEUROLOGY

## 2022-10-11 RX ORDER — LUMATEPERONE 42 MG/1
CAPSULE ORAL
COMMUNITY
Start: 2022-09-28

## 2022-10-11 RX ORDER — IBANDRONATE SODIUM 150 MG/1
TABLET, FILM COATED ORAL
COMMUNITY
Start: 2022-08-31

## 2022-10-11 NOTE — PATIENT INSTRUCTIONS
Neurocognitive consult/testing procedure:    Please contact office after scheduling with one of the facilities listed below with whom you are seeing, date and time of appt and we will fax orders and notes tho that facility and schedule your follow up with our office after testing. [facility] at [facility]  Dr. Marie Macias  [address] [facility]  E) [address] Neurology at [facility]  Dr. Cele Kaminski   [address] [place] [address]  [address] [address]  (T) [phone]    [facility] [email or web address]  Dr. Jerrell Read  [address] [place] [address]  (V) [phone]  (B) [phone]    [email or web address]  Dr. Chetna Ordaz  (B) [phone]  (K) [phone]    [facility]  [address]  (Z) [phone]  (B) [phone]    [facility] ([email or web address]  Dr. Aimee Jensen  [address] [facility] [address] [facility]  Office: [phone]   Fax: [phone]    [facility]   Dr. Jack Moffett  [address] [facility]  (B) [phone]  (G) [phone]    [facility]. Dr. Kelly Mendez  [address] [place] [address]  (C) [phone]  (B) [phone]    RESULT POLICY      If we have ordered testing for you, know that; [facility] NEWS IS GOOD NEWS! \"     It is our policy that we no longer call patients with results, nor do we  give test results over the phone. We schedule follow up appointments so that your results can be discussed in person. This allows you to address any questions you have regarding the results.       If you choose to go to an imaging center outside of [employer], it is your responsibility to bring imaging report and disc to follow up appointment. If something of concern is revealed on your test, we will contact you to discuss the matter and if needed schedule a sooner follow up appointment. Additionally, results may be found by using the My Chart feature and one of our patient service representatives at the  can give you instructions on how to access this feature to utilize our electronic medical record system. Thank you for your understanding. [facility]   Statement to Patients  April 1, 2014      In an effort to ensure the large volume of patient prescription refills is processed in the most efficient and expeditious manner, we are asking our patients to assist us by calling your Pharmacy for all prescription refills, this will include also your  Mail Order Pharmacy. The pharmacy will contact our office electronically to continue the refill process. Please do not wait until the last minute to call your pharmacy. We need at least 48 hours (2days) to fill prescriptions. We also encourage you to call your pharmacy before going to  your prescription to make sure it is ready. With regard to controlled substance prescription refill requests (narcotic refills) that need to be picked up at our office, we ask your cooperation by providing us with at least 72 hours (3days) notice that you will need a refill. We will not refill narcotic prescription refill requests after 4:00pm on any weekday, Monday through Thursday, or after 2:00pm on Fridays, or on the weekends. We encourage everyone to explore another way of getting your prescription refill request processed using [employer], our patient web portal through our electronic medical record system. [employer] is an efficient and effective way to communicate your medication request directly to the office and  downloadable as an rich on your smart phone . [employer] also features a review functionality that allows you to view your medication list as well as leave messages for your physician. Are you ready to get connected? If so please review the attatched instructions or speak to any of our staff to get you set up right away! Thank you so much for your cooperation. Should you have any questions please contact our Practice Administrator.

## 2022-10-11 NOTE — PROGRESS NOTES
[facility] and [address] at [facility] at [address] [facility], [address] [place] [address]  [facility], [address] [place] [address]  [phone] Office  [phone] Facsimile           Referring: Abbe Garcia MD      Chief Complaint   Patient presents with    New Patient     Patient presents today for c/o short term memory loss, forgetful, word finding. Patient is accompanied by her  and states he would like imaging done again, patient had imaging back in 2019 and saw Dr Nick Guardado. 51-year-old lady presents today company by her  for neurologic consultation regarding progressive cognitive difficulty. They recount that about 3 years ago she was having short-term memory difficulty. They saw Dr. Ramesh Randhawa. Evaluation was unremarkable. They were under the impression that she had some atrophy of the brain out of proportion to aging. In any regard she has continued to be treated for her underlying psychopathology. She has had progressive cognitive difficulty although at times it waxes and wanes with relapses etc.  She has had difficulty remembering how to get to places. She lost her speaking ability in 1994 rather her speaking ability her ability to communicate effectively seem to diminish and that has gotten worse. Her  notes that she is not able to converse properly. She does not recall events. No focal deficits. Record review finds patient was seen by Dr. Pita Barnett February 15, 2019. She was having memory difficulty. She had neuropsychological evaluation demonstrating emotional distress and chronic attention deficit as well as suggestions of posttraumatic stress disorder/somatization and schizoaffective disorder. .  EEG normal  MRI normal dated May 31, 2018 demonstrating only mild age-related changes    Neuropsychological evaluation January 29, 2019 with the formal impression of normal range and neuropsychological evaluation. She was noted to have functional interference and was apparently reporting paranoia anxiety as well as depression and somatization. It was felt that her day-to-day reported memory problems but not clinically corroborated were secondary to emotional distress and chronic attention deficit. There was no dementia and she was recommended to continue to follow for psychiatric care  Past Medical History:   Diagnosis Date    Autonomous thyroid nodule 3/18/2017    Depression     Memory change     Multinodular goiter 3/18/2017    Musculoskeletal disorder     Snoring     Subclinical hyperthyroidism 1/11/2017       Past Surgical History:   Procedure Laterality Date    HX HYSTERECTOMY      HX TUBAL LIGATION         Current Outpatient Medications   Medication Sig Dispense Refill    Caplyta 42 mg capsule TAKE ONE CAPSULE BY MOUTH ONE TIME DAILY AFTER DINNER      ibandronate (BONIVA) 150 mg tablet TAKE 1 TABLET ONCE A MONTH ( SAME DATE) WITH A FULL GLASS OFWATER AND REMAIN UPRIGHT POSITION AT LEAST 1 HOUR      cholecalciferol, vitamin D3, 2,000 unit tab Take  by mouth.      b complex vitamins tablet Take 1 Tab by mouth daily. conjugated estrogens (PREMARIN) 0.625 mg/gram vaginal cream Apply 0.5 g to affected area as needed. glucosamine-chondroitin (ARTHX) 500-400 mg cap Take 1 Cap by mouth daily. vitamin e (E GEMS) 100 unit capsule Take  by mouth daily. ARIPiprazole (ABILIFY) 5 mg tablet TK 2 TS PO QAM AFTER BREAKFAST  0    TURMERIC ROOT EXTRACT PO Take  by mouth.      ginkgo biloba (GINKOBA PO) Take  by mouth two (2) times a day. multivitamin (ONE A DAY) tablet Take 1 Tab by mouth daily. Iuidv2-RiyZ0-U26-E-FA-Fish Oil 467--018 mg-mg-mcg-mcg cap Take  by mouth. No Known Allergies    Social History     Tobacco Use    Smoking status: Never    Smokeless tobacco: Never   Substance Use Topics    Alcohol use: Yes    Drug use:  No Family History   Problem Relation Age of Onset    Hypertension Father     Dementia Father     Stroke Father        Review of Systems  Pertinent positives and negatives as noted. Examination  Visit Vitals  /70   Pulse 100   Resp 14   Ht 5' 4\" (1.626 m)   SpO2 97%   BMI 24.65 kg/m²     She is a pleasant appropriately dressed lady. Flat affect. Oriented to October 10, 2022 and we are on the second floor. Registration 3/3 recall 2/3 with clues 3/3. She calculates coins properly. She knows were in [place]. Cranial nerves intact 2-12. No nystagmus. No pronation and no drift. Strength is full in the upper and lower extremities in all muscle groups today testing. No abnormal movement. Reflexes symmetrical throughout. No pathologic reflex. No ataxia    Impression/Plan  59-year-old lady with progressive cognitive decline, difficulty with language, confusion and again progressive over the last 3 years since her last evaluation  MRI of the brain  EEG  Carotid Doppler  Formal neuropsychological evaluation and compare that to previous to see if there has been an objective decline i.e. an organic process versus secondary to her underlying psychiatric illnesses versus other    Follow-up after testing      Laurie Yang MD          This note was created using voice recognition software. Despite editing, there may be syntax errors.

## 2022-10-11 NOTE — PROGRESS NOTES
Chief Complaint   Patient presents with    New Patient     Patient presents today for c/o short term memory loss, forgetful, word finding. Patient is accompanied by her  and states he would like imaging done again, patient had imaging back in 2019 and saw Dr Jeffrey Jones.

## 2022-10-12 ENCOUNTER — TELEPHONE (OUTPATIENT)
Dept: NEUROLOGY | Age: 57
End: 2022-10-12

## 2022-10-12 NOTE — TELEPHONE ENCOUNTER
Patient's  stated he was supposed to call to let Lisa Bojorquez know when patient's testing is scheduled. MRI is scheduled at [facility] 11/8 @ 10    Cognitive testing w/ Dr. Jonny Dominguez 1/20/2023 @ 9:00    He is requesting notes and referral be faxed to Dr EISENBERG [facility] office. He did not have the fax number.

## 2022-12-05 ENCOUNTER — HOSPITAL ENCOUNTER (EMERGENCY)
Age: 57
Discharge: PSYCHIATRIC OR SUBSTANCE USE FACILITY | End: 2022-12-06
Payer: OTHER GOVERNMENT

## 2022-12-05 DIAGNOSIS — F25.9 SCHIZO-AFFECTIVE SCHIZOPHRENIA (HCC): ICD-10-CM

## 2022-12-05 DIAGNOSIS — Z91.14 NON COMPLIANCE W MEDICATION REGIMEN: Primary | ICD-10-CM

## 2022-12-05 PROCEDURE — 74011250636 HC RX REV CODE- 250/636: Performed by: NURSE PRACTITIONER

## 2022-12-05 PROCEDURE — 99285 EMERGENCY DEPT VISIT HI MDM: CPT

## 2022-12-05 PROCEDURE — 96372 THER/PROPH/DIAG INJ SC/IM: CPT

## 2022-12-05 RX ORDER — ZIPRASIDONE MESYLATE 20 MG/ML
20 INJECTION, POWDER, LYOPHILIZED, FOR SOLUTION INTRAMUSCULAR
Status: COMPLETED | OUTPATIENT
Start: 2022-12-05 | End: 2022-12-05

## 2022-12-05 RX ADMIN — ZIPRASIDONE MESYLATE 20 MG: 20 INJECTION, POWDER, LYOPHILIZED, FOR SOLUTION INTRAMUSCULAR at 23:48

## 2022-12-06 ENCOUNTER — HOSPITAL ENCOUNTER (INPATIENT)
Age: 57
LOS: 17 days | Discharge: HOME | DRG: 885 | End: 2022-12-23
Attending: PSYCHIATRY & NEUROLOGY | Admitting: PSYCHIATRY & NEUROLOGY
Payer: OTHER GOVERNMENT

## 2022-12-06 VITALS
TEMPERATURE: 98.1 F | HEART RATE: 85 BPM | BODY MASS INDEX: 20.49 KG/M2 | WEIGHT: 120 LBS | SYSTOLIC BLOOD PRESSURE: 137 MMHG | DIASTOLIC BLOOD PRESSURE: 75 MMHG | RESPIRATION RATE: 16 BRPM | HEIGHT: 64 IN | OXYGEN SATURATION: 100 %

## 2022-12-06 DIAGNOSIS — F25.0 SCHIZOAFFECTIVE DISORDER, BIPOLAR TYPE (HCC): Primary | ICD-10-CM

## 2022-12-06 PROBLEM — F20.9 SCHIZOPHRENIA (HCC): Status: ACTIVE | Noted: 2022-12-06

## 2022-12-06 LAB
ALBUMIN SERPL-MCNC: 3.6 G/DL (ref 3.5–5)
ALBUMIN/GLOB SERPL: 1.2 {RATIO} (ref 1.1–2.2)
ALP SERPL-CCNC: 57 U/L (ref 45–117)
ALT SERPL-CCNC: 27 U/L (ref 12–78)
AMPHET UR QL SCN: NEGATIVE
ANION GAP SERPL CALC-SCNC: 11 MMOL/L (ref 5–15)
APPEARANCE UR: CLEAR
AST SERPL W P-5'-P-CCNC: 21 U/L (ref 15–37)
BACTERIA URNS QL MICRO: NEGATIVE /HPF
BARBITURATES UR QL SCN: NEGATIVE
BASOPHILS # BLD: 0 K/UL (ref 0–0.1)
BASOPHILS NFR BLD: 0 % (ref 0–1)
BENZODIAZ UR QL: NEGATIVE
BILIRUB SERPL-MCNC: 0.4 MG/DL (ref 0.2–1)
BILIRUB UR QL: NEGATIVE
BUN SERPL-MCNC: 17 MG/DL (ref 6–20)
BUN/CREAT SERPL: 23 (ref 12–20)
CA-I BLD-MCNC: 9 MG/DL (ref 8.5–10.1)
CANNABINOIDS UR QL SCN: NEGATIVE
CHLORIDE SERPL-SCNC: 111 MMOL/L (ref 97–108)
CO2 SERPL-SCNC: 21 MMOL/L (ref 21–32)
COCAINE UR QL SCN: NEGATIVE
COLOR UR: ABNORMAL
CREAT SERPL-MCNC: 0.73 MG/DL (ref 0.55–1.02)
DIFFERENTIAL METHOD BLD: ABNORMAL
DRUG SCRN COMMENT,DRGCM: NORMAL
EOSINOPHIL # BLD: 0 K/UL (ref 0–0.4)
EOSINOPHIL NFR BLD: 0 % (ref 0–7)
ERYTHROCYTE [DISTWIDTH] IN BLOOD BY AUTOMATED COUNT: 13.5 % (ref 11.5–14.5)
ETHANOL SERPL-MCNC: <10 MG/DL
FLUAV RNA SPEC QL NAA+PROBE: NOT DETECTED
FLUBV RNA SPEC QL NAA+PROBE: NOT DETECTED
GLOBULIN SER CALC-MCNC: 3.1 G/DL (ref 2–4)
GLUCOSE SERPL-MCNC: 116 MG/DL (ref 65–100)
GLUCOSE UR STRIP.AUTO-MCNC: NEGATIVE MG/DL
HCT VFR BLD AUTO: 35.5 % (ref 35–47)
HGB BLD-MCNC: 12.1 G/DL (ref 11.5–16)
HGB UR QL STRIP: NEGATIVE
HYALINE CASTS URNS QL MICRO: ABNORMAL /LPF (ref 0–5)
IMM GRANULOCYTES # BLD AUTO: 0 K/UL (ref 0–0.04)
IMM GRANULOCYTES NFR BLD AUTO: 0 % (ref 0–0.5)
KETONES UR QL STRIP.AUTO: NEGATIVE MG/DL
LEUKOCYTE ESTERASE UR QL STRIP.AUTO: NEGATIVE
LYMPHOCYTES # BLD: 0.6 K/UL (ref 0.8–3.5)
LYMPHOCYTES NFR BLD: 11 % (ref 12–49)
MCH RBC QN AUTO: 31.1 PG (ref 26–34)
MCHC RBC AUTO-ENTMCNC: 34.1 G/DL (ref 30–36.5)
MCV RBC AUTO: 91.3 FL (ref 80–99)
METHADONE UR QL: NEGATIVE
MONOCYTES # BLD: 0.3 K/UL (ref 0–1)
MONOCYTES NFR BLD: 5 % (ref 5–13)
MUCOUS THREADS URNS QL MICRO: ABNORMAL /LPF
NEUTS SEG # BLD: 4.9 K/UL (ref 1.8–8)
NEUTS SEG NFR BLD: 84 % (ref 32–75)
NITRITE UR QL STRIP.AUTO: NEGATIVE
NRBC # BLD: 0 K/UL (ref 0–0.01)
NRBC BLD-RTO: 0 PER 100 WBC
OPIATES UR QL: NEGATIVE
PCP UR QL: NEGATIVE
PH UR STRIP: 5 [PH]
PLATELET # BLD AUTO: 206 K/UL (ref 150–400)
PMV BLD AUTO: 9.6 FL (ref 8.9–12.9)
POTASSIUM SERPL-SCNC: 3.7 MMOL/L (ref 3.5–5.1)
PROT SERPL-MCNC: 6.7 G/DL (ref 6.4–8.2)
PROT UR STRIP-MCNC: NEGATIVE MG/DL
RBC # BLD AUTO: 3.89 M/UL (ref 3.8–5.2)
RBC #/AREA URNS HPF: ABNORMAL /HPF (ref 0–5)
SARS-COV-2, COV2: NOT DETECTED
SODIUM SERPL-SCNC: 143 MMOL/L (ref 136–145)
SP GR UR REFRACTOMETRY: 1.02 (ref 1–1.03)
UROBILINOGEN UR QL STRIP.AUTO: 0.1 EU/DL (ref 0.2–1)
WBC # BLD AUTO: 5.8 K/UL (ref 3.6–11)
WBC URNS QL MICRO: ABNORMAL /HPF (ref 0–4)

## 2022-12-06 PROCEDURE — 74011250637 HC RX REV CODE- 250/637: Performed by: PSYCHIATRY & NEUROLOGY

## 2022-12-06 PROCEDURE — 81001 URINALYSIS AUTO W/SCOPE: CPT

## 2022-12-06 PROCEDURE — 65220000003 HC RM SEMIPRIVATE PSYCH

## 2022-12-06 PROCEDURE — 36415 COLL VENOUS BLD VENIPUNCTURE: CPT

## 2022-12-06 PROCEDURE — 80307 DRUG TEST PRSMV CHEM ANLYZR: CPT

## 2022-12-06 PROCEDURE — 80053 COMPREHEN METABOLIC PANEL: CPT

## 2022-12-06 PROCEDURE — 82077 ASSAY SPEC XCP UR&BREATH IA: CPT

## 2022-12-06 PROCEDURE — 85025 COMPLETE CBC W/AUTO DIFF WBC: CPT

## 2022-12-06 PROCEDURE — 87636 SARSCOV2 & INF A&B AMP PRB: CPT

## 2022-12-06 RX ORDER — ARIPIPRAZOLE 5 MG/1
10 TABLET ORAL DAILY
Status: DISCONTINUED | OUTPATIENT
Start: 2022-12-06 | End: 2022-12-07

## 2022-12-06 RX ORDER — ADHESIVE BANDAGE
30 BANDAGE TOPICAL DAILY PRN
Status: DISCONTINUED | OUTPATIENT
Start: 2022-12-06 | End: 2022-12-23 | Stop reason: HOSPADM

## 2022-12-06 RX ORDER — HYDROXYZINE PAMOATE 50 MG/1
50 CAPSULE ORAL
Status: DISCONTINUED | OUTPATIENT
Start: 2022-12-06 | End: 2022-12-23 | Stop reason: HOSPADM

## 2022-12-06 RX ORDER — LORAZEPAM 2 MG/ML
2 INJECTION INTRAMUSCULAR
Status: DISCONTINUED | OUTPATIENT
Start: 2022-12-06 | End: 2022-12-06 | Stop reason: HOSPADM

## 2022-12-06 RX ORDER — ACETAMINOPHEN 325 MG/1
650 TABLET ORAL
Status: DISCONTINUED | OUTPATIENT
Start: 2022-12-06 | End: 2022-12-23 | Stop reason: HOSPADM

## 2022-12-06 RX ORDER — TRAZODONE HYDROCHLORIDE 50 MG/1
50 TABLET ORAL
Status: DISCONTINUED | OUTPATIENT
Start: 2022-12-06 | End: 2022-12-23 | Stop reason: HOSPADM

## 2022-12-06 RX ORDER — MAG HYDROX/ALUMINUM HYD/SIMETH 200-200-20
30 SUSPENSION, ORAL (FINAL DOSE FORM) ORAL
Status: DISCONTINUED | OUTPATIENT
Start: 2022-12-06 | End: 2022-12-23 | Stop reason: HOSPADM

## 2022-12-06 RX ORDER — IBUPROFEN 400 MG/1
400 TABLET ORAL
Status: DISCONTINUED | OUTPATIENT
Start: 2022-12-06 | End: 2022-12-23 | Stop reason: HOSPADM

## 2022-12-06 RX ADMIN — ARIPIPRAZOLE 10 MG: 5 TABLET ORAL at 15:28

## 2022-12-06 NOTE — BH NOTES
Admission Note:     Patient arrived on the unit @ 94 31 11 from [facility] ED on a TDO admission. Patient was escorted on the unit  by [facility] and [place] Officers x 2. Dr. Leilani Herzog and Nursing Supervisor were notified of patient's arrival. Consult placed for Medical H&P for Hospitalist,Dr. Adam Schneider. Patient Alert and oriented x 4. Calm and cooperative with guarded behavior. Denies SI/HI/AVH but very distracted during admission process. Patient paranoid and delusional.   Patient is hyper-Rastafarian. Patient denied having a license with [employer]. No agitation or aggression noted. Patient compliant with medications.

## 2022-12-06 NOTE — ED NOTES
Constant Observer Yes : Joaquin   Constant Observer Oriented YES   High risk patients are in line of sight at all times Yes   Excess equipment/medical supplies not necessary for the care of the patient removed Yes   All sharp or dangerous objects are removed from room: including but not limited to belts, pens & pencils, needles, medications, cosmetics, lighters, matches, nail files, watches, necklaces, glass objects, razors, razor blades, knives, aerosol sprays, drawstring pants, shoes, cords (telephone, call bells, etc.) cleaning wipes or other cleaning items, aluminum cans, not permanently attached wall décor Yes   Telephone/cell phone removed as well as TV remote (batteries can be swallowed) Yes   Patient belongings removed and labeled at nurses station Yes   Excess linen is removed from room Yes   All plastic bags are removed from the room and replaced with paper trash bags Yes   Patient is in paper scrubs or appropriate gown and using hospital socks with rubber soles Yes   No metal, hard eating utensils or hard plates are on meal tray Yes   Remove all cleaning agents used by [employer] Yes   If Crucifix is hanging on a nail, remove Crucifix as well as the nail Yes       *If any question above is answered \"No,\" documentation is required.

## 2022-12-06 NOTE — BH NOTES
TRANSFER - IN REPORT:    Verbal report received from Tho Cheung RN (name) on Uriel Lombardi  being received from [facility] ED (unit) for routine progression of care      Report consisted of patients Situation, Background, Assessment and   Recommendations(SBAR). Information from the following report(s) SBAR, Kardex, ED Summary, MAR, Recent Results, and Med Rec Status was reviewed with the receiving nurse. Lines:    Opportunity for questions and clarification was provided. Assessment completed upon patients arrival to unit and care assumed. Patient escorted on the unit at 94 31 11 on a TDO by DR JESSICA DANGELO [facility] x 2 and [employer].

## 2022-12-06 NOTE — BSMART NOTE
Per Eunice Barahona with [facility], pt accepted by Dr Lanier Postal to [facility] Room 232/2    Number for nurse report [phone]    Primary nurse, [facility], aware    [facility] will fax over alternative facility form for officer

## 2022-12-06 NOTE — BSMART NOTE
[facility] from [facility] 19 attempted to assess patient via ipad with computer interpretor at bedside if needed as patient speaks both [place] and [place]. Patient did not verbally communicate at all during assessment. Therefore, [facility] contacted patient's  to gather collateral information. [employer] remains at bedside.

## 2022-12-06 NOTE — ED NOTES
Pt served with a TDO by Anuj CHÁVEZ at this time, called writer and wanted [facility] nurse to clarify to her why she is going to [facility]

## 2022-12-06 NOTE — ED PROVIDER NOTES
EMERGENCY DEPARTMENT HISTORY AND PHYSICAL EXAM      Date: 12/5/2022  Patient Name: Behzad Hernandez    History of Presenting Illness     Chief Complaint   Patient presents with    Mental Health Problem       History Provided By: Patient    HPI: Behzad Hernandez, 62 y.o. female past medical history of schizoaffective disorder, memory loss presents to the ER under an E CO. patient has been acting bizarrely all day. Patient has refused to take her psych medicines for the last 2 days.  took out an ECO on the patient due to her bizarre behavior. Moderate severity, no known exacerbating or relieving factors, no other associated signs and symptoms     There are no other complaints, changes, or physical findings at this time. Past History     Past Medical History:  Past Medical History:   Diagnosis Date    Autonomous thyroid nodule 3/18/2017    Depression     Memory change     Multinodular goiter 3/18/2017    Musculoskeletal disorder     Snoring     Subclinical hyperthyroidism 1/11/2017       Past Surgical History:  Past Surgical History:   Procedure Laterality Date    HX HYSTERECTOMY      HX TUBAL LIGATION         Family History:  Family History   Problem Relation Age of Onset    Hypertension Father     Dementia Father     Stroke Father        Social History:  Social History     Tobacco Use    Smoking status: Never    Smokeless tobacco: Never   Substance Use Topics    Alcohol use: Yes    Drug use: No       Allergies:  No Known Allergies    PCP: Edward Singletary MD    No current facility-administered medications on file prior to encounter.      Current Outpatient Medications on File Prior to Encounter   Medication Sig Dispense Refill    Caplyta 42 mg capsule TAKE ONE CAPSULE BY MOUTH ONE TIME DAILY AFTER DINNER      ibandronate (BONIVA) 150 mg tablet TAKE 1 TABLET ONCE A MONTH ( SAME DATE) WITH A FULL GLASS OFWATER AND REMAIN UPRIGHT POSITION AT LEAST 1 HOUR      conjugated estrogens (PREMARIN) 0.625 mg/gram vaginal cream Apply 0.5 g to affected area as needed. glucosamine-chondroitin (ARTHX) 500-400 mg cap Take 1 Cap by mouth daily. vitamin e (E GEMS) 100 unit capsule Take  by mouth daily. ARIPiprazole (ABILIFY) 5 mg tablet TK 2 TS PO QAM AFTER BREAKFAST  0    TURMERIC ROOT EXTRACT PO Take  by mouth.      ginkgo biloba (GINKOBA PO) Take  by mouth two (2) times a day. multivitamin (ONE A DAY) tablet Take 1 Tab by mouth daily. Nnokh9-[id number]-E-FA-Fish Oil 661--823 mg-mg-mcg-mcg cap Take  by mouth. Review of Systems   Review of Systems   Constitutional:  Negative for chills, fatigue and fever. HENT:  Negative for congestion, sinus pressure and trouble swallowing. Eyes:  Negative for photophobia and pain. Respiratory:  Negative for cough and shortness of breath. Cardiovascular:  Negative for chest pain and leg swelling. Gastrointestinal:  Negative for abdominal pain, diarrhea, nausea and vomiting. Endocrine: Negative for polydipsia, polyphagia and polyuria. Genitourinary:  Negative for decreased urine volume, difficulty urinating, dysuria, hematuria and urgency. Musculoskeletal:  Negative for back pain, gait problem, myalgias and neck pain. Skin:  Negative for pallor and rash. Allergic/Immunologic: Negative for environmental allergies and food allergies. Neurological:  Negative for dizziness, facial asymmetry, speech difficulty, numbness and headaches. Hematological:  Negative for adenopathy. Does not bruise/bleed easily. Psychiatric/Behavioral:  Positive for behavioral problems and dysphoric mood. Negative for agitation, self-injury and suicidal ideas. The patient is not nervous/anxious. Physical Exam   Physical Exam  Vitals and nursing note reviewed. Constitutional:       Appearance: Normal appearance. HENT:      Head: Atraumatic.       Right Ear: Tympanic membrane and external ear normal.      Left Ear: Tympanic membrane and external ear normal.      Nose: Nose normal.      Mouth/Throat:      Mouth: Mucous membranes are moist.   Eyes:      Extraocular Movements: Extraocular movements intact. Pupils: Pupils are equal, round, and reactive to light. Cardiovascular:      Rate and Rhythm: Normal rate and regular rhythm. Pulses: Normal pulses. Heart sounds: Normal heart sounds. Pulmonary:      Breath sounds: Normal breath sounds. Abdominal:      General: Abdomen is flat. Palpations: Abdomen is soft. Musculoskeletal:         General: Normal range of motion. Cervical back: Normal range of motion and neck supple. Skin:     General: Skin is warm and dry. Capillary Refill: Capillary refill takes less than 2 seconds. Neurological:      General: No focal deficit present. Mental Status: She is alert and oriented to person, place, and time. Mental status is at baseline. Psychiatric:         Mood and Affect: Mood normal.         Speech: Speech is delayed. Behavior: Behavior is withdrawn. Thought Content: Thought content is delusional.         Judgment: Judgment is impulsive. Lab and Diagnostic Study Results   Labs -     Recent Results (from the past 12 hour(s))   CBC WITH AUTOMATED DIFF    Collection Time: 12/06/22 12:51 AM   Result Value Ref Range    WBC 5.8 3.6 - 11.0 K/uL    RBC 3.89 3.80 - 5.20 M/uL    HGB 12.1 11.5 - 16.0 g/dL    HCT 35.5 35.0 - 47.0 %    MCV 91.3 80.0 - 99.0 FL    MCH 31.1 26.0 - 34.0 PG    MCHC 34.1 30.0 - 36.5 g/dL    RDW 13.5 11.5 - 14.5 %    PLATELET 643 717 - 430 K/uL    MPV 9.6 8.9 - 12.9 FL    NRBC 0.0 0.0  WBC    ABSOLUTE NRBC 0.00 0.00 - 0.01 K/uL    NEUTROPHILS 84 (H) 32 - 75 %    LYMPHOCYTES 11 (L) 12 - 49 %    MONOCYTES 5 5 - 13 %    EOSINOPHILS 0 0 - 7 %    BASOPHILS 0 0 - 1 %    IMMATURE GRANULOCYTES 0 0 - 0.5 %    ABS. NEUTROPHILS 4.9 1.8 - 8.0 K/UL    ABS. LYMPHOCYTES 0.6 (L) 0.8 - 3.5 K/UL    ABS. MONOCYTES 0.3 0.0 - 1.0 K/UL    ABS.  EOSINOPHILS 0.0 0.0 - 0.4 K/UL ABS. BASOPHILS 0.0 0.0 - 0.1 K/UL    ABS. IMM. GRANS. 0.0 0.00 - 0.04 K/UL    DF AUTOMATED     URINALYSIS W/MICROSCOPIC    Collection Time: 12/06/22 12:51 AM   Result Value Ref Range    Color Yellow/Straw      Appearance Clear Clear      Specific gravity 1.020 1.003 - 1.030      pH (UA) 5.0      Protein Negative Negative mg/dL    Glucose Negative Negative mg/dL    Ketone Negative Negative mg/dL    Bilirubin Negative Negative      Blood Negative Negative      Urobilinogen 0.1 (L) 0.2 - 1.0 EU/dL    Nitrites Negative Negative      Leukocyte Esterase Negative Negative      WBC 0-4 0 - 4 /hpf    RBC 0-5 0 - 5 /hpf    Bacteria Negative Negative /hpf    Mucus 2+ (A) Negative /lpf    Hyaline cast 2-5 0 - 5 /lpf       Radiologic Studies -   @lastxrresult@  CT Results  (Last 48 hours)      None          CXR Results  (Last 48 hours)      None            Medical Decision Making and ED Course   Differential Diagnosis & Medical Decision Making Provider Note:   Patient presents with a mental health problem. Differential diagnosis include bipolar, schizophrenia, noncompliance, suicidal ideation, homicidal ideation, patient seen by behavioral health. Patient will be a TDO to accepting behavioral health facility. Is medically clear. - I am the first provider for this patient. I reviewed the vital signs, available nursing notes, past medical history, past surgical history, family history and social history. The patients presenting problems have been discussed, and they are in agreement with the care plan formulated and outlined with them. I have encouraged them to ask questions as they arise throughout their visit. Vital Signs-Reviewed the patient's vital signs. Patient Vitals for the past 12 hrs:   Temp Pulse Resp BP   12/05/22 2234 98.6 °F (37 °C) 70 20 (!) 145/84       ED Course:          Procedures   Performed by: Castillo Jain NP  Procedures      Disposition   Disposition: Condition stable    DISCHARGE PLAN:  1. Current Discharge Medication List        CONTINUE these medications which have NOT CHANGED    Details   Caplyta 42 mg capsule TAKE ONE CAPSULE BY MOUTH ONE TIME DAILY AFTER DINNER      ibandronate (BONIVA) 150 mg tablet TAKE 1 TABLET ONCE A MONTH ( SAME DATE) WITH A FULL GLASS OFWATER AND REMAIN UPRIGHT POSITION AT LEAST 1 HOUR      conjugated estrogens (PREMARIN) 0.625 mg/gram vaginal cream Apply 0.5 g to affected area as needed. glucosamine-chondroitin (ARTHX) 500-400 mg cap Take 1 Cap by mouth daily. vitamin e (E GEMS) 100 unit capsule Take  by mouth daily. ARIPiprazole (ABILIFY) 5 mg tablet TK 2 TS PO QAM AFTER BREAKFAST  Refills: 0      TURMERIC ROOT EXTRACT PO Take  by mouth.      ginkgo biloba (GINKOBA PO) Take  by mouth two (2) times a day. multivitamin (ONE A DAY) tablet Take 1 Tab by mouth daily. Associated Diagnoses: Subclinical hyperthyroidism; Autonomous thyroid nodule; Multinodular goiter      Lxakl9-[id number]-E-FA-Fish Oil 148--630 mg-mg-mcg-mcg cap Take  by mouth. Associated Diagnoses: Subclinical hyperthyroidism; Autonomous thyroid nodule; Multinodular goiter           2. Follow-up Information    None       3. Return to ED if worse   4. Current Discharge Medication List            Diagnosis/Clinical Impression     Clinical Impression:   1. Non compliance w medication regimen    2. [facility] ([facility]        Attestations: Jacquelin MENDOZA NP, am the primary clinician of record. Please note that this dictation was completed with Swipely, the computer voice recognition software. Quite often unanticipated grammatical, syntax, homophones, and other interpretive errors are inadvertently transcribed by the computer software. Please disregard these errors. Please excuse any errors that have escaped final proofreading. Thank you.

## 2022-12-06 NOTE — ED NOTES
TRANSFER - OUT REPORT:    Verbal report given to marciano  on [facility]  being transferred to [facility] for routine progression of care       Report consisted of patients Situation, Background, Assessment and   Recommendations(SBAR). Information from the following report(s) SBAR was reviewed with the receiving nurse. Lines:       Opportunity for questions and clarification was provided. Patient transported with:  .

## 2022-12-06 NOTE — BH NOTES
[facility]  Master Treatment Plan for Heath James    Date Treatment Plan Initiated: 12/06/2022    Treatment Plan Modalities:  Type of Modality Amount  (x minutes) Frequency (x/week) Duration (x days) Name of Responsible Staff   [address] [place] [address] meetings to encourage peer interactions 30 14 1 Devonte Licea., [facility]   Group psychotherapy to assist in building coping skills and internal controls 60 5 1 Bal[facility] Bowen., [facility]  Lisa Navarro., [facility]   Therapeutic activity groups to build coping skills 60 7 1 Wilfred Lopez., [facility]     Psychoeducation in group setting to address:   Medication education  Coping Skills  Symptom Management   60 7 1 Baljit Bowen., [facility]  Lisa Landon, [facility]  Kellie Gonsales., RN  Shante Bridges RN   Discharge planning   60 2 1 Иван Lopez.,    Spirituality    60 2 1 Anabelle Mccollum.   Physician medication management   15 7 1 GABINO Lou MD                                         Treatment Team Signatures    I have participated in the development of this plan of treatment and agree to its implementation.     Patient Signature       Patient Printed Name Date/Time   Social Work/Therapist Signature       Social Work/Therapist Printed Name Date/Time   RN Signature       RN Printed Name    Luz Maria Becerra RN Date/Time    12/06/2022  1359   Other Signature     Other Signature Date/Time   MD Signature       MD Printed Name Date/Time

## 2022-12-06 NOTE — ED NOTES
Received care of patient, currently calm and cooperative. Placed in green gown.  hold. Bed search at this time. Would like to speak to intake about possible going home. Room psych proofed. Cco at bedside.

## 2022-12-06 NOTE — ED TRIAGE NOTES
Pt arrives with [facility] pd  reports hx of schizophrenia pt has been refusing meds  states pt has been having flights of ideas, pt will not answer questions

## 2022-12-06 NOTE — ED NOTES
Pt declined to put on hospital gown, stated to writer, \" I am not sick to put on hospital gown. I didn't take my medicine for 2 days that is why I came here but now am ok.  \"

## 2022-12-06 NOTE — BSMART NOTE
Patient's nurse asked this writer to speak with patient after TDO was served by [facility]. Patient coherent, logical, and appears appropriate. When asked if patient knew what had happened last night and why she was brought to the hospital, patient was unsure. Explained to patient that [facility] was called as her  was worried about her abnormal behavior. Patient states that she was \"just serving the [facility]. \" She did state that she is followed by Dr. Thea Hogan and believes she last saw him about 3 weeks ago. Patient states that she was taking Abilify daily and another psychiatric medication that she did not know the name of. She states that she had been compliant with the medications until 2 days ago. She states that she decided she was okay and did not need the medications. At this time, patient appears to understand that she is under a TDO and will be admitted to hospital for further treatment. 1:1 sitter and [facility] remain outside of patient's room.

## 2022-12-06 NOTE — BH NOTES
Shift change report given to Anitra Che re: SBAR, medications, and behavior.   Anila fall risk score:  1

## 2022-12-06 NOTE — PROGRESS NOTES
Problem: Depressed Mood (Adult/Pediatric)  Goal: *STG: Remains safe in hospital  Outcome: Progressing Towards Goal  Goal: *STG: Complies with medication therapy  Outcome: Progressing Towards Goal     Problem: Patient Education: Go to Patient Education Activity  Goal: Patient/Family Education  Outcome: Progressing Towards Goal

## 2022-12-07 PROBLEM — S60.211A CONTUSION OF RIGHT WRIST: Status: ACTIVE | Noted: 2022-12-07

## 2022-12-07 PROCEDURE — 90792 PSYCH DIAG EVAL W/MED SRVCS: CPT | Performed by: PSYCHIATRY & NEUROLOGY

## 2022-12-07 PROCEDURE — 65220000003 HC RM SEMIPRIVATE PSYCH

## 2022-12-07 NOTE — PROGRESS NOTES
Problem: Falls - Risk of  Goal: *Absence of Falls  Description: Document Pernell Ward Fall Risk and appropriate interventions in the flowsheet.   Outcome: Progressing Towards Goal  Note: Fall Risk Interventions:            Medication Interventions: Teach patient to arise slowly                   Problem: Depressed Mood (Adult/Pediatric)  Goal: *STG: Participates in treatment plan  Outcome: Progressing Towards Goal  Goal: *STG: Attends activities and groups  Outcome: Progressing Towards Goal  Goal: *STG: Remains safe in hospital  Outcome: Progressing Towards Goal     Problem: Paranoid Ideation  Goal: *LTG: Interact with others without defensiveness or anger  Outcome: Progressing Towards Goal

## 2022-12-07 NOTE — BH NOTES
Patient was admitted to the unit under a TDO. Her hearing will be held on Thursday December 8. Patient is able to care for her self. She lives at home with her . Patient has not been compliant with her medications. She felt that the [facility] had cleansed her. She is connected with out patient services. Dr. Kasie Neely is recommending that her meds be court ordered.

## 2022-12-07 NOTE — BH NOTES
Behavioral Health Interdisciplinary Rounds     Patient Name: Nela Median  Age: 62 y.o. Room/Bed:  232/02  Primary Diagnosis: Schizoaffective disorder, bipolar type ([facility]   Admission Status: TDO     Readmission within 30 days: no  Power of  in place: no  Patient requires a blocked bed: no          Reason for blocked bed:     VTE Prophylaxis: Not indicated    Mobility needs/Fall risk: yes  Flu Vaccine : no   Nutritional Plan: no  Consults: no         Labs/Testing due today?: no    Sleep hours: 6.5       Participation in Care/Groups:  yes  Medication Compliant?: Refusing Psychiatric Medications  PRNS (last 24 hours): None    Restraints (last 24 hours):  no     CIWA (range last 24 hours):     COWS (range last 24 hours):      Alcohol screening (AUDIT) completed -   AUDIT Score: 2     If applicable, date SBIRT discussed in treatment team AND documented:   AUDIT Screen Score: AUDIT Score: 2      Document Brief Intervention (corresponds directly with the 5 A's, Ask, Advise, Assess, Assist, and Arrange): At- Risk Patients (Score 7-15 for women; 8-15 for men)  Discuss concern patient is drinking at unhealthy levels known to increase risk of alcohol-related health problems. Is Patient ready to commit to change? If No:  Encourage reflection  Discuss short term and long term health risks of consuming alcohol  Barriers to change  Reaffirm willingness to help / Educational materials provided  If Yes:  Set goal  Plan  Educational materials provided    Harmful use or Dependence (Score 16 or greater)  Discuss short term and long term health risks of consuming alcohol  Recommendations  Negotiate drinking goal  Recommend addiction specialist/center  Arrange follow-up appointments.     Tobacco - patient is a smoker: Have You Used Tobacco in the Past 30 Days: No  Illegal Drugs use: Have You Used Any Illegal Substances Over the Past 12 Months: No    24 hour chart check complete: yes     Patient goal(s) for today: has not set a goal  Treatment team focus/goals: Interact with others without defensiveness or anger  Progress note patient will have TDO hearing tomorrow    LOS:  1  Expected LOS: 5-7    Financial concerns/prescription coverage:  no  Family contact: no      Family requesting physician contact today:  no  Discharge plan: home  Access to weapons : no        Outpatient provider(s): yes  Patient's preferred phone number for follow up call : [phone]    Participating treatment team members: Wan Parker, Pernell Martinez, Dr. Yannick Mark, (assigned SW), Wan Canales

## 2022-12-07 NOTE — BH NOTES
PSYCHOSOCIAL ASSESSMENT  :Patient identifying info: Librado Mullins is a 62 y.o., female admitted 12/6/2022  1:45 PM     Presenting problem and precipitating factors: She was admitted on a TDO from the [facility] Emergency Room after presenting there on an ECO in a very psychotic state. Her  indicated that she had stopped taking her antipsychotic medications (Abilify and possibly Caplyta) on 12/03, believing that she had been healed Susan the holy spirit. \"  It is unclear how sick or ill she was at that point, but she had decompensated quite quickly to where she had become almost catatonic and was acting very bizarrely, such as crawling on the floor, not being responsive verbally at all, staring blankly, only speaking Alexander, etc    Mental status assessment: She later escalated to becoming very agitated and even loud, but not physically aggressive or assaultive. She appeared to be internally preoccupied at times and there was occasional thought blocking noted. Most of her thoughts were tangential to the topic at hand and centered around her making Yarsani comments about the holy spirit There was no evidence of any depression and she denied feeling hopeless or having any suicidal thoughts, and there was no overt symptoms of frankie, although she may be very minimally hypomanic based on increased energy levels, some accelerated thinking, etc.  Her judgment and insight are obviously severely impaired. Her cognitive functioning shows notable deficits in areas of concentration and attention span at this point.     Strengths/Recreation/Coping Skills:has a place to return to, has support, connected with outpatient providers    Collateral information:      Current psychiatric /substance abuse providers and contact info:  Dr. Makeda Varela     Previous psychiatric/substance abuse providers and response to treatment:     Family history of mental illness or substance abuse: unclear    Substance abuse history:  none  Social History     Tobacco Use    Smoking status: Never    Smokeless tobacco: Never   Substance Use Topics    Alcohol use: Yes       History of biomedical complications associated with substance abuse: n/a    Patient's current acceptance of treatment or motivation for change: n/a    Family constellation:  1 child    Is significant other involved?  yes    Describe support system:     Describe living arrangements and home environment: lives with her  in their home    GUARDIAN/POA: NO    Guardian Name:     Guardian Contact:     Health issues:   Hospital Problems  Date Reviewed: 10/11/2022            Codes Class Noted POA    * (Principal) Schizoaffective disorder, bipolar type ([facility] ICD-10-CM: F25.0  ICD-9-CM: 295.70  2022 Unknown           Trauma history: denies    Legal issues: denies    History of  service: denies    Financial status: unemployed    Advent/cultural factors: expresses a strong meseret that the [employer] has cleansed her    Education/work history: has not worked in years    Have you been licensed as a health care professional (current or ): no    Describe coping skills:katalina stokes  2022

## 2022-12-07 NOTE — BH NOTES
Shift change report given to [facility] re: SBAR, medications, and behavior.   Anila fall risk score: 1

## 2022-12-07 NOTE — BH NOTES
Patient cooperative and guarded with staff. Quiet, withdrawn. This nurse attempted to talk with her to establish trust. Patient has paranoid delusions. +AH. Observed talking to self while visiting with her and her roommate in their room. Depressed mood. Constricted, blunted affect. C/o aching pain to right shoulder 3/10 but did not desire pain medication. Reluctant to take medications although explained to her the purpose and that it would help with discomfort. She replied \" I'm fine, I don't need it. \"  Did come out of room for group. Will answer questions when prompted. Speech soft. Ate 100% of snack. No SI/HI/VH. Rested in bed with eyes closed for 6 hours 30 minutes.

## 2022-12-07 NOTE — H&P
[facility] HISTORY AND PHYSICAL    Name:  Marlen Mason  MR#:  [id number]  :  1965  ACCOUNT #:  [de-identified]  ADMIT DATE:  2022      [facility] ADMISS[facility] NOTE    HISTORY OF PRESENT ILLNESS:  The patient is a 59-year-old  Alexander female who has a lengthy past psychiatric history of schizoaffective disorder, bipolar type. She was admitted on a TDO from the [facility] Emergency Room after presenting there on an ECO in a very psychotic state. Her  indicated that she had stopped taking her antipsychotic medications (Abilify and possibly Caplyta) on , believing that she had been healed Susan the holy spirit. \"  It is unclear how sick or ill she was at that point, but she had decompensated quite quickly to where she had become almost catatonic and was acting very bizarrely, such as crawling on the floor, not being responsive verbally at all, staring blankly, only speaking Alexander, etc.  She also became agitated and was given a dose of IM Geodon (20 mg) in the emergency room, and she slept on that and actually was doing much better the next morning (). She was still somewhat hyperreligious, but she was calm, communicating appropriately, and not engaging in any of the agitated or bizarre behaviors. However, even though she received 10 mg of Abilify yesterday afternoon, she was becoming increasingly psychotic again this morning. She is more paranoid, believing that staff are lying to her, and essentially refusing to take any medicine, believing there is nothing wrong with her. She seems to be internally distracted and preoccupied, and she had admitted previously that the [facility] spirit\" was often talking to her suggesting auditory hallucinations.   She called 911 believing that she is being held against her will here, and she clearly is not processing the situation accurately or appropriately, and remains quite delusional.  She is not exhibiting any overt manic symptoms and certainly, there is no depressive symptoms. If anything she may be slightly more hypomanic in that she is slightly restless, her thoughts seemed to be a bit accelerated, but her mood is clearly not elevated in any way. Her  had indicated that she actually had just been hospitalized for 2 months in [place] and had just been discharged not long ago, possibly as recently as a week ago, but perhaps a month or more ago. Obviously that suggests that she is somewhat refractory to treatment, slow to respond, and possibly often is noncompliant with treatment recommendations and plans. It was noted in past records that she had had a couple of neurologic assessments due to reported memory loss, but the initial one suggested that it was mostly all psychogenic and not necessarily due to any organic cognitive impairment. The second one did not really have a final diagnosis made, but there was no clear indication of underlying dementia. It should also be noted there is no history of any substance abuse, and urine drug screen and blood alcohol levels were both negative. PAST PSYCHIATRIC HISTORY:  She tells me she has been hospitalized four times in the past but that may not be accurate. The records indicated she had last been hospitalized 25 years ago but that is not correct because her  had indicated she had just been hospitalized for a 2-month stretch within the past month or two. She sees Dr. Pippa Oviedo and last saw him 3 weeks ago. She only mentions the Abilify and Cortez Earing as medicines that she may be taking now, and she did mention Zyprexa as a medicine she had taken previously. She otherwise would not provide any information about prior treatment. PAST MEDICAL HISTORY:  1. Hyperthyroidism with a history of a nodule. 2.  History of BTL. MEDICATIONS ON ADMISSION:  1. Abilify 10 mg after breakfast - - off this for 3-5 days  2.   Caplyta 42 mg at dinner - - off this for 3-5 days. 3.  ?Boniva 150 mg monthly. 4. ?Premarin 0.3175 mg cream p.r.n. ALLERGIES:  NO KNOWN ALLERGIES. FAMILY HISTORY:  Her father apparently had a history of dementia, but she denies any other family psychiatric history. SOCIAL HISTORY:  She has been  for many years. She apparently had two children, one of whom , but she would not tell me at what age. The oldest child (would be 40) is  in that she has a younger child age 39. She lives at home with her  in [facility]. She moved to the [place] 37 years ago. She has not been employed for many years, presumably due to her psychiatric illness. MENTAL STATUS EXAMINATION:  The patient was noted to be a casually dressed and well-groomed 68-year-old who appeared slightly younger than her stated age. She initially was reasonably cooperative, but as the interview progressed, she became less so and was quite clearly refusing to consider taking any medication or that she needed any treatment. She later escalated to becoming very agitated and even loud, but not physically aggressive or assaultive. She appeared to be internally preoccupied at times and there was occasional thought blocking noted. Most of her thoughts were tangential to the topic at hand and centered around her making Hoahaoism comments about the holy spirit and that she had been essentially cured, does not need treatment, etc.  There was no evidence of any depression and she denied feeling hopeless or having any suicidal thoughts, and there was no overt symptoms of frankie, although she may be very minimally hypomanic based on increased energy levels, some accelerated thinking, etc.  Her judgment and insight are obviously severely impaired. Her cognitive functioning shows notable deficits in areas of concentration and attention span at this point.     DIAGNOSTIC IMPRESSION:  AXIS I:  Schizoaffective disorder, bipolar-type (F25. 0). AXIS II:  Deferred. AXIS III:  S/P bilateral tubal ligation; history of thyroid issues. AXIS IV:  Stressors are mild. AXIS V:  Global Assessment of Functioning currently is 30-35. PLAN:  1. We will admit the patient for further supportive treatment, close observation, increased structure, and involvement within the groups and milieu when appropriate. 2.  We will restart the Abilify but increase the dose to 20 mg daily, possibly further. The goal would be to try to transition to a long-acting injectable of Abilify. 3.  I will hold off on the Lia [place]rap[place] 1943 at this point to try to simplify the medicine regimen, but we may need a second antipsychotic as she clearly has not responded well to single agents alone based on her history. 4.  We will also use trazodone and Vistaril on a p.r.n. basis for sleep and anxiety respectively. 5.  We will proceed with a Commitment Hearing tomorrow but her estimated length of stay may be as long as 2 weeks or more based on her history. I certify that this patient's inpatient psychiatric hospital admission is medically necessary for treatment which could reasonably be expected to improve her condition or for diagnostic study. The inpatient psychiatric services are provided while she is under the care of a physician and are included in the individualized plan of care.       Jasper Gibson MD      [id number]  D:  12/07/2022 11:32  T:  12/07/2022 12:06  JOB #:  [id number]

## 2022-12-07 NOTE — BH NOTES
Shift change report given to  Enzo Vera RN. Re; SBAR, medications, and behavior.    HELLER fall risk score; 1

## 2022-12-07 NOTE — BH NOTES
Affect & mood labile. Alert and oriented x 4 with disorganized thoughts. Denies pain. No SI/HI/AVH but having positive stimuli. Patient paranoid and delusional.   Patient irritable and verbally aggressive toward staff. Patient speech excessively loud at times. Patient very hyper-Zoroastrianism. Patient refused medications. Appetite good eats 100% of all meals plus snacks. Patient in no apparent distress. Vitals signs within normal limits. Patient TDO hearing tomorrow morning. Patient called 911 believing that she is being held against her will and that she needed to talk to an . Patient redirected and all other calls monitored by staff.

## 2022-12-07 NOTE — PROGRESS NOTES
Problem: Depressed Mood (Adult/Pediatric)  Goal: *STG: Remains safe in hospital  Outcome: Progressing Towards Goal     Problem: Depressed Mood (Adult/Pediatric)  Goal: *STG: Complies with medication therapy  Outcome: Not Progressing Towards Goal  Goal: *LTG: Returns to previous level of functioning and participates with after care plan  Outcome: Not Progressing Towards Goal  Goal: *LTG: Understands illness and can identify signs of relapse  Outcome: Not Progressing Towards Goal     Problem: Patient Education: Go to Patient Education Activity  Goal: Patient/Family Education  Outcome: Not Progressing Towards Goal

## 2022-12-07 NOTE — GROUP NOTE
PAT  GROUP DOCUMENTATION INDIVIDUAL                                                                          Group Therapy Note    Date: 12/7/2022    Group Start Time: 0900  Group End Time: 0964  Group Topic: Process Group - Inpatient    [facility] OP    [place], [address] [facility] GROUP DOCUMENTATION GROUP    Group Therapy Note    Focus of group session was a discussion on anxiety and what triggers anxiety and what anxiety feels like for each group member. We spoke about grounding techniques and the importance of returning to the present moment when anxiety is high and thoughts are starting to race and become irrational and fearful. We spoke about the techniques shared in an article from \"[employer]\" and the techniques include square breathing, developing mantras, touching and describing objects around us, and using declarative memory (name all the dog breeds you know, colors.)  We spoke about what techniques can be helpful for each group member. Attendance: Attended    Patient's Goal:      : Verbalizes anger, guilt, and other feelings in a constructive manor           Interventions/techniques: Informed, Reinforced, and Supported    Follows Directions: Followed directions    Interactions: Interacted appropriately    Mental Status: Anxious, Congruent, and Preoccupied    Behavior/appearance: Cooperative    Goals Achieved: Able to engage in interactions, Able to reflect/comment on own behavior, Able to self-disclose, Discussed coping, Identified feelings, and Identified triggers      Additional Notes:  Pt participated in the group activity and discussion. She was cooperative at times but also stated concerns she had about being inpatient. She does not feel it is right for her to have to take her medication if she chooses not to. She stated she likes to be informed about what is going on and would like to be told about some type of schedule for the day.  She stated that she is a strong Methodist and believes prayer helps her symptoms    Rome Esteves

## 2022-12-07 NOTE — PROGRESS NOTES
Pt expressed she has no anxiety or depression and slight physical pain but did not want to share with me where her pain was. 12/06/22 [facility] [address] meeting   Attendance Attended   Number of participants 7   Time in 2000   Time out 2025   Total Time 25   Interventions/techniques Supported   Follows directions Followed directions   Interactions Interacted appropriately   Mental Status Calm;Flat   Behavior/appearance Attentive; Cooperative   Suicide Protective Factors Denies intent

## 2022-12-08 PROCEDURE — 99231 SBSQ HOSP IP/OBS SF/LOW 25: CPT | Performed by: PSYCHIATRY & NEUROLOGY

## 2022-12-08 PROCEDURE — 74011250637 HC RX REV CODE- 250/637: Performed by: PSYCHIATRY & NEUROLOGY

## 2022-12-08 PROCEDURE — 65220000003 HC RM SEMIPRIVATE PSYCH

## 2022-12-08 RX ADMIN — ARIPIPRAZOLE 20 MG: 15 TABLET ORAL at 10:09

## 2022-12-08 NOTE — BH NOTES
Shift change report given to [facility] re: SBAR, medications, and behavior.   Anila fall risk score:  2

## 2022-12-08 NOTE — PROGRESS NOTES
12/08/22 [id number] [facility] meeting   Attendance Did not attend   Number of participants 3   Time in 1030   Time out 1050   Total Time 20   MTP problem Depression

## 2022-12-08 NOTE — CONSULTS
[address] [facility] Admission History & Physical        12/7/2022 7:45 PM  Patient: Gracie Noe 1965  PCP: Grace Cruz MD    HISTORY  Chief Complaint: No chief complaint on file. HPI: 62 y.o. female presenting for admission to [facility] for further evaluation and treatment for Schizoaffective disorder, bipolar type ([facility] [address] She  has a past medical history of Autonomous thyroid nodule (3/18/2017), Depression, Memory change, Multinodular goiter (3/18/2017), Musculoskeletal disorder, Snoring, and Subclinical hyperthyroidism (1/11/2017). Pt presents for admission to [address] [place], [place] [address] Unit TDO from the emergency department at [facility]. She has a long psychiatric history of schizoaffective disorder bipolar type. Her  presented a history that she had stopped her usual antipsychotic medication on Saturday, December 3, feeling her problem had been healed and she no longer required medication. She became catatonic and was described to be crawling on the floor, not responding verbally, staring blankly, and speaks Alexander only. In the ED she was treated with Geodon and awoke the following morning feeling better.    's evening she noted some ecchymosis and discomfort in the right wrist and forearm and noted some discomfort in her lower back. The right dorsal hand was apparently the site of her IV. The patient is not aware of it but she likely had some musculoskeletal strain associated with her D compensated psychosis. Patient denies any chronic medical illness including hypertension, asthma, COPD, palpitation, chest pain, dyspepsia, diarrhea, urinary or or frequency, skin disorder, musculoskeletal disorder. Denies the use of any tobacco or alcohol.     Past Medical History:  Past Medical History:   Diagnosis Date    Autonomous thyroid nodule 3/18/2017    Depression     Memory change     Multinodular goiter 3/18/2017    Musculoskeletal disorder Snoring     Subclinical hyperthyroidism 1/11/2017       Past Surgical History:  Past Surgical History:   Procedure Laterality Date    HX HYSTERECTOMY      HX TUBAL LIGATION         Medication:  Prior to Admission medications    Medication Sig Start Date End Date Taking? Authorizing Provider   Caplyta 42 mg capsule TAKE ONE CAPSULE BY MOUTH ONE TIME DAILY AFTER DINNER  Patient not taking: Reported on 12/6/2022 9/28/22   Provider, Historical   ibandronate (BONIVA) 150 mg tablet TAKE 1 TABLET ONCE A MONTH ( SAME DATE) WITH A FULL GLASS OFWATER AND REMAIN UPRIGHT POSITION AT LEAST 1 HOUR  Patient not taking: Reported on 12/6/2022 8/31/22   Provider, Historical   conjugated estrogens (PREMARIN) 0.625 mg/gram vaginal cream Apply 0.5 g to affected area as needed. Patient not taking: Reported on 12/6/2022    Provider, Historical   glucosamine-chondroitin ([address] [place] [address]) 500-400 mg cap Take 1 Cap by mouth daily. Patient not taking: Reported on 12/6/2022    Provider, Historical   vitamin e (E GEMS) 100 unit capsule Take  by mouth daily. Patient not taking: Reported on 12/6/2022    Provider, Historical   ARIPiprazole (ABILIFY) 5 mg tablet TK 2 TS PO QAM AFTER BREAKFAST  Patient not taking: Reported on 12/6/2022 5/1/18   Provider, Historical   TURMERIC ROOT EXTRACT PO Take  by mouth. Patient not taking: Reported on 12/6/2022    Provider, Historical   ginkgo biloba (GINKOBA PO) Take  by mouth two (2) times a day. Patient not taking: Reported on 12/6/2022    Provider, Historical   multivitamin (ONE A DAY) tablet Take 1 Tab by mouth daily. Patient not taking: Reported on 12/6/2022    Provider, Historical   [id number]-FA-Fish Oil 061--264 mg-mg-mcg-mcg cap Take  by mouth.   Patient not taking: Reported on 12/6/2022    Provider, Historical       Allergies:  No Known Allergies    Social History:  Social History     Tobacco Use    Smoking status: Never    Smokeless tobacco: Never   Substance Use Topics    Alcohol use: Yes    Drug use: No       Family History:  Family History   Problem Relation Age of Onset    Hypertension Father     Dementia Father     Stroke Father        ROS:    Total of 12 systems reviewed as follows:  POSITIVE= bolded text  Negative = text not bolded       General:  fever, chills, sweats, generalized weakness, weight loss/gain, loss of appetite   Eyes:    blurred vision, eye pain, loss of vision, double vision  ENT:    rhinorrhea, pharyngitis   Respiratory:  cough, sputum production, SOB, FORD, wheezing, pleuritic pain   Cardiology:   chest pain, palpitations, orthopnea, PND, edema, syncope   Gastrointestinal:  abdominal pain , N/V, diarrhea, dysphagia, constipation, bleeding   Genitourinary:  frequency, urgency, dysuria, hematuria, incontinence, prostatism   Muskuloskeletal: arthralgia, myalgia, back pain  Hematology:   easy bruising, nose or gum bleeding, lymphadenopathy   Dermatological: rash, ulceration, pruritis, color change / jaundice  Endocrine:   hot flashes or polydipsia   Neurological:  headache, dizziness, confusion, focal weakness, paresthesia, speech difficulties, memory loss, gait difficulty  Psychological: feelings of anxiety, depression, agitation      PHYSICAL EXAM:  Patient Vitals for the past 24 hrs:   Temp Pulse Resp BP SpO2   12/07/22 1908 98.5 °F (36.9 °C) 82 17 130/70 100 %   12/07/22 0726 98.8 °F (37.1 °C) 89 17 128/60 99 %       General:    Alert, cooperative, no distress, appears stated age. HEENT: Atraumatic, anicteric sclerae, pink conjunctivae     No oral ulcers, mucosa moist, throat clear, dentition fair  Neck:  Supple, symmetrical;   thyroid non tender  Lungs:   Clear to auscultation bilaterally. No wheezing or rhonchi. No rales. Chest wall:  No tenderness. No accessory muscle use. Heart:   Regular rhythm. No  murmur. No edema  Abdomen:   Soft, non-tender. Not distended. Bowel sounds normal  Extremities: No cyanosis.   Ecchymosis R dorsal hand, R anticubital area Capillary refill normal,  Radial pulse 2+,  DP 2+  M/S  (Asked nursing to check lumbar / sacral area for injury)  Skin:     Not pale. Not jaundiced. No rashes   Psych:  Not depressed. Mildly anxious - hoping for discharge in AM  Neurologic: EOMs intact. No facial asymmetry. No aphasia or slurred speech. Symmetrical strength, Sensation grossly intact. Alert and oriented    Lab Data Reviewed:     12/6/22 00:51   WBC 5.8   NRBC 0.0   RBC 3.89   HGB 12.1   HCT 35.5   MCV 91.3   MCH 31.1   MCHC 34.1   RDW 13.5   PLATELET 039   MPV 9.6   NEUTROPHILS 84 (H)   LYMPHOCYTES 11 (L)   MONOCYTES 5   EOSINOPHILS 0   BASOPHILS 0      12/6/22 00:51   Color Yellow/Straw   Appearance Clear   Specific gravity 1.020   pH (UA) 5.0   Protein Negative   Glucose Negative   Ketone Negative   Blood Negative   Bilirubin Negative   Urobilinogen 0.1 (L)   Nitrites Negative   Leukocyte Esterase Negative   Mucus 2+ ! WBC 0-4   RBC 0-5   Bacteria Negative      12/6/22 00:51   Sodium 143   Potassium 3.7   Chloride 111 (H)   CO2 21   Anion gap 11   Glucose 116 (H)   BUN 17   Creatinine 0.73   BUN/Creatinine ratio 23 (H)   Calcium 9.0   eGFR >60   Bilirubin, total 0.4   Protein, total 6.7   Albumin 3.6   Globulin 3.1   A-G Ratio 1.2   ALT 27   AST 21   Alk.  phosphatase 57      12/6/22 00:51   ALCOHOL(ETHYL),SERUM <10   AMPHETAMINES Negative   BARBITURATES Negative   BENZODIAZEPINES Negative   COCAINE Negative   METHADONE Negative   OPIATES Negative   PCP(PHENCYCLIDINE) Negative   THC (TH-CANNABINOL) Negative      12/6/22 00:51   Influenza A by PCR Not Detected   Influenza B by PCR Not Detected   SARS-COV-2 PCR Not detected     EKG: none    Radiology:none    Care Plan discussed with:   Patient x    Family     RN x         Consultant      Expected  Disposition:   Home with Family x   HH/PT/OT/RN    SNF/LTC    CHELSEY      TOTAL TIME:  35 Minutes      Comments    x Reviewed previous records   >50% of visit spent in counseling and coordination of care x Discussion with patient and/or family and questions answered       _______________________________________________________     My assessment of this patient's clinical condition and my plan of care is as follows.     ASSESSMENT / PLAN    Principal Problem:    Schizoaffective disorder, bipolar type ([address] (12/6/2022)  Presenting with psychosis after stopping routine medicines  Suspect some minor trauma to the right forearm and lumbosacral area during her agitated behaviors  Evaluation and treatment per Dr. Kian Diaz inpatient  Current treatment includes Abilify  Patient states she \"does not want to take any medicines routinely\"    Active Problems:    Multinodular goiter (3/18/2017)  Exam unremarkable  Follow TFTs      Contusion of right wrist (12/7/2022)  Likely related to self trauma a/w psychosis  No marked finding in exam  Consider for xrays if symptoms linger    SAFETY:   Code Status: full code  DVT prophylaxis: no anticoagulation allowed on [facility]  Stress Ulcer prophylaxis: Not Indicated  Bladder catheter: no  Family Contact Info:  Primary Emergency Contact: [address] [facility] [place] Phone: [phone]  Bedded: [facility] Room 232/02  Disposition: TBD, likely home when stable  Admission status: Inpatient Andrez Rose MD  [facility] Hospitalist  [phone]

## 2022-12-08 NOTE — PROGRESS NOTES
Problem: Falls - Risk of  Goal: *Absence of Falls  Description: Document [place] Fall Risk and appropriate interventions in the flowsheet.   Outcome: Progressing Towards Goal  Note: Fall Risk Interventions:            Medication Interventions: Teach patient to arise slowly                   Problem: Psychosis  Goal: *STG: Remains safe in hospital  Outcome: Progressing Towards Goal     Problem: Depressed Mood (Adult/Pediatric)  Goal: *STG: Attends activities and groups  Outcome: Progressing Towards Goal  Goal: *STG: Remains safe in hospital  Outcome: Progressing Towards Goal Pt spine incision with dressing intact, positive NV status. VS stable Afebrile. pt gaby po diet. Ostomy and urostomy as pre-admission status./Skilled Nursing Facility

## 2022-12-08 NOTE — PROGRESS NOTES
Problem: Depressed Mood (Adult/Pediatric)  Goal: *STG: Participates in treatment plan  Outcome: Progressing Towards Goal  Goal: *STG: Attends activities and groups  Outcome: Progressing Towards Goal  Goal: *STG: Remains safe in hospital  Outcome: Progressing Towards Goal  Goal: *STG: Complies with medication therapy  Outcome: Progressing Towards Goal     Problem: Patient Education: Go to Patient Education Activity  Goal: Patient/Family Education  Outcome: Progressing Towards Goal

## 2022-12-08 NOTE — BH NOTES
Affect blunted/constricted, mood labile. Alert and oriented x 4. Denies pain. No SI/HI/ but having positive auditory and visual hallucinations. Patient paranoid and delusional.  Patient cooperative and able to redirect. Speech excessively loud. Patient very hyper-Holiness. Patient on her knees praying and telling peers that \"Garo love them. \" Medication compliant and no PRN's requested or given. Appetite adequate. Patient in no apparent distress. Vitals signs within normal limits.

## 2022-12-08 NOTE — ROUTINE PROCESS
Shift change report given to Cleo Juárez, re: BENNIEAR. Medications, and behavior.   Anila Fall Risk Score (1)

## 2022-12-08 NOTE — BH NOTES
Affect blunted, mood labile. Pt attended but minimally participated in scheduled group activities. Pt was not social with staff and peers and was a bit intrusive at times. Pt vital signs stable and she complained of some lower back pain which she thought was bruised but staff observed no marks on her back. Pt denies SI/HI/AVH. Pt was offered medications for pain but refused. Pt ate 100% of snack. Pt has no scheduled medications for evening/night. Pt is in no apparent distress at this time and made no overtly delusional statements. Pt did get up multiple times during the night praying and yelling loudly. Pt rested in her bed with her eyes closed for 3.5 hours.

## 2022-12-08 NOTE — PROGRESS NOTES
INTERVAL Hx:  Records and clinical information were reviewed. No real change--still delusional (Congregation and paranoid), but not physically aggressive . Can get agitated when trying to argue her belief that the [facility] has essentially cured her. Was committed with an order to medicate over objection, but she doesn't seem to fully understand this. Focuses on small issues of misunderstanding and then says we're lying to her. Will order IM Geodon to use if she refuses the PO Abilify. Slept only 3.5 hours last night. MEDS:  Current Facility-Administered Medications   Medication Dose Route Frequency    ARIPiprazole (ABILIFY) tablet 20 mg  20 mg Oral DAILY    Or    ziprasidone (GEODON) 10 mg in sterile water (preservative free) 0.5 mL injection  10 mg IntraMUSCular DAILY    ziprasidone (GEODON) 20 mg in sterile water (preservative free) 1 mL injection  20 mg IntraMUSCular Q12H PRN    acetaminophen (TYLENOL) tablet 650 mg  650 mg Oral Q4H PRN    ibuprofen (MOTRIN) tablet 400 mg  400 mg Oral Q8H PRN    magnesium hydroxide (MILK OF MAGNESIA) 400 mg/5 mL oral suspension 30 mL  30 mL Oral DAILY PRN    alum-mag hydroxide-simeth (MYLANTA) oral suspension 30 mL  30 mL Oral Q4H PRN    traZODone (DESYREL) tablet 50 mg  50 mg Oral QHS PRN    hydrOXYzine pamoate (VISTARIL) capsule 50 mg  50 mg Oral TID PRN       VITALS: No data found. LABS: .No results found for this or any previous visit (from the past 24 hour(s)). MSE:   Appearance: casually dressed; adequately groomed  Behavior: refusing Tx but not consistently agitated  Motor: normal  Mood/Affect: irritable at times  Thought Process: mostly organized but can derail  Thought Content: Congregation and paranoid delusions; denies SI/HI  Perceptions: likely +AH's  Insight/Judgment: nil    ASSESSMENT:   1.  Schizoaffective disorder, bipolar type (F25.0)    PLAN:  1. Start Abilify at 20 mg daily--likely increase if indicated.    2.  Order Geodon at 10 mg IM if she refuses the Abilify. 3.  May need other med options, but will see how well the Abilify works alone first.  4.  ELOS: 7-14 days, depending on her response and clinical course.

## 2022-12-08 NOTE — PROGRESS NOTES
12/07/22 2217 2000 [facility] meeting   Attendance Attended   Number of participants 6   Time in 2000   Time out 2045   Total Time 45   MTP problem Pain   Interventions/techniques Supported   Follows directions Followed directions   Interactions Interacted appropriately   Mental Status Calm   Behavior/appearance Cooperative   Suicide Risk Factors No thoughts of suicide   Suicide Protective Factors Denies intent   Goals Achieved Able to engage in interactions     She has no anxiety or depression but has lower back pain at a 4.

## 2022-12-08 NOTE — BH NOTES
Patient had her TDO hearing today she did participate in hearing. Patient was involuntarily committed and she was court ordered to take her medication. SW faxed copy of the order to the pharmacy. Patient was started on Abilify with a plan to switch to a long acting injectable. Patient will return home where she lives with her  and she follows up with DR. Neha Wiseman. Patient is doing her ADL care and has been quiet today in her room journaling.

## 2022-12-08 NOTE — GROUP NOTE
PAT  GROUP DOCUMENTATION INDIVIDUAL                                                                          Group Therapy Note    Date: 12/8/2022    Moon Duenas was in her commitment hearing during group session.     Delores Batista

## 2022-12-09 PROCEDURE — 99231 SBSQ HOSP IP/OBS SF/LOW 25: CPT | Performed by: PSYCHIATRY & NEUROLOGY

## 2022-12-09 PROCEDURE — 65220000003 HC RM SEMIPRIVATE PSYCH

## 2022-12-09 PROCEDURE — 74011250637 HC RX REV CODE- 250/637: Performed by: PSYCHIATRY & NEUROLOGY

## 2022-12-09 RX ORDER — ARIPIPRAZOLE 15 MG/1
30 TABLET ORAL DAILY
Status: DISCONTINUED | OUTPATIENT
Start: 2022-12-09 | End: 2022-12-12

## 2022-12-09 RX ADMIN — ARIPIPRAZOLE 30 MG: 15 TABLET ORAL at 08:48

## 2022-12-09 NOTE — PROGRESS NOTES
12/08/22 [id number] [facility]   Attendance Attended   Number of participants 3   Time in 2000   Time out 2030   Total Time 30   MTP problem Pain   Interventions/techniques Supported   Follows directions Followed directions   Interactions Interacted appropriately   Mental Status Calm   Behavior/appearance Cooperative   Suicide Risk Factors No thoughts of self harm. Suicide Protective Factors Denies intent   Goals Achieved Able to engage in interactions     She has no anxiety or depression at this time but has lower back pain at a 3.

## 2022-12-09 NOTE — BH NOTES
Affect blunted. Mood labile. Quiet. Alert and oriented x 3. Restless. Took all sheets off of bed. Did not want to remake it. Sat in day room and watched Religion program.  When a staff member said \"Oh Lord\" came out of room and stated she shouldn't \"take the Lord's name in vain. \"  Genuflected when meds given. Med compliant. Ate 1/2. Denies SI/HI/AVH/pain. Denies SI's with med. Vague.

## 2022-12-09 NOTE — PROGRESS NOTES
INTERVAL Hx:  Records and clinical information were reviewed. Still delusional (Alevism and paranoid), with no insight at all. Believes she's been cured by God and doesn't need meds, but she is taking the Abilify PO at this point because of the legal order. I have increased the Abilify to 30 mg this AM. No SE's with it so far, and she states she never had SE's with it previously (up to 15 mg, per ). Will see if a single agent alone is effective at a more therapeutic dose (for psychosis), and hope to be able to switch to a depot formulation if she'll allow. Can still get verbally agitated, but not physically. Slept only 3.5 hours again last night. MEDS:  Current Facility-Administered Medications   Medication Dose Route Frequency    ARIPiprazole (ABILIFY) tablet 30 mg  30 mg Oral DAILY    Or    ziprasidone (GEODON) 10 mg in sterile water (preservative free) 0.5 mL injection  10 mg IntraMUSCular DAILY    ziprasidone (GEODON) 20 mg in sterile water (preservative free) 1 mL injection  20 mg IntraMUSCular Q12H PRN    acetaminophen (TYLENOL) tablet 650 mg  650 mg Oral Q4H PRN    ibuprofen (MOTRIN) tablet 400 mg  400 mg Oral Q8H PRN    magnesium hydroxide (MILK OF MAGNESIA) 400 mg/5 mL oral suspension 30 mL  30 mL Oral DAILY PRN    alum-mag hydroxide-simeth (MYLANTA) oral suspension 30 mL  30 mL Oral Q4H PRN    traZODone (DESYREL) tablet 50 mg  50 mg Oral QHS PRN    hydrOXYzine pamoate (VISTARIL) capsule 50 mg  50 mg Oral TID PRN       VITALS: Patient Vitals for the past 12 hrs:   Temp Pulse Resp BP SpO2   12/09/22 0740 (!) 96.7 °F (35.9 °C) 99 18 126/78 97 %       LABS: .No results found for this or any previous visit (from the past 24 hour(s)).     MSE:   Appearance: casually dressed; adequately groomed  Behavior: not consistently agitated  Motor: normal  Mood/Affect: irritable at times  Thought Process: mostly organized but can derail  Thought Content: Alevism and paranoid delusions; denies SI/HI  Perceptions: likely +AH's  Insight/Judgment: nil    ASSESSMENT:   1.  Schizoaffective disorder, bipolar type (F25.0)    PLAN:  1. Increase the Abilify to 30 mg daily. 2.  Continue the Geodon at 10 mg IM if she refuses the Abilify. 3.  May need other med options, but will see how well the Abilify works alone first.  4.  ELOS: 7-14 days, depending on her response and clinical course.

## 2022-12-09 NOTE — PROGRESS NOTES
Problem: Depressed Mood (Adult/Pediatric)  Goal: *STG: Participates in treatment plan  Outcome: Progressing Towards Goal  Note: Affect blunted. Mood labile. Quiet. Alert and oriented x 3. Restless. Took all sheets off of bed. Did not want to remake it. Sat in day room and watched Anglican program.  When a staff member said \"Oh \" came out of room and stated she shouldn't \"take the Lord's name in vain. \"  Genuflected when meds given. Med compliant. Ate 1/2. Denies SI/HI/AVH/pain. Denies SI's with med. Vague.

## 2022-12-09 NOTE — ROUTINE PROCESS
Shift change report given to Roque Schirmer, re: JOSEFINA. Medications, and behavior.   Anila Fall Risk Score (2)

## 2022-12-09 NOTE — GROUP NOTE
PAT  GROUP DOCUMENTATION INDIVIDUAL                                                                          Group Therapy Note    Date: 12/9/2022    Group Start Time: 0900  Group End Time: 2941  Group Topic: Process Group - Inpatient    [facility] OP    [place], [address] [facility] GROUP DOCUMENTATION GROUP    Group Therapy Note  Focus of group session was a discussion on anxiety and what triggers anxiety and what anxiety feels like for each group member. We spoke about grounding techniques and the importance of returning to the present moment when anxiety is high and thoughts are starting to race and become irrational and fearful. We spoke about the techniques shared in an article from \"[employer]\" and the techniques include square breathing, developing mantras, touching and describing objects around us, and using declarative memory (name all the dog breeds you know, colors.)  We spoke about what techniques can be helpful for each group member. Attendance: Attended    Patient's Goal:      Verbalizes anger, guilt, and other feelings in a constructive manor           Interventions/techniques: Challenged, Informed, Reinforced, and Supported    Follows Directions: Followed directions    Interactions: Interacted appropriately    Mental Status: Preoccupied and Suspicious    Behavior/appearance: Agitated and Cooperative    Goals Achieved: Able to engage in interactions, Able to listen to others, Able to self-disclose, Discussed coping, Identified feelings, Identified triggers, Identified relapse prevention strategies, and Identified medication adherence strategies      Additional Notes:  Pt continues to express concerns regarding having to take her medication. She was court ordered to comply in her TDO meeting and she took her dose last night. She stated that she took her medication this morning but there was an increase in her dosage and it was leaving a funny taste in her mouth.  She is not used to taking a higher dose and the concerns her. We discussed her concerns and I acknowledged that I was hearing her and she thanked me.      Connie Lozada

## 2022-12-09 NOTE — PROGRESS NOTES
Problem: Falls - Risk of  Goal: *Absence of Falls  Description: Document Shade Conneaut Fall Risk and appropriate interventions in the flowsheet.   Outcome: Progressing Towards Goal  Note: Fall Risk Interventions:            Medication Interventions: Teach patient to arise slowly                   Problem: Depressed Mood (Adult/Pediatric)  Goal: *STG: Attends activities and groups  Outcome: Progressing Towards Goal  Goal: *STG: Remains safe in hospital  Outcome: Progressing Towards Goal     Problem: Psychosis  Goal: *STG: Remains safe in hospital  Outcome: Progressing Towards Goal

## 2022-12-09 NOTE — BH NOTES
Affect blunted, mood labile. Pt attended and minimally participated in scheduled group activities. Pt was minimally social with staff and peers. Pt ate 100% of snack. Pt vital signs stable with complaints of some minor lower bask pain. Pt denies SI/HI/AVH. Pt is still very religiously occupied, praying in the campos and in her room. Pt became loud during the night with her prayers. Pt continues to be very paranoid and not want any medications. Pt has no scheduled medications for evening /night and although offered medications for her back pain she refused any PRN's. Pt is in no apparent distress at this time and made no overtly delusional statements. Pt rested in her bed with her eyes closed for 3.5 hours.

## 2022-12-10 PROCEDURE — 65220000003 HC RM SEMIPRIVATE PSYCH

## 2022-12-10 PROCEDURE — 74011250637 HC RX REV CODE- 250/637: Performed by: PSYCHIATRY & NEUROLOGY

## 2022-12-10 RX ADMIN — ARIPIPRAZOLE 30 MG: 15 TABLET ORAL at 08:19

## 2022-12-10 NOTE — BH NOTES
Affect labile. Mood anxious/labile. Alert and oriented x 3. Able to do own ADL's. Restless. Ate 1/2 of meals and snacks. Seen praying frequently and kneeling down in day room and bed room. Crosses self before taking meds. Took Abilify 30mg po this am as ordered but feels she is \"being overdosed because it is 2 pills. \"  Explained that she has 2 15mg pills to equal 30mg. NP explained that she is getting appropriate dose but still not convinced. Later wanted me to \"draw blood to check for overdose. \"  Screamed out that we were making her sick by making her take meds. \"I am being kept like in an [place] [facility]. \"  Asked to review the commitment papers which was done with explanations of process given. Denies SI/HI/AVH/pain. Watched Evangelical program on TV while praying. Med compliant with hesitation.

## 2022-12-10 NOTE — ROUTINE PROCESS
Shift change report given to Bebe Lau, Rn, re: SBAR. Medications, and behavior.   Anila Fall Risk Score (1)

## 2022-12-10 NOTE — PROGRESS NOTES
INTERVAL Hx:  Records and clinical information were reviewed. Still delusional (Mosque and paranoid), with no insight at all. Continues to believe that she is  cured by God and doesn't need meds, but she is taking the Abilify PO at this point because of the legal order. She recently had an increase in Abilify and believes that she is being given an overdose of medications. Attempted to explain that she is receiving two pills of 15 mg that equals 30 mg, not two 30 mg tablets. She reports some mild tremor in the right hand, denies any other side effects. Reports that she slept one hour last night. Encouraged to take trazodone at bedtime. MEDS:  Current Facility-Administered Medications   Medication Dose Route Frequency    ARIPiprazole (ABILIFY) tablet 30 mg  30 mg Oral DAILY    Or    ziprasidone (GEODON) 10 mg in sterile water (preservative free) 0.5 mL injection  10 mg IntraMUSCular DAILY    ziprasidone (GEODON) 20 mg in sterile water (preservative free) 1 mL injection  20 mg IntraMUSCular Q12H PRN    acetaminophen (TYLENOL) tablet 650 mg  650 mg Oral Q4H PRN    ibuprofen (MOTRIN) tablet 400 mg  400 mg Oral Q8H PRN    magnesium hydroxide (MILK OF MAGNESIA) 400 mg/5 mL oral suspension 30 mL  30 mL Oral DAILY PRN    alum-mag hydroxide-simeth (MYLANTA) oral suspension 30 mL  30 mL Oral Q4H PRN    traZODone (DESYREL) tablet 50 mg  50 mg Oral QHS PRN    hydrOXYzine pamoate (VISTARIL) capsule 50 mg  50 mg Oral TID PRN       VITALS: Patient Vitals for the past 12 hrs:   Temp Pulse Resp BP SpO2   12/10/22 0750 97.3 °F (36.3 °C) 94 17 (!) 114/57 100 %         LABS: .No results found for this or any previous visit (from the past 24 hour(s)).     MSE:   Appearance: casually dressed; adequately groomed  Behavior: not consistently agitated  Motor: normal  Mood/Affect: labile  Thought Process: mostly organized but can derail  Thought Content: Mosque and paranoid delusions; denies SI/HI  Perceptions: likely +AH's  Insight/Judgment: nil    ASSESSMENT:   1.  Schizoaffective disorder, bipolar type (F25.0)    PLAN:  1. Cont. Abilify to 30 mg daily. 2.  Continue the Geodon at 10 mg IM if she refuses the Abilify. 3.  May need other med options, but will see how well the Abilify works alone first.  4.  ELOS: 7-14 days, depending on her response and clinical course.

## 2022-12-10 NOTE — PROGRESS NOTES
Problem: Patient Education: Go to Patient Education Activity  Goal: Patient/Family Education  Outcome: Progressing Towards Goal     Problem: Falls - Risk of  Goal: *Absence of Falls  Description: Document Lettie Duverney Fall Risk and appropriate interventions in the flowsheet.   Outcome: Progressing Towards Goal  Note: Fall Risk Interventions:            Medication Interventions: Teach patient to arise slowly

## 2022-12-10 NOTE — BH NOTES
Affect constricted/labile, mood labile. Alert and Oriented X 4. Guarded and distracted. Refused to attend group. . Interacted with staff and peers. Makes needs known. Ate snacks. Denied SI/HI/AVH and pain. Medication compliant. Stable with no s/s of distress.

## 2022-12-10 NOTE — BH NOTES
Affect blunted, mood labile. Pt was up in her room when I arrived. Pt was still awake most of the night praying in her room loudly and coming out into the hallway multiple times to ask questions. Pt continues to be responding to some internal stimuli and is still paranoid. Pt did not request a PRN. Pt is in no apparent distress at this time and made no overtly delusional statements. Pt rested in her bed with her eyes closed for 1 hours.

## 2022-12-10 NOTE — BH NOTES
Shift change report given to Jose F Walter RN re: SBAR, medications, and behavior.   Anila fall risk score 1

## 2022-12-10 NOTE — PROGRESS NOTES
Problem: Paranoid Ideation  Goal: *LTG: Report reduced vigilance & suspicion around others as well as more relaxed, trusting, & open interaction  Outcome: Not Progressing Towards Goal  Note: Affect labile. Mood anxious/labile. Alert and oriented x 3. Able to do own ADL's. Restless. Ate 1/2 of meals and snacks. Seen praying frequently and kneeling down in day room and bed room. Crosses self before taking meds. Took Abilify 30mg po this am as ordered but feels she is \"being overdosed because it is 2 pills. \"  Explained that she has 2 15mg pills to equal 30mg. NP explained that she is getting appropriate dose but still not convinced. Later wanted me to \"draw blood to check for overdose. \"  Screamed out that we were making her sick by making her take meds. \"I am being kept like in an [facility]. \"  Asked to review the commitment papers which was done with explanations of process given. Denies SI/HI/AVH/pain. Watched Synagogue program on TV while praying.      Problem: Psychosis  Goal: *STG: Remains safe in hospital  Outcome: Progressing Towards Goal

## 2022-12-10 NOTE — PROGRESS NOTES
Patient did not attend group , she was on the  phone    12/09/22 6422   2000 [facility] meeting   Attendance Did not attend   Number of participants 2   Time in 2000   Time out 2020   Total Time 20

## 2022-12-10 NOTE — PROGRESS NOTES
12/10/22 [address] [place] [address] Rd meeting   Attendance Attended   Number of participants 2   Time in 0200   Time out 0230   Total Time 30   MTP problem Depression   Interventions/techniques Supported   Follows directions Followed directions   Interactions Interacted appropriately   Mental Status Calm   Behavior/appearance Cooperative; Motivated   Suicide Risk Factors STATES SHE HAS NO THOUGHTS OF HURTING HERSELF OR OTHERS   Suicide Protective Factors Denies intent   Goals Achieved Able to give feedback to another;Able to listen to others; Able to engage in interactions         Ruth Hough CNA

## 2022-12-11 PROCEDURE — 74011250637 HC RX REV CODE- 250/637: Performed by: PSYCHIATRY & NEUROLOGY

## 2022-12-11 PROCEDURE — 65220000003 HC RM SEMIPRIVATE PSYCH

## 2022-12-11 RX ADMIN — ARIPIPRAZOLE 30 MG: 15 TABLET ORAL at 08:36

## 2022-12-11 NOTE — PROGRESS NOTES
INTERVAL Hx:  Records and clinical information were reviewed. Remains paranoid with Restorationist preoccupation, on her floor in the day room praying on the floor. She continues to insist that we are overdosing her on her medication. She is demanding to have blood work ordered to check Abilify level. Attempted to explain to her that she had blood work completed on admission and Abilify is not a level that can be checked for her. She became irritable and agitated, stating that if I was a doctor I would know these things. Refused redirection, demanding that she does not need to take medications. Nursing staff reports that she did sleep better last, slept 8 hours. MEDS:  Current Facility-Administered Medications   Medication Dose Route Frequency    ARIPiprazole (ABILIFY) tablet 30 mg  30 mg Oral DAILY    Or    ziprasidone (GEODON) 10 mg in sterile water (preservative free) 0.5 mL injection  10 mg IntraMUSCular DAILY    ziprasidone (GEODON) 20 mg in sterile water (preservative free) 1 mL injection  20 mg IntraMUSCular Q12H PRN    acetaminophen (TYLENOL) tablet 650 mg  650 mg Oral Q4H PRN    ibuprofen (MOTRIN) tablet 400 mg  400 mg Oral Q8H PRN    magnesium hydroxide (MILK OF MAGNESIA) 400 mg/5 mL oral suspension 30 mL  30 mL Oral DAILY PRN    alum-mag hydroxide-simeth (MYLANTA) oral suspension 30 mL  30 mL Oral Q4H PRN    traZODone (DESYREL) tablet 50 mg  50 mg Oral QHS PRN    hydrOXYzine pamoate (VISTARIL) capsule 50 mg  50 mg Oral TID PRN       VITALS: Patient Vitals for the past 12 hrs:   Temp Pulse Resp BP SpO2   12/11/22 0756 97.5 °F (36.4 °C) (!) 108 18 113/65 100 %         LABS: .No results found for this or any previous visit (from the past 24 hour(s)). MSE:   Appearance: casually dressed; adequately groomed  Behavior: irritable, agitated. Motor: normal  Mood/Affect: irritable.    Thought Process: mostly organized but can derail  Thought Content: Restorationist and paranoid delusions; denies SI/HI  Perceptions: likely +AH's  Insight/Judgment: nil    ASSESSMENT:   1.  Schizoaffective disorder, bipolar type (F25.0)    PLAN:  1. Cont. Abilify to 30 mg daily. 2.  Continue the Geodon at 10 mg IM if she refuses the Abilify. 3.  May need other med options, but will see how well the Abilify works alone first.  4.  ELOS: 7-14 days, depending on her response and clinical course.

## 2022-12-11 NOTE — PROGRESS NOTES
Problem: Falls - Risk of  Goal: *Absence of Falls  Description: Document Tere Estrada Fall Risk and appropriate interventions in the flowsheet.   Outcome: Resolved/Met  Note: Fall Risk Interventions:            Medication Interventions: Teach patient to arise slowly                   Problem: Depressed Mood (Adult/Pediatric)  Goal: *STG: Attends activities and groups  Outcome: Progressing Towards Goal     Problem: Paranoid Ideation  Goal: *LTG: Interact with others without defensiveness or anger  Outcome: Progressing Towards Goal

## 2022-12-11 NOTE — BH NOTES
Affect labile. Mood anxious. Alert and oriented  x 4. This morning, during Mass on TV, participated by getting down on knees an praying for approx 30 min. Med compliant. Later, on rounds with NP, loudly demanded bloodwork to check for overdosing of Abilify. Even though she has been repeatedly told this is approp dose, will not believe this. Yelled at NP, telling her she had no education and was not a doctor. Perseverative in her delusional, paranoid ideations. Reassured that she is being monitored with VS and has had appropriate bloodwork prior to starting Abilify. Calmer when listened to classical music midday. Denies SI/HI/AVH/pain. Attended group active as active participant. NP notified of am HR in 108-121 range. Repeated prior to lunch and it was below 100. Played Bingo after supper with peers. no blurred vision/no change in level of consciousness/no confusion

## 2022-12-11 NOTE — PROGRESS NOTES
Problem: Psychosis  Goal: *STG: Remains safe in hospital  Outcome: Progressing Towards Goal  Note: Affect labile. Mood anxious. Alert and oriented  x 4. This morning, during Mass on TV, participated by getting down on knees an praying for approx 30 min. Med compliant. Later, on rounds with NP, loudly demanded bloodwork to check for overdosing of Abilify. Even though she has been repeatedly told this is approp dose, will not believe this. Yelled at NP, telling her she had no education and was not a doctor. Perseverative in her delusional, paranoid ideations. Reassured that she is being monitored with VS and has had appropriate bloodwork prior to starting Abilify. Calmer when listened to classical music midday. Denies SI/HI/AVH/pain. Attended group active as active participant. NP notified of am HR in 108-121 range. Repeated prior to lunch and it was below 100.

## 2022-12-11 NOTE — PROGRESS NOTES
[facility]   Attendance Attended   Number of participants 2   Time in 0215   Time out 0245   Total Time 30   MTP problem Depression   Interventions/techniques Supported   Follows directions Followed directions   Interactions Interacted appropriately   Mental Status Calm   Behavior/appearance Cooperative; Motivated   Suicide Risk Factors STATES THAT SHE HAS NO THOUGHTS OF HARMING HERSELF OR ANYONE ELSE   Suicide Protective Factors Denies intent   Goals Achieved Able to engage in interactions; Able to listen to others; Able to give feedback to another       Yisel Garcia CNA

## 2022-12-11 NOTE — BH NOTES
Pt was up in room praying at shift change. Pt was pacing and praying in her room all night. Pt was offered PRN medication to help her sleep but she refused. Pt denies SI/HI/AVH but is still clearly paranoid and reacting to some internal stimuli. Pt came out into the hallway multiple times during the night and did not rest well. Pt rested in her bed with her eyes closed for 6.75 hours.

## 2022-12-11 NOTE — BH NOTES
Patient spend time in the dayroom playing cards with peers. Denies SI/HI. Patient is paranoid. Refused pain medication for pain 3/10. Patient was in her room prayer prior to going to bed.

## 2022-12-11 NOTE — PROGRESS NOTES
12/10/22 2106   Group Therapy   Group Community meeting   Attendance Attended   Number of participants 4   Time in 2000   Time out 2045   Total Time 45   MTP problem Other (comment)  (Schizoaffective disorder, bipolar-type)   Interventions/techniques Supported   Follows directions Followed directions   Interactions Interacted appropriately   Mental Status Delusions; Labile   Behavior/appearance Argumentative; Cooperative   Suicide Risk Factors Un has no thoughts of hurting herself nor anyone else   Suicide Protective Factors Denies intent   Goals Achieved Able to engage in interactions; Able to listen to others; Able to give feedback to another     Radha Juárez was out for the Wrap-Up Group tonight. She stated that she is having a okay day, did not eat much food is bad and couldn't eat, and thinks she will sleep tonight. She is having no anxiety nor depression. Something good that happened today was she follows the rules, and bad was only the bed food it all tasted bitter. She is having no thoughts of hurting herself nor anyone else and is having lower back and right shoulder pain of a 3. Nurses notified.     Petr Anderson CNA

## 2022-12-11 NOTE — MASTER TREATMENT PLAN
Master Treatment Plan Review for Alice Bartlett    Date of Admission Date Treatment Plan Reviewed Anticipated Date of Discharge Legal Status    12/06/2022 12/10/2022  Involuntary Commitment and Forced Medication Order     Treatment & Discharge Planning Changes    Modality or Intervention Changes N/A     Psychotropic Medication Changes 12/08/2022-Abilify 20 mg PO Daily, if refused give Geodon 10 mg IM  12/09/2022- increase Abilify to 30 mg PO Daily   Discharge Planning or Target Date Changes ELOS: 7-14 days, depending on her response and clinical course. New Issues N/A       Treatment Team Signatures    I have participated in the development of this plan of treatment and agree to its implementation.     Patient Signature       Patient Printed Name Date/Time   /Therapist Signature       /Therapist Printed Name Date/Time   RN Signature       RN Printed Name  Jean-Claude Johnson [facility] Date/Time  12/10/2022  @1182   Unit  Signature       Unit  Printed Name Date/Time   MD Signature       MD Printed Name Date/Time

## 2022-12-11 NOTE — ROUTINE PROCESS
Shift change report given to Gracie Cao, re: SBAR. Medications, and behavior.   Anila Fall Risk Score (2)

## 2022-12-12 PROCEDURE — 99232 SBSQ HOSP IP/OBS MODERATE 35: CPT | Performed by: PSYCHIATRY & NEUROLOGY

## 2022-12-12 PROCEDURE — 74011250637 HC RX REV CODE- 250/637: Performed by: PSYCHIATRY & NEUROLOGY

## 2022-12-12 PROCEDURE — 65220000003 HC RM SEMIPRIVATE PSYCH

## 2022-12-12 RX ORDER — ARIPIPRAZOLE 15 MG/1
15 TABLET ORAL DAILY
Status: DISCONTINUED | OUTPATIENT
Start: 2022-12-13 | End: 2022-12-12

## 2022-12-12 RX ORDER — ARIPIPRAZOLE 15 MG/1
30 TABLET ORAL DAILY
Status: DISCONTINUED | OUTPATIENT
Start: 2022-12-13 | End: 2022-12-13

## 2022-12-12 RX ADMIN — ARIPIPRAZOLE 30 MG: 15 TABLET ORAL at 08:31

## 2022-12-12 NOTE — GROUP NOTE
PAT  GROUP DOCUMENTATION INDIVIDUAL                                                                          Group Therapy Note    Date: 12/12/2022    Group Start Time: 0900  Group End Time: 0950  Group Topic: Process Group - Inpatient    [address] [facility] GROUP DOCUMENTATION GROUP    Group Therapy Note    Focus of session was on understanding and implementing the worry tree as a way to eliminate worry about things in our lives that we currently cannot do anything about. We discussed how about 90% of our current worries are about the unimportant, the unlikely, and the unresolved. I shared with group how the worry tree can be effective for dealing with worries to let them go, even if they have to do it over and over. I had group members share a worry that is able to be resolved and one that is not at this time and how to practice letting go of that worry in order to free up emotional energy and space in our minds. Group members were given an assignment to let go of one worry this week that is resolvable or one that is not. Attendance: Attended    Patient's Goal:      Verbalizes anger, guilt, and other feelings in a constructive manor           Interventions/techniques: Informed and Reinforced    Follows Directions: Followed directions    Interactions: Interacted appropriately    Mental Status: Flat    Behavior/appearance: Cooperative    Goals Achieved: Able to listen to others, Discussed coping, Discussed safety plan, and Identified medication adherence strategies      Additional Notes:  Pt attended group but remained quiet for most of the group. She was attentive and listened to the discussion of the other members.   Pt was again expressed again regarding wanting to drink more water to flush out the medication    [employer]

## 2022-12-12 NOTE — BH NOTES
Patient has been in the day room playing cards with peers. Denies SI/HI/AVH. Is guarded at times, no delusional statements made. Talked to  on the phone. Attended and participated in community group.

## 2022-12-12 NOTE — PROGRESS NOTES
INTERVAL Hx:  Records and clinical information from the weekend were reviewed. Still appears to be delusional (Hoahaoism and paranoid), with no insight at all. Had some agitation over the WE about the Abilify dose, but has otherwise been mostly calm. Still engages on excessive Hoahaoism behavior at times--mostly praying loudly and repetitively at night. Believes she's been cured by God and doesn't need meds, but she is taking the Abilify PO at this point because of the legal order. Says it's causing bitterness and some tremulousness (not observed). Says the [place] 1943 and Abilify worked better, without SE's. Will order the former and decrease the Abilify to 15 mg daily. Slept 6 hours last night. MEDS:  Current Facility-Administered Medications   Medication Dose Route Frequency    OTHER(NON-FORMULARY) 42 mg  42 mg Oral QHS    [START ON 12/13/2022] ARIPiprazole (ABILIFY) tablet 15 mg  15 mg Oral DAILY    Or    [START ON 12/13/2022] ziprasidone (GEODON) 10 mg in sterile water (preservative free) 0.5 mL injection  10 mg IntraMUSCular DAILY    ziprasidone (GEODON) 20 mg in sterile water (preservative free) 1 mL injection  20 mg IntraMUSCular Q12H PRN    acetaminophen (TYLENOL) tablet 650 mg  650 mg Oral Q4H PRN    ibuprofen (MOTRIN) tablet 400 mg  400 mg Oral Q8H PRN    magnesium hydroxide (MILK OF MAGNESIA) 400 mg/5 mL oral suspension 30 mL  30 mL Oral DAILY PRN    alum-mag hydroxide-simeth (MYLANTA) oral suspension 30 mL  30 mL Oral Q4H PRN    traZODone (DESYREL) tablet 50 mg  50 mg Oral QHS PRN    hydrOXYzine pamoate (VISTARIL) capsule 50 mg  50 mg Oral TID PRN       VITALS: Patient Vitals for the past 12 hrs:   Temp Pulse Resp BP SpO2   12/12/22 0729 97.6 °F (36.4 °C) 92 18 134/69 99 %         LABS: .No results found for this or any previous visit (from the past 24 hour(s)).     MSE:   Appearance: casually dressed; adequately groomed  Behavior: episodes of agitation  Motor: normal  Mood/Affect: irritable at times; very restricted  Thought Process: mostly organized but can derail  Thought Content: +Restorationism and paranoid delusions; denies SI/HI  Perceptions: likely +AH's  Insight/Judgment: nil    ASSESSMENT:   1.  Schizoaffective disorder, bipolar type (F25.0)    PLAN:  1. Decrease the Abilify to 15 mg daily. 2.  Add Caplyta at 42 mg QHS. 3.  Continue the Geodon at 10 mg IM if she refuses the Abilify. 4.  ELOS: 6-12 days, depending on her response and clinical course. ADDENDUM: Spoke with  and he provided further info re: her Rx history. Tiki Becker may have contributed to her frankie resurfacing. Will go back to taking 30 mg of Abilify alone. Other options include retrying Lithium, Saphris, Geodon, or an antiseizure med. I certify that the inpatient psychiatric hospital services furnished since the previous certification/re-certification were, and continue to be, medically necessary for treatment which could reasonably be expected to improve the patient's condition and/or for diagnostic study. This patient continues to need, on a daily basis, active treatment furnished directly by or under the supervision of inpatient psychiatric personnel.

## 2022-12-12 NOTE — BH NOTES
Patient has been quiet this shift. Alert and oriented x3. Mood depressed. Hyper Zoroastrian. Observed kneeling next to bed praying at any time of the night. Speech soft. Paranoid/suspicious. Denied pain. Anxious/restless but reluctant to take medications. Conversation given in order to continue to establish trust. No SI/HI/AVH. Rested in bed with eyes closed for 6 hours.

## 2022-12-12 NOTE — BH NOTES
Patient remains on the unit. She is compliant with her medications, although she feels she has been cured by GOD. Another medication will be ordered as patient agrees it helped her in the past. She is doing her ADL care, has had some periods of agitation and has been irritable at times. Her thoughts are mostly organized but can derail. She continues to have Oriental orthodox and paranoid delusions. She is connected with [facility] [id number] for outpatient treatment and plans on returning back home with her .

## 2022-12-12 NOTE — PROGRESS NOTES
12/11/22 2029 2000 [facility]   Attendance Attended   Number of participants 3   Time in 2000   Time out 2020   Total Time 20   MTP problem Depression   Follows directions Followed directions   Interactions Interacted appropriately   Mental Status Depressed; Worried   Behavior/appearance Attentive   Suicide Risk Factors no thoughts of hurting herself nor others

## 2022-12-12 NOTE — BH NOTES
Affect blunted/restricted, mood labile. Alert and oriented x 4 with disorganized thoughts. Denies pain. No SI/HI/AVH but having positive internal stimuli. Patient remains paranoid and delusional with guarded behavior. Patient speech loud at times. Patient hyper-Yazidi. Patient agitated and argumentative toward staff. Medication compliant and no PRN's given. Appetite good eats 100% of all meals. Patient attended and engaged in groups with prompting. Patient in no apparent distress. Vitals signs within normal limits. Patient wrote a letter to \"Yunier Mcneal. \"

## 2022-12-12 NOTE — BH NOTES
Shift change report given to  Jerardo Salgado RN. Re; SBAR, medications, and behavior.    HELLER fall risk score; 1

## 2022-12-12 NOTE — PROGRESS NOTES
Problem: Depressed Mood (Adult/Pediatric)  Goal: *STG: Participates in treatment plan  Outcome: Progressing Towards Goal  Goal: *STG: Attends activities and groups  Outcome: Progressing Towards Goal  Goal: *STG: Remains safe in hospital  Outcome: Progressing Towards Goal  Goal: *STG: Complies with medication therapy  Outcome: Progressing Towards Goal     Problem: Depressed Mood (Adult/Pediatric)  Goal: *LTG: Returns to previous level of functioning and participates with after care plan  Outcome: Not Met

## 2022-12-12 NOTE — BH NOTES
Behavioral Health Interdisciplinary Rounds     Patient Name: Paul García  Age: 62 y.o. Room/Bed:  232/02  Primary Diagnosis: Schizoaffective disorder, bipolar type ([facility]   Admission Status: Involuntary Commitment and Forced Medication Order     Readmission within 30 days: no  Power of  in place: no  Patient requires a blocked bed: no          Reason for blocked bed:     VTE Prophylaxis: Not indicated    Mobility needs/Fall risk: yes  Flu Vaccine : no   Nutritional Plan: no  Consults: no         Labs/Testing due today?: no    Sleep hours: 6       Participation in Care/Groups:  yes  Medication Compliant?: Yes  PRNS (last 24 hours): None    Restraints (last 24 hours):  no     CIWA (range last 24 hours):     COWS (range last 24 hours):      Alcohol screening (AUDIT) completed -   AUDIT Score: 2     If applicable, date SBIRT discussed in treatment team AND documented:   AUDIT Screen Score: AUDIT Score: 2      Document Brief Intervention (corresponds directly with the 5 A's, Ask, Advise, Assess, Assist, and Arrange): At- Risk Patients (Score 7-15 for women; 8-15 for men)  Discuss concern patient is drinking at unhealthy levels known to increase risk of alcohol-related health problems. Is Patient ready to commit to change? If No:  Encourage reflection  Discuss short term and long term health risks of consuming alcohol  Barriers to change  Reaffirm willingness to help / Educational materials provided  If Yes:  Set goal  Plan  Educational materials provided    Harmful use or Dependence (Score 16 or greater)  Discuss short term and long term health risks of consuming alcohol  Recommendations  Negotiate drinking goal  Recommend addiction specialist/center  Arrange follow-up appointments.     Tobacco - patient is a smoker: Have You Used Tobacco in the Past 30 Days: No  Illegal Drugs use: Have You Used Any Illegal Substances Over the Past 12 Months: No    24 hour chart check complete: yes     Patient goal(s) for today: has not set a goal  Treatment team focus/goals: Verbalizes anger, guilt, and other feelings in a constructive manor  Progress note delusional (Sikhism and paranoid), with no insight at all.she is taking the Abilify PO at this point because of the legal order. Says it's causing bitterness and some tremulousness Says the Caplyta and Abilify worked better, without SE's. Decrease the Abilify to 15 mg daily. Add Caplyta at 42 mg QHS.     LOS:  6  Expected LOS: 6-12    Financial concerns/prescription coverage:  no  Family contact: no, Dr. Sheila Cabrera has spoke to       Family requesting physician contact today:  no  Discharge plan: home  Access to weapons : no        Outpatient provider(s): Alexia Luna  Patient's preferred phone number for follow up call : [phone]     Participating treatment team members: Ben Doran, Leanna Saldana, Janee Pallas, Dr. Sheila Cabrera (assigned SW), Leanna Saldana

## 2022-12-12 NOTE — PROGRESS NOTES
12/12/22 [facility] meeting   Attendance Attended   Number of participants 2   Time in 1030   Time out 1100   Total Time 30   MTP problem Depression   Interventions/techniques Supported   Follows directions Followed directions   Interactions Interacted appropriately   Mental Status Calm   Behavior/appearance Cooperative; Motivated   Suicide Risk Factors STATES THAT SHE HAS NO THOUGHTS OF HARMING HERSELF OR OTHERS   Suicide Protective Factors Denies intent   Goals Achieved Able to engage in interactions; Able to listen to others; Able to give feedback to another    PT STATES SHE HAS NO PAIN , ANXIETY, OR DEPRESSION         Lita Grimm CNA

## 2022-12-12 NOTE — BH NOTES
Shift change report given to [facility] KaahALEC SHARP re: SBAR, medications, and behavior.   Anila fall risk score:  1

## 2022-12-12 NOTE — PROGRESS NOTES
Problem: Depressed Mood (Adult/Pediatric)  Goal: *STG: Verbalizes anger, guilt, and other feelings in a constructive manor  Outcome: Progressing Towards Goal     Problem: Depressed Mood (Adult/Pediatric)  Goal: *STG: Attends activities and groups  Outcome: Progressing Towards Goal     Problem: Paranoid Ideation  Goal: *LTG: Interact with others without defensiveness or anger  Outcome: Progressing Towards Goal     Problem: Paranoid Ideation  Goal: *STG:Report interacting socially without fear or suspicion  Outcome: Progressing Towards Goal

## 2022-12-13 PROCEDURE — 65220000003 HC RM SEMIPRIVATE PSYCH

## 2022-12-13 PROCEDURE — 99231 SBSQ HOSP IP/OBS SF/LOW 25: CPT | Performed by: PSYCHIATRY & NEUROLOGY

## 2022-12-13 PROCEDURE — 74011250637 HC RX REV CODE- 250/637: Performed by: PSYCHIATRY & NEUROLOGY

## 2022-12-13 RX ORDER — ARIPIPRAZOLE 15 MG/1
30 TABLET ORAL DAILY
Status: DISCONTINUED | OUTPATIENT
Start: 2022-12-14 | End: 2022-12-14

## 2022-12-13 RX ADMIN — ARIPIPRAZOLE 30 MG: 15 TABLET ORAL at 08:24

## 2022-12-13 RX ADMIN — HYDROXYZINE PAMOATE 50 MG: 50 CAPSULE ORAL at 08:44

## 2022-12-13 NOTE — PROGRESS NOTES
INTERVAL Hx:  Records and clinical information were reviewed. Still very delusional (Lutheran/grandiose and paranoid), with no insight at all. Wrote a letter to [employer] trying to get his  to help her. I tried to explain the reasoning for not restarting the Caplyta and staying with the Abilify at a full dose, but she essentially fired me. She then began exhibiting supposed SE's for the Abilify--darting her tongue in and out very rapidly, shaking somewhat off and on. These were clearly not c/w true EPSE's and she never had these behaviors before (here).  had indicated that she hasn't ever had a notably good response to meds before. Will try to stay with the Abilify (single agent) for now, but may have to switch meds if she continues to act out re: the Abilify. Slept 5.5 hours last night. MEDS:  Current Facility-Administered Medications   Medication Dose Route Frequency    [START ON 12/14/2022] ziprasidone (GEODON) 20 mg in sterile water (preservative free) 1 mL injection  20 mg IntraMUSCular DAILY    Or    [START ON 12/14/2022] ARIPiprazole (ABILIFY) tablet 30 mg  30 mg Oral DAILY    ziprasidone (GEODON) 20 mg in sterile water (preservative free) 1 mL injection  20 mg IntraMUSCular Q12H PRN    acetaminophen (TYLENOL) tablet 650 mg  650 mg Oral Q4H PRN    ibuprofen (MOTRIN) tablet 400 mg  400 mg Oral Q8H PRN    magnesium hydroxide (MILK OF MAGNESIA) 400 mg/5 mL oral suspension 30 mL  30 mL Oral DAILY PRN    alum-mag hydroxide-simeth (MYLANTA) oral suspension 30 mL  30 mL Oral Q4H PRN    traZODone (DESYREL) tablet 50 mg  50 mg Oral QHS PRN    hydrOXYzine pamoate (VISTARIL) capsule 50 mg  50 mg Oral TID PRN       VITALS: Patient Vitals for the past 12 hrs:   Temp Pulse Resp BP SpO2   12/13/22 0723 98 °F (36.7 °C) 96 16 120/69 100 %         LABS: .No results found for this or any previous visit (from the past 24 hour(s)).     MSE:   Appearance: casually dressed; adequately groomed  Behavior: episodes of agitation; feigning SE's this AM  Motor: normal  Mood/Affect: irritable; very restricted  Thought Process: mostly organized but can derail  Thought Content: +Anglican and paranoid delusions; denies SI/HI  Perceptions: likely +AH's  Insight/Judgment: nil    ASSESSMENT:   1.  Schizoaffective disorder, bipolar type (F25.0)    PLAN:  1. Continue the Abilify at 30 mg daily. 2.  May need to switch to Geodon, Saphris, Fanapt, etc.  3.  Continue the Geodon at 20 mg IM if she refuses the Abilify. 4.  ELOS: 6-12 days, depending on her response and clinical course.

## 2022-12-13 NOTE — BH NOTES
Patient distracted and guarded. Paranoid and suspicious when staff attempts to engage in conversation. Constricted/labile affect. Mood anxious/depressed. Speech soft. Due to her paranoid delusions patient is reluctant to take medications and thinks they are being given to her to kill her. Education and one on one given to attempt to ease mood. Vitals stable. Began shaking arms after morning dose of Abilify, stating it was from the medicine. When distracted by staff with conversation the shaking would stop. Dr. Ivon Montgomery in the dayroom to observe. No new orders/findings. Given prn Vistaril for anxiety. Education given on medication. Helpful effects. Did participate in group. Appetite good.      PRN Medication Documentation    Specific patient behavior that led to the need for PRN medication: anxiety  Staff interventions attempted prior to PRN being given: one on one, distraction  PRN medication given: Vistaril 50 mg by mouth at 0844  Patient responsiveness/effectiveness of PRN medication: helpful

## 2022-12-13 NOTE — PROGRESS NOTES
12/12/22 [address] [facility] [address] meeting   Attendance Attended   Number of participants 3   Time in 2000   Time out 2030   Total Time 30   MTP problem Pain   Interventions/techniques Supported   Follows directions Followed directions   Interactions Interacted appropriately   Mental Status Calm   Behavior/appearance Cooperative   Suicide Risk Factors No thoughts of self harm   Suicide Protective Factors Denies intent   Goals Achieved Able to engage in interactions       She has no anxiety or depression but has lower back pain at a 3.

## 2022-12-13 NOTE — PROGRESS NOTES
Problem: Falls - Risk of  Goal: *Absence of Falls  Description: Document Meena Marking Fall Risk and appropriate interventions in the flowsheet.   Outcome: Progressing Towards Goal  Note: Fall Risk Interventions:            Medication Interventions: Teach patient to arise slowly                   Problem: Depressed Mood (Adult/Pediatric)  Goal: *STG: Participates in treatment plan  Outcome: Progressing Towards Goal  Goal: *STG: Attends activities and groups  Outcome: Progressing Towards Goal  Goal: *STG: Demonstrates reduction in symptoms and increase in insight into coping skills/future focused  Outcome: Progressing Towards Goal  Goal: *STG: Remains safe in hospital  Outcome: Progressing Towards Goal     Problem: Paranoid Ideation  Goal: *LTG: Interact with others without defensiveness or anger  Outcome: Progressing Towards Goal

## 2022-12-13 NOTE — ROUTINE PROCESS
Shift change report given to Tia Nicholas, re: BENNIEAR. Medications, and behavior.   Anila Fall Risk Score (1)

## 2022-12-13 NOTE — BH NOTES
Affect blunted, mood labile. Pt attended and participated in scheduled group activities. Pt was minimally social with staff and peers but was a bit intrusive at times. Pt ate 100% of snack. Pt vital signs stable with complaints of some lower back stiffness but refused any medication for it. Pt denies SI/HI/AVH but is still very paranoid and delusional.  Pt was talking about being friends with president Cam Kang, and even wrote him a letter stating she was being held at the hospital against her will. Pt continues to be very hyper religous, praying very loudly at times during the night. Pt has no scheduled medications for evening/night and did not request a PRN. Pt is in no apparent distress at this time. Pt rested in her bed with her eyes closed for 5.5 hours.

## 2022-12-13 NOTE — PROGRESS NOTES
Problem: Falls - Risk of  Goal: *Absence of Falls  Description: Document Cinthya Esposito Fall Risk and appropriate interventions in the flowsheet.   Outcome: Progressing Towards Goal  Note: Fall Risk Interventions:            Medication Interventions: Teach patient to arise slowly                   Problem: Depressed Mood (Adult/Pediatric)  Goal: *STG: Attends activities and groups  Outcome: Progressing Towards Goal  Goal: *STG: Remains safe in hospital  Outcome: Progressing Towards Goal  Goal: *STG: Complies with medication therapy  Outcome: Progressing Towards Goal

## 2022-12-13 NOTE — BH NOTES
Shift change report given to  Tomasz Delcid RN. Re; SBAR, medications, and behavior.    HELLER fall risk score; 1

## 2022-12-13 NOTE — PROGRESS NOTES
Patient Goal: To talk to the police about her medication. Patient appetite was fair, physical pain (R) finger pain level 2, no suicidal or self harm thoughts. Patient stated that she has no depression or anxiety. 12/13/22 1000 [facility] meeting   Attendance Attended   Number of participants 4   Time in 1030   Time out 1100   Total Time 30   MTP problem Fall risk;Paranoid ideation   Interventions/techniques Provided feedback   Follows directions Followed directions   Interactions Interacted appropriately   Mental Status Anxious;Preoccupied   Behavior/appearance Cooperative   Suicide Risk Factors No suicidal or self harm thoughts   Goals Achieved Able to receive feedback; Able to listen to others; Able to give feedback to another

## 2022-12-14 PROCEDURE — 99231 SBSQ HOSP IP/OBS SF/LOW 25: CPT | Performed by: PSYCHIATRY & NEUROLOGY

## 2022-12-14 PROCEDURE — 65220000003 HC RM SEMIPRIVATE PSYCH

## 2022-12-14 PROCEDURE — 74011250637 HC RX REV CODE- 250/637: Performed by: PSYCHIATRY & NEUROLOGY

## 2022-12-14 RX ORDER — ZIPRASIDONE HYDROCHLORIDE 20 MG/1
40 CAPSULE ORAL 2 TIMES DAILY WITH MEALS
Status: DISCONTINUED | OUTPATIENT
Start: 2022-12-14 | End: 2022-12-15

## 2022-12-14 RX ADMIN — ZIPRASIDONE HYDROCHLORIDE 40 MG: 20 CAPSULE ORAL at 17:31

## 2022-12-14 RX ADMIN — ZIPRASIDONE HYDROCHLORIDE 40 MG: 20 CAPSULE ORAL at 09:07

## 2022-12-14 NOTE — GROUP NOTE
PAT  GROUP DOCUMENTATION INDIVIDUAL                                                                          Group Therapy Note    Date: 12/14/2022    Group Start Time: 0900  Group End Time: 0950  Group Topic: Process Group - Inpatient    [address] OP    Chika Jarvis    PAT  GROUP DOCUMENTATION GROUP    Group Therapy Note    Focus of session was information on the difference between a victim mentality, an survivor mentality, and a thriver mentality. We spoke about how attitude makes a lot of difference in what we do and how we choose to cope in those times when we are thinking negative thoughts that lead to a victim mentality. I shared that victim thinking and behavior includes negative self talk, isolating behavior, overwhelmed by the past, believes everyone else is better, and places own needs last.  A survivor mentality and behavior includes seeing oneself as wounded but healing, knows that they deserve to seek help, not afraid to tell their story, and are learning healthy needs. A thriver mentality includes gratitude, proud of self care, living in the present, and protects self from unsafe others. We spoke about what actions can be taken or what thoughts could be changed to develop a thriver mentality and being.          Attendance: {[facility] ATTENDANCE:70308}    Patient's Goal:  ***    Interventions/techniques: {[facility] GROUP DOC INTERVENTIONS/TECHNIQUES:73237}    Follows Directions: {[facility] FOLLOWS DIRECTION:35431}    Interactions: {[facility] INTERACTIONS:73444}    Mental Status: {[facility] GROUP DOC MENTAL STATUS:10969}    Behavior/appearance: {[facility] GROUP DOC BEHAVIOR/APPEARANCE:06152}    Goals Achieved: {[facility] GROUP DOC GOALS ACHIEVED:93523}      Additional Notes:  ***    Liane Moser

## 2022-12-14 NOTE — BH NOTES
Shift change report given to  Bridger Li RN. Re; SBAR, medications, and behavior.    HELLER fall risk score; 1

## 2022-12-14 NOTE — PROGRESS NOTES
Patient Goal: To have a relaxing day. Patient appetite was good, physical pain lower back, level 2, no suicidal or self harm thoughts. Patient stated that he has no depression or anxiety. 12/14/22  Zee Jiménez meeting   Attendance Attended   Number of participants 3   Time in 1030   Time out 1100   Total Time 30   MTP problem Depression; Fall risk   Interventions/techniques Provided feedback   Follows directions Followed directions   Interactions Interacted appropriately   Mental Status Depressed; Anxious   Behavior/appearance Cooperative   Suicide Risk Factors No suicidal or self harm thoughts   Goals Achieved Able to listen to others; Able to receive feedback; Able to give feedback to another

## 2022-12-14 NOTE — BH NOTES
Behavioral Health Interdisciplinary Rounds     Patient Name: Gracie Noe  Age: 62 y.o. Room/Bed:  232/02  Primary Diagnosis: Schizoaffective disorder, bipolar type ([address]   Admission Status: Involuntary Commitment and Forced Medication Order     Readmission within 30 days: no  Power of  in place: no  Patient requires a blocked bed: no          Reason for blocked bed:     VTE Prophylaxis: Not indicated    Mobility needs/Fall risk: yes  Flu Vaccine : no   Nutritional Plan: no  Consults: no         Labs/Testing due today?: no    Sleep hours: 7.5       Participation in Care/Groups:  yes  Medication Compliant?: Yes  PRNS (last 24 hours): Antianxiety    Restraints (last 24 hours):  no     CIWA (range last 24 hours):     COWS (range last 24 hours):      Alcohol screening (AUDIT) completed -   AUDIT Score: 2     If applicable, date SBIRT discussed in treatment team AND documented:   AUDIT Screen Score: AUDIT Score: 2      Document Brief Intervention (corresponds directly with the 5 A's, Ask, Advise, Assess, Assist, and Arrange): At- Risk Patients (Score 7-15 for women; 8-15 for men)  Discuss concern patient is drinking at unhealthy levels known to increase risk of alcohol-related health problems. Is Patient ready to commit to change? If No:  Encourage reflection  Discuss short term and long term health risks of consuming alcohol  Barriers to change  Reaffirm willingness to help / Educational materials provided  If Yes:  Set goal  Plan  Educational materials provided    Harmful use or Dependence (Score 16 or greater)  Discuss short term and long term health risks of consuming alcohol  Recommendations  Negotiate drinking goal  Recommend addiction specialist/center  Arrange follow-up appointments.     Tobacco - patient is a smoker: Have You Used Tobacco in the Past 30 Days: No  Illegal Drugs use: Have You Used Any Illegal Substances Over the Past 12 Months: No    24 hour chart check complete: yes Patient goal(s) for today: To have a relaxing day  Treatment team focus/goals: Show more trust in others by speaking positively of them & reporting comfort in socializing  Progress note No appreciable change--still very delusional (Episcopalian/grandiose and paranoid) with no insight at all. Says the Abilify is \"poisoning\" her, so she's drinking lots of water to flush it out.agreed to switch the Abilify to Geodon, starting with 40 mg daily.      LOS:  8  Expected LOS: 7-12    Financial concerns/prescription coverage:  no  Family contact: no      Family requesting physician contact today:  no  Discharge plan: home with   Access to weapons : no        Outpatient provider(s): Alexia Luna  Patient's preferred phone number for follow up call : [phone]     Participating treatment team members: Selene Lopez, Dr. Miller DELA CRUZ, Anthony Kline

## 2022-12-14 NOTE — PROGRESS NOTES
Patient said she didn't have a good day cause she don't think her meds are right .  Patient said she has no anxiety,no depression , no pain , and no SI    12/13/22 2142   Group Therapy   Group Community meeting   Attendance Attended   Number of participants 4   Time in 2000   Time out 2030   Total Time 30   MTP problem Depression   Interventions/techniques Provided feedback   Follows directions Followed directions   Interactions Interacted appropriately   Mental Status Anxious;Preoccupied   Behavior/appearance Cooperative   Suicide Risk Factors No SI   Suicide Protective Factors Denies intent

## 2022-12-14 NOTE — BH NOTES
Patient alert and oriented x3 . Defensive and guarded. Labile/blunted affect. Anxious and depressed mood. Paranoid/religous delusions. Untrusting of staff and her medications. Patient states she does not need it and refers to the Bible for all mental healing. One on one given and encouragement needed for cooperation in medication compliance. MD, Dr. Mack Members made medication changes and patient did successfully take medications by mouth. Denies pain. No SI/HI/AVH. Has been out of room meals for group. Improving on her interactions with others, participates in group/activities and expressing concerns but remains suspicious of medications. Staff will continue to educate and establish trust with patient.

## 2022-12-14 NOTE — BH NOTES
Affect blunted,mood labile. Pt attended and participated in scheduled group activities. Pt was social with staff and peers. Pt's heart rate remains elevated but doctor is aware, the rest of her vital signs are with in normal limits. Pt has no complaints of pain. Pt ate 100% of snack. Pt denies SI/HI/AVH but is still very paranoid and hyper religous. Pt has no scheduled medications for evening/night and did not request a PRN. Pt is in no apparent distress at this time and made no overtly delusional statements. Pt rested in her bed with her eyes closed for 7.5 hours.

## 2022-12-14 NOTE — ROUTINE PROCESS
Shift change report given to Bessie Ferris, re: SBAR. Medications, and behavior.   Anila Fall Risk Score (1)

## 2022-12-14 NOTE — PROGRESS NOTES
Problem: Falls - Risk of  Goal: *Absence of Falls  Description: Document Tasneem Counts Fall Risk and appropriate interventions in the flowsheet.   Outcome: Progressing Towards Goal  Note: Fall Risk Interventions:            Medication Interventions: Teach patient to arise slowly                   Problem: Depressed Mood (Adult/Pediatric)  Goal: *STG: Participates in treatment plan  Outcome: Progressing Towards Goal  Goal: *STG: Attends activities and groups  Outcome: Progressing Towards Goal  Goal: *STG: Remains safe in hospital  Outcome: Progressing Towards Goal     Problem: Paranoid Ideation  Goal: *LTG: Show more trust in others by speaking positively of them & reporting comfort in socializing  Outcome: Progressing Towards Goal     Problem: Psychosis  Goal: *STG: Decreased hallucinations  Outcome: Progressing Towards Goal  Goal: *STG: Decreased delusional thinking  Outcome: Progressing Towards Goal  Goal: *STG: Remains safe in hospital  Outcome: Progressing Towards Goal     Problem: Aggression and Hostility (Behavioral Health)  Goal: *Reduce intensity and frequency of aggressive behaviors and verbalizations, treating others with respect  Outcome: Progressing Towards Goal  Goal: *Avoid situations that produce feelings of frustration, embarrassment, anxiety, or impatience  Outcome: Progressing Towards Goal  Goal: *Begin to show limited social functioning by responding appropriately to friendly encounters  Outcome: Progressing Towards Goal

## 2022-12-14 NOTE — PROGRESS NOTES
Problem: Falls - Risk of  Goal: *Absence of Falls  Description: Document Meena Marking Fall Risk and appropriate interventions in the flowsheet.   Outcome: Progressing Towards Goal  Note: Fall Risk Interventions:            Medication Interventions: Teach patient to arise slowly                   Problem: Depressed Mood (Adult/Pediatric)  Goal: *STG: Participates in treatment plan  Outcome: Progressing Towards Goal  Goal: *STG: Attends activities and groups  Outcome: Progressing Towards Goal

## 2022-12-14 NOTE — PROGRESS NOTES
INTERVAL Hx:  Records and clinical information were reviewed. No appreciable change--still very delusional (Oriental orthodox/grandiose and paranoid) with no insight at all. Says the Abilify is \"poisoning\" her, so she's drinking lots of water to flush it out. Refuses to consider any med changes, and refused to believe anything I said to her. She then stated I was lying and got a staff member to [facility]" my lies, but she seemed to accept it more when the Nurse agreed with me that she's been paranoid, etc. She then agreed to switch the Abilify to Geodon, starting with 40 mg daily. Slept 7.5 hours last night. MEDS:  Current Facility-Administered Medications   Medication Dose Route Frequency    ziprasidone (GEODON) capsule 40 mg  40 mg Oral BID WITH MEALS    Or    ziprasidone (GEODON) 10 mg in sterile water (preservative free) 0.5 mL injection  10 mg IntraMUSCular BID WITH MEALS    ziprasidone (GEODON) 20 mg in sterile water (preservative free) 1 mL injection  20 mg IntraMUSCular Q12H PRN    acetaminophen (TYLENOL) tablet 650 mg  650 mg Oral Q4H PRN    ibuprofen (MOTRIN) tablet 400 mg  400 mg Oral Q8H PRN    magnesium hydroxide (MILK OF MAGNESIA) 400 mg/5 mL oral suspension 30 mL  30 mL Oral DAILY PRN    alum-mag hydroxide-simeth (MYLANTA) oral suspension 30 mL  30 mL Oral Q4H PRN    traZODone (DESYREL) tablet 50 mg  50 mg Oral QHS PRN    hydrOXYzine pamoate (VISTARIL) capsule 50 mg  50 mg Oral TID PRN       VITALS: Patient Vitals for the past 12 hrs:   Temp Pulse Resp BP SpO2   12/14/22 0733 97 °F (36.1 °C) 93 18 125/71 100 %         LABS: .No results found for this or any previous visit (from the past 24 hour(s)).     MSE:   Appearance: casually dressed; adequately groomed  Behavior: episodes of agitation  Motor: normal  Mood/Affect: irritable; very restricted  Thought Process: mostly organized but can derail  Thought Content: +Oriental orthodox and paranoid delusions; denies SI/HI  Perceptions: likely +AH's  Insight/Judgment: nil    ASSESSMENT:   1.  Schizoaffective disorder, bipolar type (F25.0)    PLAN:  1.  D/C the Abilify. 2.  Start Geodon at 40 mg BID with meals. 3.  Continue the Geodon at 10 mg IM if she refuses the PO med. 4. ELOS: 7-12 days, possibly longer given her limited response so far.

## 2022-12-15 LAB
CHOLEST SERPL-MCNC: 178 MG/DL
EST. AVERAGE GLUCOSE BLD GHB EST-MCNC: 120 MG/DL
HBA1C MFR BLD: 5.8 % (ref 4–5.6)
HDLC SERPL-MCNC: 85 MG/DL
HDLC SERPL: 2.1 {RATIO} (ref 0–5)
LDLC SERPL CALC-MCNC: 78.8 MG/DL (ref 0–100)
TRIGL SERPL-MCNC: 71 MG/DL (ref ?–150)
VLDLC SERPL CALC-MCNC: 14.2 MG/DL

## 2022-12-15 PROCEDURE — 83036 HEMOGLOBIN GLYCOSYLATED A1C: CPT

## 2022-12-15 PROCEDURE — 65220000003 HC RM SEMIPRIVATE PSYCH

## 2022-12-15 PROCEDURE — 99231 SBSQ HOSP IP/OBS SF/LOW 25: CPT | Performed by: PSYCHIATRY & NEUROLOGY

## 2022-12-15 PROCEDURE — 74011250637 HC RX REV CODE- 250/637: Performed by: PSYCHIATRY & NEUROLOGY

## 2022-12-15 PROCEDURE — 80061 LIPID PANEL: CPT

## 2022-12-15 PROCEDURE — 36415 COLL VENOUS BLD VENIPUNCTURE: CPT

## 2022-12-15 RX ORDER — ZIPRASIDONE HYDROCHLORIDE 60 MG/1
60 CAPSULE ORAL 2 TIMES DAILY WITH MEALS
Status: DISCONTINUED | OUTPATIENT
Start: 2022-12-15 | End: 2022-12-19

## 2022-12-15 RX ADMIN — ZIPRASIDONE HYDROCHLORIDE 60 MG: 60 CAPSULE ORAL at 17:50

## 2022-12-15 RX ADMIN — ZIPRASIDONE HYDROCHLORIDE 40 MG: 20 CAPSULE ORAL at 08:23

## 2022-12-15 NOTE — ROUTINE PROCESS
Shift change report given to Simon Irving RN; re:SBAR, medications, and behavior. Anila Fall Risk Score 1.

## 2022-12-15 NOTE — PROGRESS NOTES
INTERVAL Hx:  Records and clinical information were reviewed. No appreciable change--still delusional (Adventist/grandiose and paranoid) with no insight at all, and still believes she's been fully cured by the [facility]. Also believes she was being \"poisoned\" with the increased dose of Abilify, and a soon as I tried to explain any alternative view, she immediately stops the interview and says I'm lying, etc. No SE's noted or reported so far with the Geodon. Doesn't seem to be as distressed about it so far. Will increase the dose to 60 mg BID as planned. Slept 7 hours last night.     MEDS:  Current Facility-Administered Medications   Medication Dose Route Frequency    ziprasidone hcl (GEODON) capsule 60 mg  60 mg Oral BID WITH MEALS    Or    ziprasidone (GEODON) 10 mg in sterile water (preservative free) 0.5 mL injection  10 mg IntraMUSCular BID WITH MEALS    ziprasidone (GEODON) 20 mg in sterile water (preservative free) 1 mL injection  20 mg IntraMUSCular Q12H PRN    acetaminophen (TYLENOL) tablet 650 mg  650 mg Oral Q4H PRN    ibuprofen (MOTRIN) tablet 400 mg  400 mg Oral Q8H PRN    magnesium hydroxide (MILK OF MAGNESIA) 400 mg/5 mL oral suspension 30 mL  30 mL Oral DAILY PRN    alum-mag hydroxide-simeth (MYLANTA) oral suspension 30 mL  30 mL Oral Q4H PRN    traZODone (DESYREL) tablet 50 mg  50 mg Oral QHS PRN    hydrOXYzine pamoate (VISTARIL) capsule 50 mg  50 mg Oral TID PRN       VITALS: Patient Vitals for the past 12 hrs:   Temp Pulse Resp BP SpO2   12/15/22 0747 97.7 °F (36.5 °C) 84 17 136/80 100 %         LABS: .  Recent Results (from the past 24 hour(s))   LIPID PANEL    Collection Time: 12/15/22  6:10 AM   Result Value Ref Range    Cholesterol, total 178 <200 MG/DL    Triglyceride 71 <150 MG/DL    HDL Cholesterol 85 MG/DL    LDL, calculated 78.8 0 - 100 MG/DL    VLDL, calculated 14.2 MG/DL    CHOL/HDL Ratio 2.1 0.0 - 5.0     HEMOGLOBIN A1C WITH EAG    Collection Time: 12/15/22  6:10 AM   Result Value Ref Range    Hemoglobin A1c 5.8 (H) 4.0 - 5.6 %    Est. average glucose 120 mg/dL       MSE:   Appearance: casually dressed; adequately groomed  Behavior: fewer episodes of agitation  Motor: normal  Mood/Affect: irritable; restricted  Thought Process: mostly organized but can derail  Thought Content: +Synagogue and paranoid delusions; denies SI/HI  Perceptions: likely +AH's  Insight/Judgment: nil    ASSESSMENT:   1.  Schizoaffective disorder, bipolar type (F25.0)    PLAN:  1. Increase the Geodon to 60 mg BID with meals. 2.  Continue the Geodon at 10 mg IM if she refuses the PO med. 3.  ELOS: 7-12 days, possibly longer given her limited response so far and her history of refractory illness.

## 2022-12-15 NOTE — PROGRESS NOTES
Pt had no feelings of anxiety or depression. Pt goal was to go home.     12/15/22 2255 CAROLANN Cochran  meeting   Attendance Attended   Number of participants 4   Time in 1030   Time out 1045   Total Time 15   MTP problem Paranoid ideation   Interventions/techniques Informed   Follows directions Followed directions   Interactions Interacted appropriately   Mental Status Anxious   Behavior/appearance Agitated;Argumentative   Suicide Protective Factors Denies intent

## 2022-12-15 NOTE — BH NOTES
Patient remains on the unit, she continues to be paranoid and religiously preoccupied. Believes she has been cured by the [facility]. She is compliant with medications, She does believe that she was being poisoned. She has been irritable and agitated, quick to accuse staff of lying and then is dismissive. She is doing her ADL's eating and sleeping good. Participates in groups and is appropriate with her peers. She will return home and she is connected to outpatient services with Araceli Saravia.

## 2022-12-15 NOTE — MASTER TREATMENT PLAN
Master Treatment Plan Review for Alice Bartlett    Date of Admission Date Treatment Plan Reviewed Anticipated Date of Discharge Legal Status    12/6/22 12/14/22 TBD Involuntary Commitment and Forced Medication Order     Treatment & Discharge Planning Changes    Modality or Intervention Changes 12/14- Lipid panel and Hgb A1C      Psychotropic Medication Changes 12/12- Abilify 30 mg po daily if patient refuses admin Geodon 10 mg IM        12/13- Abilify 30 mg po daily if patient refuses admin Geodon 20 mg IM  12/14- D/C Abilify 30 mg po daily            - Geodon 40 mg PO BID with meals if patient refuses use              Geodon 40  MG IM      Discharge Planning or Target Date Changes ELOS: 7-12 days, possibly longer given her limited response so far   New Issues N/A       Treatment Team Signatures    I have participated in the development of this plan of treatment and agree to its implementation.     Patient Signature       Patient Printed Name Date/Time   /Therapist Signature       //Therapist Printed Name Date/Time   RN Signature       RN Printed Name Date/Time   Unit  Signature       Unit  Printed Name Date/Time   MD Signature       MD Printed Name Date/Time

## 2022-12-15 NOTE — BH NOTES
Affect constricted, mood depressed/anxious. Alert and oriented X 4. Cooperative and pleasant. Attended and participated in group. Interacted with staff and peers. Makes needs known. Ate all meals and snacks. Denied SI/HI/AVH and pain. Medication compliant. She endorses that there is no need for medication and should not be taking the medicine. Requested a second opinion and doctor. Explained to patient of her commitment and a second opinion was done at the time of the TDO hearing and commitment. She seemed to be okay after explanation with continued treatment with provider and medication. Stable but sits in the day room with a constricted flat affect.

## 2022-12-15 NOTE — GROUP NOTE
PAT  GROUP DOCUMENTATION INDIVIDUAL                                                                          Group Therapy Note    Date: 12/15/2022    Group Start Time: 0900  Group End Time: 5363  Group Topic: Primary Group    [facility]    Kika Monte    IP [address] [facility] GROUP DOCUMENTATION GROUP    Group Therapy Note    Attendees: Focus of session was on the qualities and assets that we can possess in order to have a successful recovery. We focused this session on the importance of having a secure base which means that we have our basic needs met and that we have access to and utilize help when it is necessary. We spoke about how we also have to have a sense of self and positive self esteem and we spoke about specific ways in which we have grown in self esteem through the course of this treatment. We spoke about the need and importance of supportive relationships and that we benefit greatly from having others believe in us and that we have the potential to recover. We also spoke about the importance of feeling empowered to make good decisions for ourselves and can work through problems when they arise. Attendance: Attended    Patient's Goal:      Verbalizes anger, guilt, and other feelings in a constructive manor         Interventions/techniques: Informed, Validated, Promoted peer support, and Supported    Follows Directions: Followed directions    Interactions: Interacted appropriately    Mental Status: Calm, Congruent, and Preoccupied    Behavior/appearance: Attentive, Cooperative, and Needed prompting    Goals Achieved: Able to engage in interactions, Able to manage/cope with feelings, Able to receive feedback, Able to self-disclose, Discussed coping, Identified feelings, and Identified triggers      Additional Notes:  Naveen BOB participated in group but she remains very guarded and she spoke about how she \"hopes to go home today. \"  She said she spoke to her  who gave her the impression in her mind that this is possible. She did not want to share any details of her life as that is \"private. \"     Jose Michaud

## 2022-12-15 NOTE — BH NOTES
Shift change report given to Eladia Low RN re: SBAR, medications, and behavior.   Anila fall risk score 1

## 2022-12-15 NOTE — PROGRESS NOTES
Responded to request for eucharistic ministers  Contacted  to schedule visit  Shared with patient  who had concerns w stay  Provided empathic listening and spiritual support  Advised of  Availability    [address] [place] [address]

## 2022-12-15 NOTE — PROGRESS NOTES
Problem: Falls - Risk of  Goal: *Absence of Falls  Description: Document Lagrange Fail Fall Risk and appropriate interventions in the flowsheet.   Outcome: Progressing Towards Goal  Note: Fall Risk Interventions:            Medication Interventions: Teach patient to arise slowly         History of Falls Interventions: Door open when patient unattended         Problem: Patient Education: Go to Patient Education Activity  Goal: Patient/Family Education  Outcome: Progressing Towards Goal

## 2022-12-15 NOTE — PROGRESS NOTES
Patient said she had a so so day . Patient said she has lower back pain 2/10 . Patient said she has no anxiety , no depression , and no SI    12/14/22 2102   Group Therapy   Group Community meeting   Attendance Attended   Number of participants 5   Time in 2000   Time out 2030   Total Time 30   MTP problem Depression; Fall risk;Pain   Interventions/techniques Provided feedback   Follows directions Followed directions   Interactions Interacted appropriately   Mental Status Depressed   Behavior/appearance Cooperative   Suicide Risk Factors No SI   Suicide Protective Factors Denies intent   Goals Achieved Able to listen to others

## 2022-12-15 NOTE — BH NOTES
Affect constricted/labile, mood labile. Alert and Oriented X 4. Guarded and distracted. Attended and participated  group. . Interacted with staff and peers. Makes needs known. Ate snacks. Denied SI/HI/AVH and pain. No scheduled medications during shift. No prn meds requested or admin. Stable with no s/s of distress.  Laid in bed with eyes closed for 7 hours

## 2022-12-16 PROCEDURE — 99232 SBSQ HOSP IP/OBS MODERATE 35: CPT | Performed by: PSYCHIATRY & NEUROLOGY

## 2022-12-16 PROCEDURE — 74011250637 HC RX REV CODE- 250/637: Performed by: PSYCHIATRY & NEUROLOGY

## 2022-12-16 PROCEDURE — 65220000003 HC RM SEMIPRIVATE PSYCH

## 2022-12-16 RX ADMIN — ZIPRASIDONE HYDROCHLORIDE 60 MG: 60 CAPSULE ORAL at 08:33

## 2022-12-16 RX ADMIN — ZIPRASIDONE HYDROCHLORIDE 60 MG: 60 CAPSULE ORAL at 17:31

## 2022-12-16 NOTE — BH NOTES
Shift change report given to [facility] re: SBAR, medications, and behavior.   Anila fall risk score:  1

## 2022-12-16 NOTE — PROGRESS NOTES
Problem: Falls - Risk of  Goal: *Absence of Falls  Description: Document Munira Skill Fall Risk and appropriate interventions in the flowsheet.   Outcome: Progressing Towards Goal  Note: Fall Risk Interventions:            Medication Interventions: Teach patient to arise slowly         History of Falls Interventions: Door open when patient unattended         Problem: Patient Education: Go to Patient Education Activity  Goal: Patient/Family Education  Outcome: Progressing Towards Goal

## 2022-12-16 NOTE — PROGRESS NOTES
Patient did not come out for group or snack    12/15/22 Chase Alvarado meeting   Attendance Did not attend   Number of participants 2   Time in 2000   Time out 2020   Total Time 20

## 2022-12-16 NOTE — BH NOTES
Affect constricted/labile, mood labile. Alert and Oriented X 4. Guarded and defensive. Refused to attend group. . Interacted with staff and peers. Makes needs known. Denied SI/HI/VH and pain. Positive for internal stimuli. Trula Blew +AH. No scheduled medications during shift. No prn meds requested or admin. Stable with no s/s of distress.  Laid in bed with eyes closed for 7 hours

## 2022-12-16 NOTE — GROUP NOTE
[facility] GROUP DOCUMENTATION INDIVIDUAL                                                                          Group Therapy Note    Date: 12/16/2022    Group Start Time: 0900  Group End Time: 3106  Group Topic: Process Group - Inpatient    [address] [facility] OP    Harsh Jarvis Failing    IP 1150 [facility] GROUP DOCUMENTATION GROUP  Focus of session was on developing self monitoring skills to help prevent relapses and develop skills to be better able to manage circumstances and triggers to work through periods of depression, anxiety, or frankie. We spoke about why it is important to manage and monitor symptoms as well as circumstances. We spoke monitoring mood and were we feeling low or high so we can see if we can notice any mood fluctuations and what they may be about. We spoke about symptom monitoring as well and when we need to take some action and/or get some intervention. We spoke about how to plan for early intervention and be specific about what to do when symptoms are present. We spoke about what we can do to develop specific plans of action for relapse prevention and how others around us can help       Attendance: Attended    Patient's Goal:      Verbalizes anger, guilt, and other feelings in a constructive manor           Interventions/techniques: Informed, Validated, Reinforced, and Supported    Follows Directions: Followed directions    Interactions: Interacted appropriately    Mental Status: Calm and Flat    Behavior/appearance: Attentive, Needed prompting, Poor eye contact, and Withdrawn/quiet    Goals Achieved: Able to engage in interactions and Able to listen to others      Additional Notes:  Pt listened to the group activity and discussion. She engaged only when prompted but was appropriate in her speech and interactions. She stated that she felt better today but is still concerned about her medication. She was quiet for the majority of the group.      Sheila Theodore

## 2022-12-16 NOTE — BH NOTES
Patient affect blunted/constricted/labile. Patient alert and oriented x 4 with some thought blocking. Denies pain. No SI/HI/AVH. Patient remains paranoid,delusional, and hyper-Yarsani. Patient cooperative and easily irritable. Patient argumentative with staff and continue call them a \"liar\" when they don't agree with her. Medication compliant and no PRN's requested or given. Appetite good eats 100% of all meals plus snacks. Patient attended and engaged in groups with prompting. Patient in no apparent distress. Vitals signs within normal limits.

## 2022-12-16 NOTE — PROGRESS NOTES
Problem: Depressed Mood (Adult/Pediatric)  Goal: *STG: Participates in treatment plan  Outcome: Progressing Towards Goal  Goal: *STG: Attends activities and groups  Outcome: Progressing Towards Goal  Goal: *STG: Remains safe in hospital  Outcome: Progressing Towards Goal  Goal: *STG: Complies with medication therapy  Outcome: Progressing Towards Goal     Problem: Patient Education: Go to Patient Education Activity  Goal: Patient/Family Education  Outcome: Not Met

## 2022-12-16 NOTE — BH NOTES
Shift change report given to Arben Oliveros RN re: SBAR, medications, and behavior.   Anila fall risk score 1

## 2022-12-16 NOTE — PROGRESS NOTES
Called to [facility] at request of nurse, Conard Bence RN for pt wanting to talk w/supervisor. Ms. Dalia Bautista expressed concern about adjusting her medications she is being administered and wants them adjusted to what she was given prior. Ms. Dalia Bautista also wanted to discuss other concerns that she spoke w/Dr. Nancy Murphy earlier - advised these concerns needed to be discuss this with her physician in morning. Writer spoke w/Dr. Nancy Murphy advising Ms. Dalia Bautista wanting to change her medications and concerns - Dr. Nancy Murphy aware of these issues and advised he will discuss them with her tomorrow. Ms. Dalia Bautista advised Dr. Nancy Murphy will discuss her concerns in the morning.

## 2022-12-17 PROCEDURE — 74011250637 HC RX REV CODE- 250/637: Performed by: PSYCHIATRY & NEUROLOGY

## 2022-12-17 PROCEDURE — 65220000003 HC RM SEMIPRIVATE PSYCH

## 2022-12-17 RX ADMIN — ZIPRASIDONE HYDROCHLORIDE 60 MG: 60 CAPSULE ORAL at 08:44

## 2022-12-17 RX ADMIN — ZIPRASIDONE HYDROCHLORIDE 60 MG: 60 CAPSULE ORAL at 18:00

## 2022-12-17 NOTE — BH NOTES
Weekend Coverage:  CC:\"I am doing fine\"    Sleep:\"I dont sleep\"-pt presents stating that she has not slept x 2 days. This is contrary to what was reported by overnight staff. Appetite:fair, she reports that she tried to eat her meals  Incidents in last 24 hours:na  PRNs:na  Thought process: delusional, hyperreligious  Thought Content:AH-\"lexa héctor and angels\"    Un presents today alert and oriented. Speech is rapid and incomprehensible intermittently. She reports that there is a voice that tells her to follow the doctors order and take her medicine. She denies medication side effects.     Plan: continue current treatment

## 2022-12-17 NOTE — BH NOTES
Shift change report given to [facility] re: SBAR, medications, and behavior.   Anila fall risk score:  1

## 2022-12-17 NOTE — ROUTINE PROCESS
Shift change report given to Polo Reynoso Rn, re: SBAR. Medications, and behavior.   Anila Fall Risk Score (1)

## 2022-12-17 NOTE — PROGRESS NOTES
12/16/22 2118 2000 [facility]   Number of participants 3   Time in 2000   Time out 2030   Total Time 30   Interventions/techniques Supported   Follows directions Followed directions   Interactions Interacted appropriately   Mental Status Calm   Behavior/appearance Cooperative   Suicide Risk Factors no thoughts of self harm. Suicide Protective Factors Denies intent   Goals Achieved Able to engage in interactions       She has no anxiety, depression, or pain at this time.

## 2022-12-17 NOTE — PROGRESS NOTES
Pt expressed no feelings of anxiety or depression but had slight lower back pain. Pt goal is to focus on going home.    12/17/22 [facility] meeting   Attendance Attended   Number of participants 4   Time in 1030   Time out 1045   Total Time 15   Interventions/techniques Informed   Follows directions Followed directions   Interactions Interacted appropriately   Mental Status Calm   Behavior/appearance Cooperative   Suicide Protective Factors Denies intent   Goals Achieved Able to engage in interactions

## 2022-12-17 NOTE — PROGRESS NOTES
Pt expressed she had no feelings of anxiety, depression or physical pain. Pt goal is to focus on discharge.     [facility]   Attendance Attended   Number of participants 4   Time in 1430   Time out 1445   Total Time 15   Interventions/techniques Informed   Follows directions Followed directions   Interactions Interacted appropriately   Behavior/appearance Attentive   Suicide Protective Factors Denies intent

## 2022-12-17 NOTE — BH NOTES
Affect blunted,mood anxious. Pt attended and participated in scheduled group activities. Pt was social and appropriate with staff and peers. Pt ate 100% of snack. Pt vital signs stable with no complaints of pain. Pt denies SI/HI/AVH. Pt has no scheduled medications for evening/night and did not request a PRN. Pt is in no apparent distress at this time and made no delusional statements. Pt rested in her bed with her eyes closed for 7 hours.

## 2022-12-17 NOTE — BH NOTES
INTERVAL Hx:  Records and clinical information were reviewed. No appreciable change--still delusional (Scientology/grandiose and paranoid) with no insight at all, and still believes she's been fully cured by the [facility]. Also believes she was being \"poisoned\" with the increased dose of Abilify, and a soon as I tried to explain any alternative view, she immediately stops the interview and says I'm lying, etc. No SE's noted or reported so far with the Geodon. Doesn't seem to be as distressed about it so far. Will increase the dose to 60 mg BID as planned. Slept 7 hours last night. 12/16 - the patient spent much of the night awake praying. She is compliant with court ordered medication but spoke at length today about the necessity of taking medication. She reports ongoing delusional beliefs and is unable to reality test but repeatedly states that her medication is not correct and the dose is too high. Patient with little insight into the reason for hospitalization and is discharge focused. She feels she will be able to appeal both commitment and court ordered treatment once she speaks with the primary team. Patient denies SI/HI/AVH.     MEDS:  Current Facility-Administered Medications   Medication Dose Route Frequency    ziprasidone hcl (GEODON) capsule 60 mg  60 mg Oral BID WITH MEALS    Or    ziprasidone (GEODON) 10 mg in sterile water (preservative free) 0.5 mL injection  10 mg IntraMUSCular BID WITH MEALS    ziprasidone (GEODON) 20 mg in sterile water (preservative free) 1 mL injection  20 mg IntraMUSCular Q12H PRN    acetaminophen (TYLENOL) tablet 650 mg  650 mg Oral Q4H PRN    ibuprofen (MOTRIN) tablet 400 mg  400 mg Oral Q8H PRN    magnesium hydroxide (MILK OF MAGNESIA) 400 mg/5 mL oral suspension 30 mL  30 mL Oral DAILY PRN    alum-mag hydroxide-simeth (MYLANTA) oral suspension 30 mL  30 mL Oral Q4H PRN    traZODone (DESYREL) tablet 50 mg  50 mg Oral QHS PRN    hydrOXYzine pamoate (VISTARIL) capsule 50 mg 50 mg Oral TID PRN       VITALS: Patient Vitals for the past 12 hrs:   Temp Pulse Resp BP SpO2   12/16/22 1944 97.1 °F (36.2 °C) 97 18 134/71 100 %         LABS: .No results found for this or any previous visit (from the past 24 hour(s)). MSE:   Appearance: casually dressed; adequately groomed  Behavior: fewer episodes of agitation  Motor: normal  Mood/Affect: irritable; restricted  Thought Process: mostly organized but can derail  Thought Content: +Hindu and paranoid delusions; denies SI/HI  Perceptions: likely +AH's  Insight/Judgment: nil    ASSESSMENT:   1.  Schizoaffective disorder, bipolar type (F25.0)    PLAN:  1. Increase the Geodon to 60 mg BID with meals. 2.  Continue the Geodon at 10 mg IM if she refuses the PO med. 3.  ELOS: 7-12 days, possibly longer given her limited response so far and her history of refractory illness.

## 2022-12-17 NOTE — BH NOTES
Patient affect blunted/constricted, mood labile. Patient alert and oriented x 4. Denies pain. No SI/HI/AVH. Patient remains paranoid and delusional.  Patient is hyper-Adventist. Patient cooperative and pleasant. No agitation or aggression noted. Medication compliant and no PRN's requested or given. Appetite good eats 100% of all meals plus snacks. Patient in no apparent distress. Patient pulse-109 but all other vitals signs within normal limits.

## 2022-12-17 NOTE — PROGRESS NOTES
Problem: Falls - Risk of  Goal: *Absence of Falls  Description: Document Lamont Alcocer Fall Risk and appropriate interventions in the flowsheet.   Outcome: Progressing Towards Goal  Note: Fall Risk Interventions:            Medication Interventions: Teach patient to arise slowly         History of Falls Interventions: Door open when patient unattended         Problem: Depressed Mood (Adult/Pediatric)  Goal: *STG: Attends activities and groups  Outcome: Progressing Towards Goal  Goal: *STG: Remains safe in hospital  Outcome: Progressing Towards Goal  Goal: *STG: Complies with medication therapy  Outcome: Progressing Towards Goal

## 2022-12-17 NOTE — PROGRESS NOTES
Behavioral Services                                              Medicare Re-Certification    I certify that the inpatient psychiatric hospital services furnished since the previous certification/re-certification were, and continue to be, medically necessary for;    [x] (1) Treatment which could reasonably be expected to improve the patient's condition,    [x] (2) Or for diagnostic study. Estimated length of stay/service 5-7 days    Plan for post-hospital care home    This patient continues to need, on a daily basis, active treatment furnished directly by or requiring the supervision of inpatient psychiatric personnel.     Electronically signed by Emeterio López MD on 12/16/2022 at 8:11 PM

## 2022-12-18 LAB
ATRIAL RATE: 96 BPM
CALCULATED P AXIS, ECG09: 73 DEGREES
CALCULATED R AXIS, ECG10: 67 DEGREES
CALCULATED T AXIS, ECG11: 61 DEGREES
DIAGNOSIS, 93000: NORMAL
P-R INTERVAL, ECG05: 138 MS
Q-T INTERVAL, ECG07: 380 MS
QRS DURATION, ECG06: 86 MS
QTC CALCULATION (BEZET), ECG08: 480 MS
VENTRICULAR RATE, ECG03: 96 BPM

## 2022-12-18 PROCEDURE — 65220000003 HC RM SEMIPRIVATE PSYCH

## 2022-12-18 PROCEDURE — 93005 ELECTROCARDIOGRAM TRACING: CPT

## 2022-12-18 PROCEDURE — 74011250637 HC RX REV CODE- 250/637: Performed by: PSYCHIATRY & NEUROLOGY

## 2022-12-18 RX ADMIN — ZIPRASIDONE HYDROCHLORIDE 60 MG: 60 CAPSULE ORAL at 09:01

## 2022-12-18 RX ADMIN — ZIPRASIDONE HYDROCHLORIDE 60 MG: 60 CAPSULE ORAL at 17:42

## 2022-12-18 NOTE — BH NOTES
1400:  Patient complained of having chest pain. Assessment done by This writer(RN). Patient vital signs BP-147/72, Pulse-104, RR-18, oral Temp 96.6, 02 SAT's 100% RA. Patient in no apparent distress. Beverly Muir (RAJNI) notified and EKG 12 lead ordered. Staff will continue to monitor.

## 2022-12-18 NOTE — BH NOTES
Patient affect blunted/restricted, mood labile. Alert and oriented x 4. Denies pain. No SI/HI/VH but having + auditory hallucinations. Patient is paranoid and delusional.  Patient is hyper-Buddhist. Patient in her room praying constantly very loud. Patient cooperative and pleasant. No agitation noted. Medication compliant and no PRN's given. Appetite good. Patient in no apparent distress. Patient pulse elevated  but all other vitals signs within normal limits.

## 2022-12-18 NOTE — BH NOTES
Weekend Coverage:  CC:\"I I had a bad day\"    Sleep:\"good\", patient reports sleeping through the night  Appetite:\"I dont have an appetite\", patient reports that her appetite is affected by her \"bad day\"  Incidents in last 24 hours: no behavioral incidents  PRNs: na  Thought process:disorganized, circumstantial, hyperreligious  Thought Content:Denies SI/HI, reports AH \"the voice of God\"    Un presents today alert and oriented. She states that she is having a \"bad day\" secondary to her health. She reports her blood pressure elevation is concerning to her. She initially reported chest pain then stated that she had to hold her chest because Ector Mckay told her \"I saved you today\". She reports that her feet and legs looked blue and felt cold \"just like my sons when he \". She reports that her son dies five years ago-this was a  traumatic event for her and she denies ever receiving therapy. She denies any pain at this time and does not appear to be in any acute distress. She is requesting to see the -her primary nurse will contact .      Plan: will obtain ECG and other labs, continue current treatment

## 2022-12-18 NOTE — BH NOTES
Affect blunted,mood anxious. Pt attended and participated in scheduled group activities. Pt was minimally social but appropriate with staff and peers. Pt ate 100% of snack. Pt vital signs stable with no complaints of pain. Pt denies SI/HI/AVH. Pt continues to be very preoccupied, with some religous delusions. Pt has no scheduled medications for evening/night and did not request a PRN. Pt continues to get up periodically during the night to pray. Pt is in no apparent distress at this time and made no overtly delusional statements. Pt rested in her bed with her eyes closed for 8.5 hours.

## 2022-12-18 NOTE — BH NOTES
Shift change report given to Linda Barkley re: SBAR, medications, and behavior.   Anila fall risk score:  1

## 2022-12-18 NOTE — PROGRESS NOTES
Problem: Falls - Risk of  Goal: *Absence of Falls  Description: Document Namrata Strickland Fall Risk and appropriate interventions in the flowsheet.   Outcome: Progressing Towards Goal  Note: Fall Risk Interventions:            Medication Interventions: Teach patient to arise slowly         History of Falls Interventions: Door open when patient unattended         Problem: Depressed Mood (Adult/Pediatric)  Goal: *STG: Participates in treatment plan  Outcome: Progressing Towards Goal  Goal: *STG: Attends activities and groups  Outcome: Progressing Towards Goal  Goal: *STG: Remains safe in hospital  Outcome: Progressing Towards Goal  Goal: *STG: Complies with medication therapy  Outcome: Progressing Towards Goal

## 2022-12-18 NOTE — PROGRESS NOTES
12/18/22 [address] [place] [address] meeting   Attendance Attended   Number of participants 5   Time in 1000   Time out 1032   Total Time 32   MTP problem Depression   Interventions/techniques Informed   Follows directions Followed directions   Interactions Interacted appropriately   Mental Status Calm;Depressed   Behavior/appearance Attentive; Cooperative   Suicide Risk Factors no thoughts of hurting herself nor others

## 2022-12-18 NOTE — ROUTINE PROCESS
Shift change report given to Shana Oakes, re: JOSEFINA. Medications, and behavior.   Anila Fall Risk Score (1)

## 2022-12-18 NOTE — PROGRESS NOTES
12/17/22 2152 2000 [facility] meeting   Attendance Attended   Number of participants 4   Time in 2000   Time out 2100   Total Time 60   Interventions/techniques Supported   Follows directions Followed directions   Interactions Interacted appropriately   Mental Status Calm   Behavior/appearance Cooperative   Suicide Risk Factors No thoughts of self harm. Suicide Protective Factors Denies intent   Goals Achieved Able to engage in interactions     She has no anxiety, depression, or pain at this time.

## 2022-12-18 NOTE — PROGRESS NOTES
Problem: Depressed Mood (Adult/Pediatric)  Goal: *STG: Participates in treatment plan  Outcome: Progressing Towards Goal  Goal: *STG: Attends activities and groups  Outcome: Progressing Towards Goal  Goal: *STG: Remains safe in hospital  Outcome: Progressing Towards Goal  Goal: *STG: Complies with medication therapy  Outcome: Progressing Towards Goal     Problem: Depressed Mood (Adult/Pediatric)  Goal: *LTG: Returns to previous level of functioning and participates with after care plan  Outcome: Not Met  Goal: *LTG: Understands illness and can identify signs of relapse  Outcome: Not Met

## 2022-12-19 PROCEDURE — 99232 SBSQ HOSP IP/OBS MODERATE 35: CPT | Performed by: PSYCHIATRY & NEUROLOGY

## 2022-12-19 PROCEDURE — 74011250637 HC RX REV CODE- 250/637: Performed by: PSYCHIATRY & NEUROLOGY

## 2022-12-19 PROCEDURE — 65220000003 HC RM SEMIPRIVATE PSYCH

## 2022-12-19 RX ORDER — ZIPRASIDONE HYDROCHLORIDE 80 MG/1
80 CAPSULE ORAL 2 TIMES DAILY WITH MEALS
Status: DISCONTINUED | OUTPATIENT
Start: 2022-12-19 | End: 2022-12-23 | Stop reason: HOSPADM

## 2022-12-19 RX ADMIN — ZIPRASIDONE HYDROCHLORIDE 80 MG: 80 CAPSULE ORAL at 10:07

## 2022-12-19 RX ADMIN — ZIPRASIDONE HYDROCHLORIDE 80 MG: 80 CAPSULE ORAL at 17:34

## 2022-12-19 NOTE — BH NOTES
Behavioral Health Interdisciplinary Rounds     Patient Name: Jaja Headings  Age: 62 y.o. Room/Bed:  232/02  Primary Diagnosis: Schizoaffective disorder, bipolar type ([facility] [address]   Admission Status: Involuntary Commitment and Forced Medication Order     Readmission within 30 days: no  Power of  in place: no  Patient requires a blocked bed: no          Reason for blocked bed:     VTE Prophylaxis: Not indicated    Mobility needs/Fall risk: yes  Flu Vaccine : no   Nutritional Plan: no  Consults: no         Labs/Testing due today?: no    Sleep hours: 5       Participation in Care/Groups:  yes  Medication Compliant?: Yes  PRNS (last 24 hours): None    Restraints (last 24 hours):  no     CIWA (range last 24 hours):     COWS (range last 24 hours):      Alcohol screening (AUDIT) completed -   AUDIT Score: 2     If applicable, date SBIRT discussed in treatment team AND documented:   AUDIT Screen Score: AUDIT Score: 2      Document Brief Intervention (corresponds directly with the 5 A's, Ask, Advise, Assess, Assist, and Arrange): At- Risk Patients (Score 7-15 for women; 8-15 for men)  Discuss concern patient is drinking at unhealthy levels known to increase risk of alcohol-related health problems. Is Patient ready to commit to change? If No:  Encourage reflection  Discuss short term and long term health risks of consuming alcohol  Barriers to change  Reaffirm willingness to help / Educational materials provided  If Yes:  Set goal  Plan  Educational materials provided    Harmful use or Dependence (Score 16 or greater)  Discuss short term and long term health risks of consuming alcohol  Recommendations  Negotiate drinking goal  Recommend addiction specialist/center  Arrange follow-up appointments.     Tobacco - patient is a smoker: Have You Used Tobacco in the Past 30 Days: No  Illegal Drugs use: Have You Used Any Illegal Substances Over the Past 12 Months: No    24 hour chart check complete: yes     Patient goal(s) for today: no goal set  Treatment team focus/goals: Remains safe in hospital  Progress note continues to have AH, denies SI she is compliant with her medication.  She often is praying/chanting loudly    LOS:  13  Expected LOS: 5-7    Financial concerns/prescription coverage:  no  Family contact: Dr. Beto Maurer has been in contact      Family requesting physician contact today:  no  Discharge plan: home with   Access to weapons : no        Outpatient provider(s): yes  Patient's preferred phone number for follow up call : [phone]    Participating treatment team members: Sophia Hill, Dr. Tammy DUNBAR), [facility]

## 2022-12-19 NOTE — GROUP NOTE
PAT  GROUP DOCUMENTATION INDIVIDUAL                                                                          Group Therapy Note    Date: 12/19/2022    Group Start Time: 0900  Group End Time: 5071  Group Topic: Process Group - Inpatient    111 [facility] OP    [place], [address] [facility] 1150 [facility] GROUP DOCUMENTATION GROUP    Group Therapy Note    Focus of session was on Self Respect Essentials. We spoke about why self-respect is so important to recovery in mental health and substance abuse. We discussed the ideas of having a sense of strengths and weaknesses, that we are entitled to be treated with respect, acceptance of self, sense of self is strong enough to withstand bumps and bruises, that we can feel worthwhile even when we don't know something, and that we don't bolster our self-respect at the expense of others. Group members were asked to share their thoughts and feelings about this and how they can increase their ability to respect themselves. Attendance: Attended    Patient's Goal:      Verbalize trust of significant other & eliminate accusations of disloyalty           Interventions/techniques: Informed, Validated, Reinforced, and Supported    Follows Directions: Followed directions    Interactions: Interacted appropriately    Mental Status: Congruent and Preoccupied    Behavior/appearance: Attentive and Withdrawn/quiet    Goals Achieved: Able to listen to others, Able to receive feedback, and Identified feelings      Additional Notes:  Pt listened to the group activity and discussion. Pt stated that she was not doing well today.   She was attentive but remained quiet for the group session    [employer]

## 2022-12-19 NOTE — BH NOTES
Patient has been making delusional religous statements regarding being cured and not needing medications. She is paranoid of Dr. Caitlin Esquivel and other staff members. Denies SI/HI. Appears to be responding to internal stimuli. Attended groups but did not fully participate. Her insight with her illness remains poor.

## 2022-12-19 NOTE — PROGRESS NOTES
Problem: Falls - Risk of  Goal: *Absence of Falls  Description: Document Tasneem Counts Fall Risk and appropriate interventions in the flowsheet.   Outcome: Progressing Towards Goal  Note: Fall Risk Interventions:            Medication Interventions: Teach patient to arise slowly         History of Falls Interventions: Door open when patient unattended         Problem: Depressed Mood (Adult/Pediatric)  Goal: *STG: Complies with medication therapy  Outcome: Progressing Towards Goal     Problem: Psychosis  Goal: *STG: Decreased hallucinations  Outcome: Progressing Towards Goal     Problem: Psychosis  Goal: *STG: Decreased delusional thinking  Outcome: Progressing Towards Goal     Problem: Psychosis  Goal: *STG/LTG: Complies with medication therapy  Outcome: Progressing Towards Goal

## 2022-12-19 NOTE — PROGRESS NOTES
12/18/22 2147 2000 [facility] meeting   Attendance Attended   Number of participants 5   Time in 2000   Time out 2030   Total Time 30   Interventions/techniques Supported   Follows directions Followed directions   Interactions Interacted appropriately   Mental Status Calm   Behavior/appearance Cooperative   Suicide Risk Factors No thoughts of self harm. Suicide Protective Factors Denies intent   Goals Achieved Able to engage in interactions     She has no anxiety, depression, or pain at this time.

## 2022-12-19 NOTE — PROGRESS NOTES
Problem: Falls - Risk of  Goal: *Absence of Falls  Description: Document Pernell Ward Fall Risk and appropriate interventions in the flowsheet.   Outcome: Progressing Towards Goal  Note: Fall Risk Interventions:            Medication Interventions: Teach patient to arise slowly         History of Falls Interventions: Door open when patient unattended         Problem: Patient Education: Go to Patient Education Activity  Goal: Patient/Family Education  Outcome: Progressing Towards Goal     Problem: Depressed Mood (Adult/Pediatric)  Goal: *STG: Remains safe in hospital  Outcome: Progressing Towards Goal     Problem: Risk for Elopement  Goal: Patient will not exit the unit/facility without proper escort  Outcome: Progressing Towards Goal

## 2022-12-19 NOTE — PROGRESS NOTES
12/19/22 Jarettsophia Melo 130 meeting   Attendance Attended   Number of participants 4   Time in 36   Time out 1045   Total Time 30   MTP problem Depression   Interventions/techniques Supported   Follows directions Followed directions   Interactions Interacted appropriately   Mental Status Calm   Behavior/appearance Cooperative; Motivated   Suicide Risk Factors STATES THAT SHE HAS NO THOUGHTS OF HURTING HERSELF OR OTHERS   Suicide Protective Factors Denies intent   Goals Achieved Able to engage in interactions; Able to listen to others; Able to give feedback to another       Mason Flower CNA

## 2022-12-19 NOTE — BH NOTES
Dr. Matt Lawrence on the unit and notified of patient's MEWS score of 3; pulse was elevated. No new ordered obtained at this time.

## 2022-12-19 NOTE — PROGRESS NOTES
INTERVAL Hx:  Records and clinical information from the weekend were reviewed. Still no appreciable change--still delusional (Muslim/grandiose and paranoid) with no insight at all, and still calls me (and everyone) a \"liar\" if they point out any symptom or issue. Still believes she's been fully cured by the [facility] and she continues to pray loudly at night which is disruptive to her roommate. No SE's noted with the Geodon and I increased it this AM, which has further angered her. Based on her history (as  related it), it's not clear that any med will be very beneficial. Slept 5 hours last night.     MEDS:  Current Facility-Administered Medications   Medication Dose Route Frequency    ziprasidone (GEODON) capsule 80 mg  80 mg Oral BID WITH MEALS    Or    ziprasidone (GEODON) 10 mg in sterile water (preservative free) 0.5 mL injection  10 mg IntraMUSCular BID WITH MEALS    ziprasidone (GEODON) 20 mg in sterile water (preservative free) 1 mL injection  20 mg IntraMUSCular Q12H PRN    acetaminophen (TYLENOL) tablet 650 mg  650 mg Oral Q4H PRN    ibuprofen (MOTRIN) tablet 400 mg  400 mg Oral Q8H PRN    magnesium hydroxide (MILK OF MAGNESIA) 400 mg/5 mL oral suspension 30 mL  30 mL Oral DAILY PRN    alum-mag hydroxide-simeth (MYLANTA) oral suspension 30 mL  30 mL Oral Q4H PRN    traZODone (DESYREL) tablet 50 mg  50 mg Oral QHS PRN    hydrOXYzine pamoate (VISTARIL) capsule 50 mg  50 mg Oral TID PRN       VITALS: Patient Vitals for the past 12 hrs:   Temp Pulse Resp BP SpO2   12/19/22 0911 97.9 °F (36.6 °C) (!) 108 18 (!) 149/73 100 %   12/19/22 0808 97.2 °F (36.2 °C) (!) 112 19 125/68 100 %         LABS: .  Recent Results (from the past 24 hour(s))   EKG, 12 LEAD, INITIAL    Collection Time: 12/18/22  2:28 PM   Result Value Ref Range    Ventricular Rate 96 BPM    Atrial Rate 96 BPM    P-R Interval 138 ms    QRS Duration 86 ms    Q-T Interval 380 ms    QTC Calculation (Bezet) 480 ms    Calculated P Axis 73 degrees    Calculated R Axis 67 degrees    Calculated T Axis 61 degrees    Diagnosis       Normal sinus rhythm  Prolonged QT  Abnormal ECG  No previous ECGs available  Confirmed by Paige Mullins MD, Jj Barr ([id number]) on 12/18/2022 5:08:17 PM         MSE:   Appearance: casually dressed; adequately groomed  Behavior: fewer episodes of agitation  Motor: normal  Mood/Affect: irritable; restricted  Thought Process: mostly organized but can derail  Thought Content: +Buddhist and paranoid delusions; denies SI/HI  Perceptions: likely +AH's  Insight/Judgment: nil    ASSESSMENT:   1.  Schizoaffective disorder, bipolar type (F25.0)    PLAN:  1. Increase the Geodon to 80 mg BID with meals. 2.  Continue the Geodon at 10 mg IM if she refuses the PO med. 3.  ELOS: 5-10 days--will need to coordiante with her  given the refractory nature of her illness.

## 2022-12-19 NOTE — MASTER TREATMENT PLAN
Master Treatment Plan Review for Reji Horan    Date of Admission Date Treatment Plan Reviewed Anticipated Date of Discharge Legal Status    12/6/22 12/18/22 TBD Involuntary Commitment and Forced Medication Order     Treatment & Discharge Planning Changes    Modality or Intervention Changes 12/18- EKG, 12 LEAD, initial      Psychotropic Medication Changes 12/15- Modify Geodon capsule 60 mg po BID with meals/            Administer Geodon 10 mg IM upon refusal of PO Geodon              Discharge Planning or Target Date Changes ELOS: 7-12 days, possibly longer given her limited response so far and her history of refractory illness   New Issues   N/A     Treatment Team Signatures    I have participated in the development of this plan of treatment and agree to its implementation.     Patient Signature       Patient Printed Name Date/Time   /Therapist Signature       //Therapist Printed Name Date/Time   RN Signature       RN Printed Name Date/Time   Unit  Signature       Unit  Printed Name Date/Time   MD Signature       MD Printed Name Date/Time

## 2022-12-19 NOTE — BH NOTES
Patient remains on the unit, she is irritable if symptoms she is displaying are pointed out to her. Her thoughts are mostly organized, the content is Nondenominational and paranoid. She denies SI/HI. She has been praying loudly knowing this has been disruptive to her roommate. She is doing her ADL care and participating in groups. Her medication was increased today. She will return home with her  and will continue to follow up with her outpatient providers.

## 2022-12-19 NOTE — BH NOTES
Affect constricted/labile, mood labile. Alert and Oriented X 4. Guarded and distracted. Attended and participated  group. . Interacted with staff and peers. Makes needs known. Ate snacks. Denied SI/HI/AVH and pain. No scheduled medications during shift. No prn meds requested or admin. Stable with no s/s of distress.  Laid in bed with eyes closed for 5 hours

## 2022-12-19 NOTE — BH NOTES
Shift change report given to Ángel Nino RN; re:SBAR, medications, and behavior.  Anila Fall Risk Score 1

## 2022-12-20 LAB
ALBUMIN SERPL-MCNC: 3.3 G/DL (ref 3.5–5)
ALBUMIN/GLOB SERPL: 1 {RATIO} (ref 1.1–2.2)
ALP SERPL-CCNC: 71 U/L (ref 45–117)
ALT SERPL-CCNC: 26 U/L (ref 12–78)
ANION GAP SERPL CALC-SCNC: 7 MMOL/L (ref 5–15)
AST SERPL-CCNC: 18 U/L (ref 15–37)
BILIRUB SERPL-MCNC: 0.2 MG/DL (ref 0.2–1)
BUN SERPL-MCNC: 17 MG/DL (ref 6–20)
BUN/CREAT SERPL: 24 (ref 12–20)
CALCIUM SERPL-MCNC: 8.4 MG/DL (ref 8.5–10.1)
CHLORIDE SERPL-SCNC: 108 MMOL/L (ref 97–108)
CO2 SERPL-SCNC: 29 MMOL/L (ref 21–32)
CREAT SERPL-MCNC: 0.7 MG/DL (ref 0.55–1.02)
GLOBULIN SER CALC-MCNC: 3.2 G/DL (ref 2–4)
GLUCOSE SERPL-MCNC: 103 MG/DL (ref 65–100)
MAGNESIUM SERPL-MCNC: 2.2 MG/DL (ref 1.6–2.4)
POTASSIUM SERPL-SCNC: 3.8 MMOL/L (ref 3.5–5.1)
PROT SERPL-MCNC: 6.5 G/DL (ref 6.4–8.2)
SODIUM SERPL-SCNC: 144 MMOL/L (ref 136–145)
T4 FREE SERPL-MCNC: 1 NG/DL (ref 0.8–1.5)
TSH SERPL DL<=0.05 MIU/L-ACNC: 0.83 UIU/ML (ref 0.36–3.74)

## 2022-12-20 PROCEDURE — 84439 ASSAY OF FREE THYROXINE: CPT

## 2022-12-20 PROCEDURE — 99231 SBSQ HOSP IP/OBS SF/LOW 25: CPT | Performed by: PSYCHIATRY & NEUROLOGY

## 2022-12-20 PROCEDURE — 84443 ASSAY THYROID STIM HORMONE: CPT

## 2022-12-20 PROCEDURE — 83735 ASSAY OF MAGNESIUM: CPT

## 2022-12-20 PROCEDURE — 80053 COMPREHEN METABOLIC PANEL: CPT

## 2022-12-20 PROCEDURE — 65220000003 HC RM SEMIPRIVATE PSYCH

## 2022-12-20 PROCEDURE — 74011250637 HC RX REV CODE- 250/637: Performed by: PSYCHIATRY & NEUROLOGY

## 2022-12-20 PROCEDURE — 36415 COLL VENOUS BLD VENIPUNCTURE: CPT

## 2022-12-20 RX ADMIN — ZIPRASIDONE HYDROCHLORIDE 80 MG: 80 CAPSULE ORAL at 08:35

## 2022-12-20 RX ADMIN — ZIPRASIDONE HYDROCHLORIDE 80 MG: 80 CAPSULE ORAL at 17:18

## 2022-12-20 NOTE — PROGRESS NOTES
Problem: Psychosis  Goal: *STG: Remains safe in hospital  Outcome: Progressing Towards Goal     Problem: Paranoid Ideation  Goal: *LTG: Show more trust in others by speaking positively of them & reporting comfort in socializing  Outcome: Not Met  Goal: *STG: Verbalize trust of significant other & feel relaxed when not in his/her presence  Outcome: Not Met  Goal: *STG: Stop being accusatory of others  Outcome: Not Met  Goal: *STG:Report interacting socially without fear or suspicion  Outcome: Not Met     Problem: Psychosis  Goal: *STG: Decreased hallucinations  Outcome: Not Met  Goal: *STG: Decreased delusional thinking  Outcome: Not Met

## 2022-12-20 NOTE — BH NOTES
Patient blunted/constricted, mood anxious. Patient alert and oriented x 4. Denies pain. No SI/HI/VH but having positive auditory hallucinations. Patient remains paranoid and delusion. Patient quiet and isolative with guarded behavior. Hyper-Adventism. Patient cooperative and pleasant. No agitation or aggression noted. Medication compliant and no PRN's requested or given. Appetite good eats 100% of all meals plus snacks. Patient in no apparent distress. Vitals signs within normal limits.

## 2022-12-20 NOTE — PROGRESS NOTES
Patient said she had a difficult day . Patient said she has right finger pain and back pain 2/10 . Patient said she has no anxiety , no depression , and no SI    12/19/22 2108   Group Therapy   Group Community meeting   Attendance Attended   Number of participants 4   Time in 2000   Time out 2030   Total Time 30   MTP problem Fall risk;Pain   Interventions/techniques Provided feedback   Follows directions Followed directions   Interactions Interacted appropriately   Mental Status Anxious   Suicide Risk Factors No SI   Suicide Protective Factors Denies intent   Goals Achieved Able to listen to others; Able to self-disclose; Identified feelings

## 2022-12-20 NOTE — BH NOTES
Patients court appointed  called to say that patients appeal hearing is set for January 6, 2023. SW is to notify  when there is D/C scheduled to remove her from the docket.

## 2022-12-20 NOTE — PROGRESS NOTES
INTERVAL Hx:  Records and clinical information were reviewed. Actually much calmer with me today--didn't get agitated or accusatory like she had done everyday prior. Says she's more \"sleepy\" with the Geodon, and she did sleep much better last night. Unclear if she's truly calmer and less paranoid, or if she's consciously trying to remain calm. Still believes she's been fully cured by the [facility], but she's not pushing the issue today. No objective SE's noted with the Geodon. I spoke with her  yesterday and he agrees that she may be slightly better, but not fully well. Slept 8.5 hours last night.     MEDS:  Current Facility-Administered Medications   Medication Dose Route Frequency    ziprasidone (GEODON) capsule 80 mg  80 mg Oral BID WITH MEALS    Or    ziprasidone (GEODON) 10 mg in sterile water (preservative free) 0.5 mL injection  10 mg IntraMUSCular BID WITH MEALS    ziprasidone (GEODON) 20 mg in sterile water (preservative free) 1 mL injection  20 mg IntraMUSCular Q12H PRN    acetaminophen (TYLENOL) tablet 650 mg  650 mg Oral Q4H PRN    ibuprofen (MOTRIN) tablet 400 mg  400 mg Oral Q8H PRN    magnesium hydroxide (MILK OF MAGNESIA) 400 mg/5 mL oral suspension 30 mL  30 mL Oral DAILY PRN    alum-mag hydroxide-simeth (MYLANTA) oral suspension 30 mL  30 mL Oral Q4H PRN    traZODone (DESYREL) tablet 50 mg  50 mg Oral QHS PRN    hydrOXYzine pamoate (VISTARIL) capsule 50 mg  50 mg Oral TID PRN       VITALS: Patient Vitals for the past 12 hrs:   Temp Pulse Resp BP SpO2   12/20/22 0752 (!) 96.7 °F (35.9 °C) (!) 104 18 124/69 99 %         LABS: .  Recent Results (from the past 24 hour(s))   METABOLIC PANEL, COMPREHENSIVE    Collection Time: 12/20/22  5:57 AM   Result Value Ref Range    Sodium 144 136 - 145 mmol/L    Potassium 3.8 3.5 - 5.1 mmol/L    Chloride 108 97 - 108 mmol/L    CO2 29 21 - 32 mmol/L    Anion gap 7 5 - 15 mmol/L    Glucose 103 (H) 65 - 100 mg/dL    BUN 17 6 - 20 MG/DL    Creatinine 0.70 0.55 - 1.02 MG/DL    BUN/Creatinine ratio 24 (H) 12 - 20      eGFR >60 >60 ml/min/1.73m2    Calcium 8.4 (L) 8.5 - 10.1 MG/DL    Bilirubin, total 0.2 0.2 - 1.0 MG/DL    ALT (SGPT) 26 12 - 78 U/L    AST (SGOT) 18 15 - 37 U/L    Alk. phosphatase 71 45 - 117 U/L    Protein, total 6.5 6.4 - 8.2 g/dL    Albumin 3.3 (L) 3.5 - 5.0 g/dL    Globulin 3.2 2.0 - 4.0 g/dL    A-G Ratio 1.0 (L) 1.1 - 2.2     MAGNESIUM    Collection Time: 12/20/22  5:57 AM   Result Value Ref Range    Magnesium 2.2 1.6 - 2.4 mg/dL   TSH 3RD GENERATION    Collection Time: 12/20/22  5:57 AM   Result Value Ref Range    TSH 0.83 0.36 - 3.74 uIU/mL       MSE:   Appearance: casually dressed; adequately groomed  Behavior: less agitation  Motor: normal  Mood/Affect:  restricted; less angry  Thought Process: mostly organized   Thought Content: less intense delusions; denies SI/HI  Perceptions: likely +AH's  Insight/Judgment: nil    ASSESSMENT:   1.  Schizoaffective disorder, bipolar type (F25.0)    PLAN:  1. Continue the Geodon at 80 mg BID with meals. 2.  Continue the Geodon at 10 mg IM if she refuses the PO med. 3.  ELOS: 5-9 days--will need to coordiante with her  given the refractory nature of her illness.

## 2022-12-20 NOTE — BH NOTES
Affect constricted, mood anxious/irritable. Alert and Oriented X 4. Guarded  Attended and participated  group. . Interacted with staff and peers. Makes needs known. Ate snacks. Denied SI/HI/AVH. No scheduled medications during shift. No prn meds requested or admin. Pt approached nurse station 0630 with a smile on her face. Informed RN how great of a job  did with lab draw. Stable with no s/s of distress.  Laid in bed with eyes close 8 hours and 30 min

## 2022-12-20 NOTE — BH NOTES
Shift change report given to Lao Solders re: SBAR, medications, and behavior.   Anila fall risk score:  1

## 2022-12-20 NOTE — PROGRESS NOTES
Problem: Risk for Elopement  Goal: Patient will not exit the unit/facility without proper escort  12/20/2022 0137 by Hazel Vasques RN

## 2022-12-20 NOTE — PROGRESS NOTES
Problem: Falls - Risk of  Goal: *Absence of Falls  Description: Document [place] Ground Fall Risk and appropriate interventions in the flowsheet.   12/20/2022 0132 by Jeison Polo RN  Outcome: Progressing Towards Goal  Note: Fall Risk Interventions:            Medication Interventions: Teach patient to arise slowly         History of Falls Interventions: Door open when patient unattended      12/20/2022 0129 by Jeison Polo RN  Outcome: Progressing Towards Goal  Note: Fall Risk Interventions:            Medication Interventions: Teach patient to arise slowly         History of Falls Interventions: Door open when patient unattended         Problem: Depressed Mood (Adult/Pediatric)  Goal: *STG: Attends activities and groups  12/20/2022 0132 by Jeison Polo RN  Outcome: Progressing Towards Goal  12/20/2022 0129 by Jeison Polo RN  Outcome: Progressing Towards Goal  Goal: *STG: Remains safe in hospital  12/20/2022 0132 by Jeison Polo RN  Outcome: Progressing Towards Goal  12/20/2022 0129 by Jeison Polo RN  Outcome: Progressing Towards Goal     Problem: Patient Education: Go to Patient Education Activity  Goal: Patient/Family Education  Outcome: Not Progressing Towards Goal     Problem: Paranoid Ideation  Goal: *LTG: Show more trust in others by speaking positively of them & reporting comfort in socializing  Outcome: Not Progressing Towards Goal  Goal: *LTG: Interact with others without defensiveness or anger  12/20/2022 0132 by Jeison Polo RN  Outcome: Progressing Towards Goal  12/20/2022 0129 by Jeison Polo RN  Outcome: Progressing Towards Goal     Problem: Psychosis  Goal: *STG: Remains safe in hospital  12/20/2022 0132 by Jeison Polo RN  Outcome: Progressing Towards Goal  12/20/2022 0129 by Jeison Polo RN  Outcome: Progressing Towards Goal  Goal: *STG: Participates in individual and group therapy  Outcome: Progressing Towards Goal Problem: Risk for Elopement  Goal: Patient will not exit the unit/facility without proper escort  Outcome: Resolved/Met

## 2022-12-20 NOTE — BH NOTES
Shift change report given to Marianne Noyola RN re: SBAR, medications, and behavior.   Anila fall risk score 1

## 2022-12-20 NOTE — PROGRESS NOTES
Patient Goal: To have a relaxing day. Patient appetite is good, no physical pain, no suicidal or self harm thoughts. Patient stated that she has no depression or anxiety. 12/20/22 [place] 1 meeting   Attendance Attended   Number of participants 4   Time in 1030   Time out 1100   Total Time 30   MTP problem Depression; Fall risk   Follows directions Followed directions   Interactions Interacted appropriately   Mental Status Anxious;Depressed;Preoccupied   Behavior/appearance Cooperative   Suicide Risk Factors No suicidal or self harm thoughts   Goals Achieved Able to engage in interactions; Able to give feedback to another;Able to listen to others

## 2022-12-20 NOTE — GROUP NOTE
PAT  GROUP DOCUMENTATION INDIVIDUAL                                                                          Group Therapy Note    Date: 12/20/2022    Group Start Time: 0900  Group End Time: 4700  Group Topic: Process Group - Inpatient    [address] [facility] GROUP DOCUMENTATION GROUP    Group Therapy Note    Focus of session was on understanding the need for a healthy balance in our mood and our day to day functioning. We reviewed the handout on healthy balance from [facility] about being aware of low energy or mood signs and symptoms, well balanced and healthy signs and symptoms, and high energy or mood signs and symptoms. We went over thoughts, feelings (both emotions and physical sensations) and actions and behaviors for each level. We spoke about what group members were not aware of or didn't take note of in the past and how it can help them today maintain a healthy balance. Attendance: Attended    Patient's Goal:      Demonstrates reduction in symptoms and increase in insight into coping skills/future focused          Interventions/techniques: Informed, Validated, Reinforced, and Supported    Follows Directions: Followed directions    Interactions: Interacted appropriately    Mental Status: Calm and Congruent    Behavior/appearance: Attentive, Cooperative, and Withdrawn/quiet    Goals Achieved: Able to listen to others, Able to receive feedback, and Able to self-disclose      Additional Notes:  Pt listened to the group activity and discussion. Pt was more alert and responsive today. She smiled on occasion and stated she was \"doing ok\". She was able to ask a question related to the activity and appropriately responded.      Rg Newton

## 2022-12-21 LAB
ATRIAL RATE: 107 BPM
CALCULATED P AXIS, ECG09: 75 DEGREES
CALCULATED R AXIS, ECG10: 51 DEGREES
CALCULATED T AXIS, ECG11: 55 DEGREES
DIAGNOSIS, 93000: NORMAL
P-R INTERVAL, ECG05: 142 MS
Q-T INTERVAL, ECG07: 358 MS
QRS DURATION, ECG06: 80 MS
QTC CALCULATION (BEZET), ECG08: 477 MS
VENTRICULAR RATE, ECG03: 107 BPM

## 2022-12-21 PROCEDURE — 99232 SBSQ HOSP IP/OBS MODERATE 35: CPT | Performed by: PSYCHIATRY & NEUROLOGY

## 2022-12-21 PROCEDURE — 65220000003 HC RM SEMIPRIVATE PSYCH

## 2022-12-21 PROCEDURE — 93005 ELECTROCARDIOGRAM TRACING: CPT

## 2022-12-21 PROCEDURE — 74011250637 HC RX REV CODE- 250/637: Performed by: PSYCHIATRY & NEUROLOGY

## 2022-12-21 RX ADMIN — ZIPRASIDONE HYDROCHLORIDE 80 MG: 80 CAPSULE ORAL at 08:18

## 2022-12-21 RX ADMIN — ZIPRASIDONE HYDROCHLORIDE 80 MG: 80 CAPSULE ORAL at 17:47

## 2022-12-21 NOTE — BH NOTES
Patient affect blunted/constricted and mood anxious. Patient alert and oriented x 4. Denies pain. No SI/HI/VH but having positive auditory hallucinations. Patient remains paranoid and delusional.  Patient stated \" I am healed by the [facility]. \"  Patient having positive internal stimuli. Patient cooperative and pleasant. No agitation or aggression noted. Medication compliant and no PRN's requested or given. Appetite good eats 100% of all meals plus snacks. Patient attended and engaged in groups with prompting. Patient in no apparent distress. Vitals signs within normal limits. Patient in dayroom most of the shift putting a puzzle together and playing games with staff.

## 2022-12-21 NOTE — BH NOTES
Patient alert and oriented x3. Calm, guarded attitude. Blunted/constricted affect. Has been out of room and in the dayroom doing a puzzle. Denies pain. No SI/HI/VH. Paranoid/religous delusions but also has increased her trust in staff and willing to converse with staff if she is asked questions. Ate 100% of snack. Rested in bed with eyes closed for 6 hours 45 minutes.

## 2022-12-21 NOTE — BH NOTES
Shift change report given to [facility] re: SBAR, medications, and behavior.   Anila fall risk score:  1

## 2022-12-21 NOTE — PROGRESS NOTES
12/20/22 2103   Group Therapy   Group Community meeting   Attendance Attended   Number of participants 1   Time in 2000   Time out 2030   Total Time 30   MTP problem Other (comment)  (Schizoaffective disorder, bipolar-type)   Interventions/techniques Supported   Follows directions Followed directions   Interactions Interacted appropriately   Mental Status Calm   Behavior/appearance Cooperative   Suicide Risk Factors Un has no thoughts of hurting herself nor anyone else   Suicide Protective Factors Denies intent   Goals Achieved Able to engage in interactions; Able to listen to others; Able to give feedback to another     Mayola Fleischer was out for the [employer] group tonight. She stated that she is having a not so bad day, ate not to bad, and should sleep good tonight she said. She is having no anxiety nor depression. Something good that happened today was talking with her  and friend, and hopes to be able to leave on Friday if she stays improving, but nothing bad has happened today. She has no thoughts of hurting herself nor anyone else and is having no pain.     Pool Hanson CNA

## 2022-12-21 NOTE — GROUP NOTE
PAT  GROUP DOCUMENTATION INDIVIDUAL                                                                          Group Therapy Note    Date: 12/21/2022    Group Start Time: 0900  Group End Time: 5851  Group Topic: Process Group - Inpatient    [address] [place] [facility] GROUP DOCUMENTATION GROUP    Group Therapy Note    Attendees: Focus of session was based on information from website [employer] which created a useful list of lessons from cognitive behavioral therapy. I reviewed the lessons covered which are situations cannot create feelings, only your thoughts and the situation can, notice unhelpful thoughts and replace them with helpful thoughts, take note of which behaviors make you feel better or worse, turn your focus to the task or the environment, avoidance makes anxiety worse, act the way you want to be, no matter how you feel, and let go of expectations. Group members were asked to share their thoughts on which lesson they can learn to apply and why that one may be able to help them. Attendance: Attended    Patient's Goal:      Demonstrates reduction in symptoms and increase in insight into coping skills/future focused          Interventions/techniques: Informed, Validated, Promoted peer support, and Supported    Follows Directions: Followed directions    Interactions: Interacted appropriately    Mental Status: Anxious, Congruent, and Preoccupied    Behavior/appearance: Attentive, Cooperative, and Needed prompting    Goals Achieved: Able to engage in interactions, Able to receive feedback, Able to self-disclose, Discussed coping, Identified feelings, Identified triggers, and Identified relapse prevention strategies      Additional Notes:  Naveen participated in group and she spoke about how she hopes she can go home on Friday. She spoke about how she is anxious this morning but likely due to her worry about if she will be able to go home.   She spoke about how she will continue to work on managing her feelings but she wants to be home for the holiday and celebrate her grandchild's birthday.       Delores Batista

## 2022-12-21 NOTE — PROGRESS NOTES
INTERVAL Hx:  Records and clinical information were reviewed. Appears to be improving quite a bit on the increased dose of Geodon. She' slightly tired but not overtly so, and she otherwise hasn't had any SE's. She tells me the [facility] are much less strong, and she doesn't appear to be nearly so guarded and paranoid, and she's not been agitated at all with me the past 2 days. I spoke with her  and he also agrees that she's much better and likely not feigning being well. Pulse has been elevated: 101--117 the past 2 days. Will recheck an ECG to monitor the QTc interval. Slept 6.75 hours last night. MEDS:  Current Facility-Administered Medications   Medication Dose Route Frequency    ziprasidone (GEODON) capsule 80 mg  80 mg Oral BID WITH MEALS    Or    ziprasidone (GEODON) 10 mg in sterile water (preservative free) 0.5 mL injection  10 mg IntraMUSCular BID WITH MEALS    ziprasidone (GEODON) 20 mg in sterile water (preservative free) 1 mL injection  20 mg IntraMUSCular Q12H PRN    acetaminophen (TYLENOL) tablet 650 mg  650 mg Oral Q4H PRN    ibuprofen (MOTRIN) tablet 400 mg  400 mg Oral Q8H PRN    magnesium hydroxide (MILK OF MAGNESIA) 400 mg/5 mL oral suspension 30 mL  30 mL Oral DAILY PRN    alum-mag hydroxide-simeth (MYLANTA) oral suspension 30 mL  30 mL Oral Q4H PRN    traZODone (DESYREL) tablet 50 mg  50 mg Oral QHS PRN    hydrOXYzine pamoate (VISTARIL) capsule 50 mg  50 mg Oral TID PRN       VITALS: Patient Vitals for the past 12 hrs:   Temp Pulse Resp BP SpO2   12/21/22 0707 (!) 96.6 °F (35.9 °C) (!) 117 20 118/74 100 %         LABS: .No results found for this or any previous visit (from the past 24 hour(s)).       MSE:   Appearance: casually dressed; adequately groomed  Behavior: calm and cooperative  Motor: normal  Mood/Affect:  close to euthymic; stable  Thought Process: fairly organized   Thought Content: minimal delusional thoughts; denies SI/HI  Perceptions: reduced AH's  Insight/Judgment: limited    ASSESSMENT:   1.  Schizoaffective disorder, bipolar type (F25.0)    PLAN:  1. Continue the Geodon at 80 mg BID with meals. 2.  Continue the Geodon at 10 mg IM if she refuses the PO med. 3.  ELOS: 2 days if stable--F/U with Dr. Charly Sanz.

## 2022-12-21 NOTE — BH NOTES
Shift change report given to  Warner Jeffries RN. Re; SBAR, medications, and behavior.    HELLER fall risk score; 1

## 2022-12-21 NOTE — PROGRESS NOTES
Problem: Falls - Risk of  Goal: *Absence of Falls  Description: Document Sarah Embs Fall Risk and appropriate interventions in the flowsheet.   Outcome: Progressing Towards Goal  Note: Fall Risk Interventions:            Medication Interventions: Teach patient to arise slowly         History of Falls Interventions: Door open when patient unattended         Problem: Depressed Mood (Adult/Pediatric)  Goal: *STG: Remains safe in hospital  Outcome: Progressing Towards Goal  Goal: *STG: Complies with medication therapy  Outcome: Progressing Towards Goal     Problem: Paranoid Ideation  Goal: *LTG: Show more trust in others by speaking positively of them & reporting comfort in socializing  Outcome: Progressing Towards Goal  Goal: *LTG: Interact with others without defensiveness or anger  Outcome: Progressing Towards Goal

## 2022-12-21 NOTE — BH NOTES
Patient pulse-118 and continues to be elevated in am.   Dr. Leilani Herzog reordered a follow-up EKG 12 lead and reviewed results which was Sinus tachycardia QT/QTc 358/477 ms but (less than 500 per MD). No new orders at this time. Staff will continue to monitor patient due to being on psych medications which can elevated the heart rate. Patient in no apparent distress all other vital signs within normal limits. Patient sitting in dayroom putting a puzzle together at this time.

## 2022-12-21 NOTE — PROGRESS NOTES
Pt expressed having no feelings of anxiety, depression or physical pain. Pt goal is to finish her puzzle by the end of the day.     12/21/22 [facility] meeting   Attendance Attended   Number of participants 2   Time in 1610   Time out 1620   Total Time 10   Interventions/techniques Informed   Follows directions Followed directions   Interactions Interacted appropriately   Mental Status Calm;Flat   Behavior/appearance Cooperative   Suicide Protective Factors Denies intent

## 2022-12-22 PROCEDURE — 74011250637 HC RX REV CODE- 250/637: Performed by: PSYCHIATRY & NEUROLOGY

## 2022-12-22 PROCEDURE — 99238 HOSP IP/OBS DSCHRG MGMT 30/<: CPT | Performed by: PSYCHIATRY & NEUROLOGY

## 2022-12-22 PROCEDURE — 65220000003 HC RM SEMIPRIVATE PSYCH

## 2022-12-22 PROCEDURE — 99231 SBSQ HOSP IP/OBS SF/LOW 25: CPT | Performed by: PSYCHIATRY & NEUROLOGY

## 2022-12-22 RX ORDER — ZIPRASIDONE HYDROCHLORIDE 80 MG/1
80 CAPSULE ORAL 2 TIMES DAILY WITH MEALS
Qty: 60 CAPSULE | Refills: 1 | Status: SHIPPED | OUTPATIENT
Start: 2022-12-22

## 2022-12-22 RX ADMIN — ACETAMINOPHEN 650 MG: 325 TABLET, FILM COATED ORAL at 12:59

## 2022-12-22 RX ADMIN — ZIPRASIDONE HYDROCHLORIDE 80 MG: 80 CAPSULE ORAL at 08:30

## 2022-12-22 RX ADMIN — ZIPRASIDONE HYDROCHLORIDE 80 MG: 80 CAPSULE ORAL at 17:14

## 2022-12-22 NOTE — PROGRESS NOTES
12/21/22 2110 2000 [facility] meeting   Attendance Attended   Number of participants 1   Time in 2045   Time out 2100   Total Time 15   Follows directions Followed directions   Interactions Interacted appropriately   Mental Status Calm   Behavior/appearance Cooperative   Suicide Risk Factors No thoughts of self harm   Suicide Protective Factors Denies intent   Goals Achieved Able to engage in interactions     She has no anxiety, depression, or pain at this time.

## 2022-12-22 NOTE — GROUP NOTE
PAT  GROUP DOCUMENTATION INDIVIDUAL                                                                          Group Therapy Note    Date: 12/22/2022    Group Start Time: 1400  Group End Time: 1450  Group Topic: Process Group - Inpatient    [address], [address] [facility] GROUP DOCUMENTATION GROUP    Group Therapy Note    Attendees: Focus of session was based on information from an article 6 Mental Traps and how to Overcome Them.   We spoke about those traps are: Assumptions, Beliefs, Comparisons, Desires, Expectations, and Ideals. We spoke about how these traps present themselves and the struggles they add to our daily life. We discussed strategies for how to overcome these traps and we discussed the importance of self-inquiry and look into the feelings and thoughts, we spoke about the overall strategy of approaching life with loving kindness, and to be defiant and challenge yourself. We discussed which mental trap that group members can work to overcome. Attendance: Attended    Patient's Goal:      Demonstrates reduction in symptoms and increase in insight into coping skills/future focused          Interventions/techniques: Informed, Validated, Provide feedback, and Supported    Follows Directions: Followed directions    Interactions: Interacted appropriately    Mental Status: Anxious, Congruent, and Preoccupied    Behavior/appearance: Attentive, Cooperative, Neatly groomed, and Needed prompting    Goals Achieved: Able to engage in interactions, Able to receive feedback, Able to experience relief/decrease in symptoms, Discussed discharge plans, Identified triggers, and Identified relapse prevention strategies      Additional Notes:  Naveen participated in group well. She spoke about how she is looking forward to going home tomorrow and she feels ready to be home and be with her family. She spoke about how she does not think she has any problems to face when she gets home.   She did talk about how she and her daughter had some problems in their relationship when her daughter was young and \"I was dealing with my illness.'      Delores Batista

## 2022-12-22 NOTE — PROGRESS NOTES
INTERVAL Hx:  Records and clinical information were reviewed. Still doing much better and she's been consistently calmer, less labile, and not nearly so paranoid.  and daughter both feel she's much better as well, and that she's appropriate for D/C tomorrow. No med SE's reported--now says she's not so tired or sleepy with it. No EPSE's or TD sx's, objectively. Has clearly improved a lot on the increased dose of Geodon. Pulse has been better since she realized she'll be D/C'ed: 98 and 101 the past 2 times. ECG shows the same degree of QTc prolongation: 477 ms. Slept 6.5 hours last night.     MEDS:  Current Facility-Administered Medications   Medication Dose Route Frequency    ziprasidone (GEODON) capsule 80 mg  80 mg Oral BID WITH MEALS    Or    ziprasidone (GEODON) 10 mg in sterile water (preservative free) 0.5 mL injection  10 mg IntraMUSCular BID WITH MEALS    ziprasidone (GEODON) 20 mg in sterile water (preservative free) 1 mL injection  20 mg IntraMUSCular Q12H PRN    acetaminophen (TYLENOL) tablet 650 mg  650 mg Oral Q4H PRN    ibuprofen (MOTRIN) tablet 400 mg  400 mg Oral Q8H PRN    magnesium hydroxide (MILK OF MAGNESIA) 400 mg/5 mL oral suspension 30 mL  30 mL Oral DAILY PRN    alum-mag hydroxide-simeth (MYLANTA) oral suspension 30 mL  30 mL Oral Q4H PRN    traZODone (DESYREL) tablet 50 mg  50 mg Oral QHS PRN    hydrOXYzine pamoate (VISTARIL) capsule 50 mg  50 mg Oral TID PRN       VITALS: Patient Vitals for the past 12 hrs:   Temp Pulse Resp BP SpO2   12/22/22 0716 98.3 °F (36.8 °C) (!) 101 16 120/73 96 %         LABS: .  Recent Results (from the past 24 hour(s))   EKG, 12 LEAD, SUBSEQUENT    Collection Time: 12/21/22  9:32 AM   Result Value Ref Range    Ventricular Rate 107 BPM    Atrial Rate 107 BPM    P-R Interval 142 ms    QRS Duration 80 ms    Q-T Interval 358 ms    QTC Calculation (Bezet) 477 ms    Calculated P Axis 75 degrees    Calculated R Axis 51 degrees    Calculated T Axis 55 degrees Diagnosis       Sinus tachycardia  Possible Left atrial enlargement  Borderline ECG  When compared with ECG of 18-DEC-2022 14:28,  No significant change was found           MSE:   Appearance: casually dressed; adequately groomed  Behavior: calm and cooperative  Motor: normal  Mood/Affect:  close to euthymic; stable  Thought Process: fairly organized   Thought Content: minimally hyperreligious; denies SI/HI  Perceptions: no obvious AH's  Insight/Judgment: limited    ASSESSMENT:   1.  Schizoaffective disorder, bipolar type (F25.0)    PLAN:  1. Continue the Geodon at 80 mg BID with meals. 2.  Plan for D/C tomorrow with --F/U with Dr. Sandip Hamilton (already scheduled, per ).

## 2022-12-22 NOTE — PROGRESS NOTES
Pt expressed that she had no feelings of anxiety, depression, or physical pain. Pt goal is to focus on going home.     12/22/22 3101 [employer] meeting   Attendance Attended   Number of participants 2   Time in 1045   Time out 1100   Total Time 15   Interventions/techniques Informed   Follows directions Followed directions   Interactions Interacted appropriately   Mental Status Calm   Behavior/appearance Cooperative   Suicide Protective Factors Denies intent   Goals Achieved Able to engage in interactions

## 2022-12-22 NOTE — ROUTINE PROCESS
Shift change report given to Corrinne Axon, re: JOSEFINA. Medications, and behavior.   Anila Fall Risk Score (1)

## 2022-12-22 NOTE — BH NOTES
Patient alert and oriented x3. Cooperative and guarded but  has become more trusting of staff. Anxious mood but settles with one on one or informing her of tasks being done. Religous/paranoid delusions but fixations has decrease and she is able to interact with staff/peers. Comes out of room during the day and participates in group and activities. Vitals stable, except HR at 101. Dr. Nadiya Escobar aware. No new orders. No SI/HI/VH. Aga Moss to take her medications and prn's. Denies pain but did request Tylenol for generalized discomfort related to cold symptoms of a \"stuffy \" head.        PRN Medication Documentation    Specific patient behavior that led to the need for PRN medication: stuffy head  Staff interventions attempted prior to PRN being given: patient requested   PRN medication given: Tylenol 650 mg @ 1259  Patient responsiveness/effectiveness of PRN medication: helpful

## 2022-12-22 NOTE — MASTER TREATMENT PLAN
Master Treatment Plan Review for Alice Bartlett    Date of Admission Date Treatment Plan Reviewed Anticipated Date of Discharge Legal Status    12/06/2022 12/21/2022  Involuntary Commitment and Forced Medication Order     Treatment & Discharge Planning Changes    Modality or Intervention Changes 74/72/2868- Metabolic Panel Comprehensive, Magnesium, TSH 3rd generation, T4 free  12/21/2022- EKG 12 lead   Psychotropic Medication Changes 12/19/2022- increase Geodon to 80 mg PO BID   Discharge Planning or Target Date Changes ELOS: 2 days if stable--F/U with Dr. Jina Polo. New Issues N/A       Treatment Team Signatures    I have participated in the development of this plan of treatment and agree to its implementation.     Patient Signature       Patient Printed Name Date/Time   /Therapist Signature       /Therapist Printed Name Date/Time   RN Signature       RN Printed Name  Jean-Claude Johnson [facility] Date/Time  12/21/2022  @5799   Unit  Signature       Unit  Printed Name Date/Time   MD Signature       MD Printed Name Date/Time

## 2022-12-22 NOTE — BH NOTES
Shift change report given to  Tia Madrigal RN. Re; SBAR, medications, and behavior.    HELLER fall risk score; 1

## 2022-12-22 NOTE — PROGRESS NOTES
Problem: Falls - Risk of  Goal: *Absence of Falls  Description: Document Eileen Don Fall Risk and appropriate interventions in the flowsheet.   Outcome: Progressing Towards Goal  Note: Fall Risk Interventions:            Medication Interventions: Teach patient to arise slowly         History of Falls Interventions: Door open when patient unattended         Problem: Patient Education: Go to Patient Education Activity  Goal: Patient/Family Education  Outcome: Progressing Towards Goal     Problem: Paranoid Ideation  Goal: *LTG: Show more trust in others by speaking positively of them & reporting comfort in socializing  Outcome: Progressing Towards Goal  Goal: *LTG: Interact with others without defensiveness or anger  Outcome: Progressing Towards Goal  Goal: *STG: Stop being accusatory of others  Outcome: Progressing Towards Goal  Goal: Paranoid Ideation Interventions  Outcome: Progressing Towards Goal     Problem: Risk for Elopement  Goal: Patient will not exit the unit/facility without proper escort  Outcome: Progressing Towards Goal

## 2022-12-22 NOTE — BH NOTES
Affect blunted, mood anxious. Pt did not attend or participate in scheduled group activities. Pt wanted to stay in her room and pray this evening. Pt not as labile or argumentative. Pt refused snack. Pt vital signs stable although her heart rate remains slightly elevated, Dr Lanier Postal aware. Pt has no complaints of pain. Pt denies SI/HI/AVH. Pt has no scheduled medications for evening/night and did not request a PRN. Pt is in no apparent distress at this time and made no delusional statements. Pt rested in her bed with her eyes closed for 6.5 hours.

## 2022-12-23 VITALS
OXYGEN SATURATION: 100 % | WEIGHT: 113 LBS | DIASTOLIC BLOOD PRESSURE: 67 MMHG | RESPIRATION RATE: 19 BRPM | HEART RATE: 109 BPM | SYSTOLIC BLOOD PRESSURE: 138 MMHG | HEIGHT: 64 IN | TEMPERATURE: 98.7 F | BODY MASS INDEX: 19.29 KG/M2

## 2022-12-23 PROCEDURE — 74011250637 HC RX REV CODE- 250/637: Performed by: PSYCHIATRY & NEUROLOGY

## 2022-12-23 RX ADMIN — ZIPRASIDONE HYDROCHLORIDE 80 MG: 80 CAPSULE ORAL at 08:17

## 2022-12-23 NOTE — DISCHARGE SUMMARY
[facility] DISCHARGE    Name:  Antonina Hinton  MR#:  [id number]  :  1965  ACCOUNT #:  [de-identified]  ADMIT DATE:  2022  DISCHARGE DATE:  2022      [facility] DISCHARGE SUMMARY    DATE OF DISCHARGE:  2022. NOTE:  Please see the admission note for further admission information. DISCHARGE DIAGNOSES:  AXIS I:  Schizoaffective affective disorder, bipolar type (F25.0). AXIS II:  Deferred. AXIS III:  Status post bilateral tubal ligation; history of thyroid issues. AXIS IV:  Stressors are mild. AXIS V:  Global Assessment of Functioning at discharge is 60-65. DISCHARGE MEDICATIONS:  Geodon 80 mg b.i.d. with food - - #60 pills with one refill, sent to the [facility] pharmacy in [facility]. DISPOSITION/FOLLOW-UP PLANS:  The patient is scheduled to be discharged in the care of her  on 2022 in stable condition. She will be returning home where she lives with him and following up with Dr. Charly Sanz at [facility] in [place] within the next two weeks as scheduled ( has scheduled the appointment). HOSPITAL COURSE:  As noted in the admission note, the patient is a 80-year-old  [place] female, who has a lengthy past psychiatric history of schizoaffective disorder, bipolar type. She was admitted on a TDO from the [address] [place] [address] emergency room after being brought there on ECO in a state of psychosis. Her  indicated she had stopped taking her antipsychotic medications (Patrick and Dewayne Borges) on 2022, believing that she had been healed \"by the [employer]. \"  She apparently has strong Mu-ism beliefs at baseline, but when she becomes more psychotic and manic in her presentation, she becomes hyper-Mu-ism and in fact during the course of her hospital stay here she eventually admitted to having auditory hallucinations believing that God was talking to her directly, that the TOULOUSE spirit had touched her in a special manner, and that she continued to have the delusional belief that she had completely been healed and did not need any medication. In the early part of her hospital stay, she remained very guarded, and essentially paranoid. She could maintain her behavior for the most part, but with any direct discussion about her condition, she would become very defensive and accuse every one of being a \"liar\". She could get loud and agitated, but she was never threatening or physically aggressive. She would also stay awake most of the night, usually praying very loudly to the point where it was disruptive to other patients on the unit. We first tried to simplify her medication regimen and put her back on Abilify and I increased the dose to 30 mg daily and avoid the use of the [place] as there may have been some indication that it had an activating effect. Unfortunately, the Abilify did not have much of an impact after about a week on it, and the patient was convinced that it was \"poisoning her\". We then switched to Geodon because she did receive an IM dose in the emergency room, which seemed to help, as one of the few antipsychotics that she had not tried. Initially, there was a little to no response with it, but when we finally went to 80 mg twice a day, she suddenly began to show some definite improvement, and that continued over the last four to five days of her hospital stay. By that time, she was much calmer, more cooperative, and stated she was no longer hearing the voice of God. She did not appear to be internally preoccupied and she was not nearly so defensive, paranoid, or accusatory. She was fairly pleasant, cooperative, and redirectable. Her  and daughter also believes that she sounded much better, and all were in agreement that she was safe and stable for discharge by 12/23/2022. She had no side effects with the Geodon at all except for some initial sedation.   There was no EPS or symptoms of TD, and she clearly was tolerating the medicine quite well. She also had no medical or physical issues while she was here. LABORATORY DATA:  In the emergency room, her glucose was minimally elevated at 116, but other laboratory tests were unremarkable. Urine drug screen and blood alcohol levels were both negative. We did do a repeat chemistry screen on 12/20/2022, which showed a glucose of 103, and other values were unremarkable as well. TSH was normal at 0.83 with a free T4 being normal at 1.0. She had a couple of EKGs over the last few days that she was here because of some tachycardia likely fueled by her anxiety. Initially, her QTC interval was 480 and the followup was 477, with mild sinus tachycardia, but no other abnormalities were noted. She is being discharged on 12/23/2022.       Phillip Boast, MD      [id number]  D:  12/22/2022 14:56  T:  12/22/2022 23:34  JOB #:  [id number]  CC:  [facility] Psychiatry Office

## 2022-12-23 NOTE — DISCHARGE INSTRUCTIONS
BEHAVIORAL HEALTH NURSING DISCHARGE NOTE      The following personal items collected during your admission are returned to you:   Dental Appliance: Dental Appliances: None  Vision: Visual Aid: None  Hearing Aid:    Jewelry: Jewelry: Earrings, Necklace  Clothing: Clothing: With patient, Other (comment) (see patient property list in chart)  Other Valuables: Other Valuables: None  Valuables sent to safe: Personal Items Sent to Safe:  (nothing in the safe)      PATIENT INSTRUCTIONS:    What to do at Home  . Recommended diet: Regular Diet    Recommended activity: Activity as tolerated    Follow-up with Dr. Deann Jordan  in 2 weeks. Call to make appt. If you have problems relating to your recovery, call your physician. The discharge information has been reviewed with the patient. The patient verbalized understanding.

## 2022-12-23 NOTE — BH NOTES
Behavioral Health Transition Record to Provider    Patient Name: Mono Dwyer  YOB: 1965  Medical Record Number: [id number]  Date of Admission: 12/6/2022  Date of Discharge: 12/23/2022    Attending Provider: Rosalie Gomez MD  Discharging Provider: Dr. Kim Toro  To contact this individual call [phone] and ask the  to page. If unavailable, ask to be transferred to [facility] Provider on call. [facility] Provider will be available on call 24/7 and during holidays. Primary Care Provider: El Ferris MD    No Known Allergies    Reason for Admission: The patient is a 49-year-old  [place] female who has a lengthy past psychiatric history of schizoaffective disorder, bipolar type. She was admitted on a TDO from the [facility] Emergency Room after presenting there on an ECO in a very psychotic state. Her  indicated that she had stopped taking her antipsychotic medications (Abilify and possibly Caplyta) on 12/03, believing that she had been healed Susan the holy spirit. \"  It is unclear how sick or ill she was at that point, but she had decompensated quite quickly to where she had become almost catatonic and was acting very bizarrely, such as crawling on the floor, not being responsive verbally at all, staring blankly, only speaking Alexander, etc.  She was transferred to [facility] on TDO.          Admission Diagnosis: Schizoaffective Disorder, Bipolar type F 25.0    * No surgery found *    Results for orders placed or performed during the hospital encounter of 12/06/22   LIPID PANEL   Result Value Ref Range    Cholesterol, total 178 <200 MG/DL    Triglyceride 71 <150 MG/DL    HDL Cholesterol 85 MG/DL    LDL, calculated 78.8 0 - 100 MG/DL    VLDL, calculated 14.2 MG/DL    CHOL/HDL Ratio 2.1 0.0 - 5.0     HEMOGLOBIN A1C WITH EAG   Result Value Ref Range    Hemoglobin A1c 5.8 (H) 4.0 - 5.6 %    Est. average glucose 780 mg/dL   METABOLIC PANEL, COMPREHENSIVE   Result Value Ref Range    Sodium 144 136 - 145 mmol/L    Potassium 3.8 3.5 - 5.1 mmol/L    Chloride 108 97 - 108 mmol/L    CO2 29 21 - 32 mmol/L    Anion gap 7 5 - 15 mmol/L    Glucose 103 (H) 65 - 100 mg/dL    BUN 17 6 - 20 MG/DL    Creatinine 0.70 0.55 - 1.02 MG/DL    BUN/Creatinine ratio 24 (H) 12 - 20      eGFR >60 >60 ml/min/1.73m2    Calcium 8.4 (L) 8.5 - 10.1 MG/DL    Bilirubin, total 0.2 0.2 - 1.0 MG/DL    ALT (SGPT) 26 12 - 78 U/L    AST (SGOT) 18 15 - 37 U/L    Alk. phosphatase 71 45 - 117 U/L    Protein, total 6.5 6.4 - 8.2 g/dL    Albumin 3.3 (L) 3.5 - 5.0 g/dL    Globulin 3.2 2.0 - 4.0 g/dL    A-G Ratio 1.0 (L) 1.1 - 2.2     MAGNESIUM   Result Value Ref Range    Magnesium 2.2 1.6 - 2.4 mg/dL   TSH 3RD GENERATION   Result Value Ref Range    TSH 0.83 0.36 - 3.74 uIU/mL   T4, FREE   Result Value Ref Range    T4, Free 1.0 0.8 - 1.5 NG/DL   EKG, 12 LEAD, INITIAL   Result Value Ref Range    Ventricular Rate 96 BPM    Atrial Rate 96 BPM    P-R Interval 138 ms    QRS Duration 86 ms    Q-T Interval 380 ms    QTC Calculation (Bezet) 480 ms    Calculated P Axis 73 degrees    Calculated R Axis 67 degrees    Calculated T Axis 61 degrees    Diagnosis       Normal sinus rhythm  Prolonged QT  Abnormal ECG  No previous ECGs available  Confirmed by Nathalia Whitlock MD, Mercedes Miranda ([id number]) on 12/18/2022 5:08:17 PM     EKG, 12 LEAD, SUBSEQUENT   Result Value Ref Range    Ventricular Rate 107 BPM    Atrial Rate 107 BPM    P-R Interval 142 ms    QRS Duration 80 ms    Q-T Interval 358 ms    QTC Calculation (Bezet) 477 ms    Calculated P Axis 75 degrees    Calculated R Axis 51 degrees    Calculated T Axis 55 degrees    Diagnosis       Sinus tachycardia  Possible Left atrial enlargement  Borderline ECG  When compared with ECG of 18-DEC-2022 14:28,  No significant change was found         Immunizations administered during this encounter: There is no immunization history on file for this patient.     Screening for Metabolic Disorders for Patients on Antipsychotic Medications  (Data obtained from the EMR)    Estimated Body Mass Index  Estimated body mass index is 19.4 kg/m² as calculated from the following:    Height as of this encounter: 5' 4\" (1.626 m). Weight as of this encounter: 51.3 kg (113 lb). Vital Signs/Blood Pressure  Visit Vitals  /67 (BP 1 Location: Left upper arm, BP Patient Position: Sitting)   Pulse (!) 109   Temp 98.7 °F (37.1 °C)   Resp 19   Ht 5' 4\" (1.626 m)   Wt 51.3 kg (113 lb)   SpO2 100%   Breastfeeding No   BMI 19.40 kg/m²       Blood Glucose/Hemoglobin A1c  Lab Results   Component Value Date/Time    Glucose 103 (H) 12/20/2022 05:57 AM       Lab Results   Component Value Date/Time    Hemoglobin A1c 5.8 (H) 12/15/2022 06:10 AM        Lipid Panel  Lab Results   Component Value Date/Time    Cholesterol, total 178 12/15/2022 06:10 AM    HDL Cholesterol 85 12/15/2022 06:10 AM    LDL, calculated 78.8 12/15/2022 06:10 AM    Triglyceride 71 12/15/2022 06:10 AM    CHOL/HDL Ratio 2.1 12/15/2022 06:10 AM        Discharge Diagnosis: Shizoaffective Disorder, Bipolar Type F 25.0    Discharge Plan: Patient is returning to her home with her  and her granddaughter who lives with them part time. She is 9. Patients son passed away. Discharge Medication List and Instructions:   Current Discharge Medication List        START taking these medications    Details   ziprasidone (GEODON) 80 mg capsule Take 1 Capsule by mouth two (2) times daily (with meals).   Qty: 60 Capsule, Refills: 1  Start date: 12/22/2022           STOP taking these medications       Caplyta 42 mg capsule Comments:   Reason for Stopping:         ibandronate (BONIVA) 150 mg tablet Comments:   Reason for Stopping:         conjugated estrogens (PREMARIN) 0.625 mg/gram vaginal cream Comments:   Reason for Stopping:         glucosamine-chondroitin ([facility]) 500-400 mg cap Comments:   Reason for Stopping:         vitamin e (E GEMS) 100 unit capsule Comments:   Reason for Stopping:         ARIPiprazole (ABILIFY) 5 mg tablet Comments:   Reason for Stopping:         TURMERIC ROOT EXTRACT PO Comments:   Reason for Stopping:         ginkgo biloba (GINKOBA PO) Comments:   Reason for Stopping:         multivitamin (ONE A DAY) tablet Comments:   Reason for Stopping:         [id number]-Fish Oil 070--154 mg-mg-mcg-mcg cap Comments:   Reason for Stopping:               Unresulted Labs (24h ago, onward)      None          To obtain results of studies pending at discharge, please contact 1 [phone]. Follow-up Information       Follow up With Specialties Details Why America Anthony MD General Practice   [address] [place] [address]  [address]  [phone]              Advanced Directive:   Does the patient have an appointed surrogate decision maker? Yes  Does the patient have a Medical Advance Directive? Yes  Does the patient have a Psychiatric Advance Directive? Yes  If the patient does not have a surrogate or Medical Advance Directive AND Psychiatric Advance Directive, the patient was offered information on these advance directives Other      Patient Instructions: Please continue all medications until otherwise directed by physician. Tobacco Cessation Discharge Plan:   Is the patient a smoker and needs referral for smoking cessation? Not applicable  Patient referred to the following for smoking cessation with an appointment? Not applicable     Patient was offered medication to assist with smoking cessation at discharge? Not applicable  Was education for smoking cessation added to the discharge instructions? Not applicable    Alcohol/Substance Abuse Discharge Plan:   Does the patient have a history of substance/alcohol abuse and requires a referral for treatment? Not applicable  Patient referred to the following for substance/alcohol abuse treatment with an appointment?  Not applicable  Patient was offered medication to assist with alcohol cessation at discharge? Not applicable  Was education for substance/alcohol abuse added to discharge instructions? Not applicable    Patient discharged to Home.   She has a follow up appointment with her psychiatrist, Dr Coby Linares with [facility] on January 16, 2023 at 4:20 pm

## 2022-12-23 NOTE — PROGRESS NOTES
Problem: Falls - Risk of  Goal: *Absence of Falls  Description: Document Vernia Colder Fall Risk and appropriate interventions in the flowsheet.   Outcome: Progressing Towards Goal  Note: Fall Risk Interventions:            Medication Interventions: Teach patient to arise slowly         History of Falls Interventions: Door open when patient unattended         Problem: Depressed Mood (Adult/Pediatric)  Goal: *STG: Participates in treatment plan  Outcome: Progressing Towards Goal  Goal: *STG: Remains safe in hospital  Outcome: Progressing Towards Goal  Goal: *STG: Complies with medication therapy  Outcome: Progressing Towards Goal

## 2022-12-23 NOTE — ROUTINE PROCESS
Shift change report given to Oskar Quintanilla, re: JOSEFINA. Medications, and behavior.   Anila Fall Risk Score (1)

## 2022-12-23 NOTE — PROGRESS NOTES
12/22/22 [address] meeting   Attendance Attended   Number of participants 3   Time in 2000   Time out 2030   Total Time 30   Interventions/techniques Supported   Follows directions Followed directions   Interactions Interacted appropriately   Mental Status Calm   Behavior/appearance Cooperative   Suicide Risk Factors No thoughts of self harm. Suicide Protective Factors Denies intent   Goals Achieved Able to engage in interactions     She has no anxiety, depression, or pain at this time.

## 2022-12-23 NOTE — BH NOTES
Affect blunted, mood anxious. Pt attended and participated in scheduled group activities. Pt was minimally social with staff and peers. Pt vital signs stable with no complaints of pain. Pt ate 100% of snack. Pt denies SI/HI/AVH. Pt continues to pray repeatedly in her room but is not as loud as previously. Pt  has no scheduled medications for evening/night and did not request a PRN. Pt is in no apparent distress at this time and made no delusional statements. Pt rested in her bed with her eyes closed for 4.5 hours.

## 2022-12-23 NOTE — GROUP NOTE
PAT  GROUP DOCUMENTATION INDIVIDUAL                                                                          Group Therapy Note    Date: 12/23/2022    Group Start Time: 0900  Group End Time: 2699  Group Topic: Process Group - Inpatient    [facility] OP    [place], [address] [facility] GROUP DOCUMENTATION GROUP    Group Therapy Note    Focus of group session was a discussion on anxiety and what triggers anxiety and what anxiety feels like for each group member. We spoke about grounding techniques and the importance of returning to the present moment when anxiety is high and thoughts are starting to race and become irrational and fearful. We spoke about the techniques shared in an article from \"[email or web address]\" and the techniques include square breathing, developing mantras, touching and describing objects around us, and using declarative memory (name all the dog breeds you know, colors.)  We spoke about what techniques can be helpful for each group member.          Attendance: brianna{[facility] GROUP [facility] ATTENDANCE:[id number]}    Patient's Goal:  ***    Interventions/techniques: {[facility] GROUP DOC INTERVENTIONS/TECHNIQUES:71423}    Follows Directions: {[facility] GROUP [facility] FOLLOWS DIRECTION:98403}    Interactions: {[facility] GROUP [facility] INTERACTIONS:24563}    Mental Status: {[facility] GROUP DOC MENTAL STATUS:84653}    Behavior/appearance: {[facility] GROUP DOC BEHAVIOR/APPEARANCE:39446}    Goals Achieved: {[facility] GROUP DOC GOALS ACHIEVED:31006}      Additional Notes:  ***    Rome Esteves

## 2022-12-23 NOTE — PROGRESS NOTES
INTERVAL Hx:  12/23- Records and clinical information were reviewed. Pt set to discharge today per Dr. King Jaramillo orders. Pt states she feels ready to discharge. She feels her mood overall is \"not too bad. \" Pt denies current or recent SI/plan/intent, denies HI/plan/intent. When asked about perceptual disturbances, she answers \"I don't want to talk about those things. \" Sleep remains poor, about 5 hours last night, but this is about baseline, not a change. Appetite is OK. Pt denies any side effects from medication, no sx of EPS/TD reported or observed. No acute incidents reported. Pt stable for discharge. MEDS:  Current Facility-Administered Medications   Medication Dose Route Frequency    ziprasidone (GEODON) capsule 80 mg  80 mg Oral BID WITH MEALS    Or    ziprasidone (GEODON) 10 mg in sterile water (preservative free) 0.5 mL injection  10 mg IntraMUSCular BID WITH MEALS    ziprasidone (GEODON) 20 mg in sterile water (preservative free) 1 mL injection  20 mg IntraMUSCular Q12H PRN    acetaminophen (TYLENOL) tablet 650 mg  650 mg Oral Q4H PRN    ibuprofen (MOTRIN) tablet 400 mg  400 mg Oral Q8H PRN    magnesium hydroxide (MILK OF MAGNESIA) 400 mg/5 mL oral suspension 30 mL  30 mL Oral DAILY PRN    alum-mag hydroxide-simeth (MYLANTA) oral suspension 30 mL  30 mL Oral Q4H PRN    traZODone (DESYREL) tablet 50 mg  50 mg Oral QHS PRN    hydrOXYzine pamoate (VISTARIL) capsule 50 mg  50 mg Oral TID PRN       VITALS: Patient Vitals for the past 12 hrs:   Temp Pulse Resp BP SpO2   12/23/22 0754 98.7 °F (37.1 °C) (!) 109 19 138/67 100 %       LABS: .No results found for this or any previous visit (from the past 24 hour(s)).       MSE:   Appearance: casually dressed; adequately groomed  Behavior: calm and cooperative  Motor: normal  Mood/Affect:  close to euthymic; stable  Thought Process: fairly organized   Thought Content: minimally hyperreligious; denies SI/HI  Perceptions: no obvious AH's  Insight/Judgment: limited    ASSESSMENT:   1.  Schizoaffective disorder, bipolar type (F25.0)    PLAN:  1. Continue the Geodon at 80 mg BID with meals. 2.  D/C today per Dr. Earl Figueredo orders -F/U with Dr. Charly Sanz (already scheduled, per ).

## 2022-12-23 NOTE — BH NOTES
Affect blunted mood anxious. Ate 90% of meal.  All belongings returned to patient. Verbalized understanding of DC instructions. Denies SI/HI/AVH/pain. Med compliant. DC with   at 200. Instructed to make appt with Dr. Krishan Ayon in 2 weeks.

## 2022-12-23 NOTE — BH NOTES
Patient will be discharged to home today. Her  will be picking her up. She has a follow up appointment with Dr. Coby Linares who is psychiatrist on January 16 th at 4:20 PM.  He is with [address] [place] [address].

## 2023-01-24 ENCOUNTER — TELEPHONE (OUTPATIENT)
Dept: NEUROLOGY | Age: 58
End: 2023-01-24

## 2023-01-31 ENCOUNTER — TELEPHONE (OUTPATIENT)
Dept: NEUROLOGY | Age: 58
End: 2023-01-31

## 2023-02-07 ENCOUNTER — TELEPHONE (OUTPATIENT)
Dept: NEUROLOGY | Age: 58
End: 2023-02-07

## 2023-02-07 DIAGNOSIS — R41.3 MEMORY LOSS: ICD-10-CM

## 2023-02-07 DIAGNOSIS — R41.0 CONFUSION: Primary | ICD-10-CM

## 2023-02-07 NOTE — TELEPHONE ENCOUNTER
Olimpia Piedra from [facility] is asking for a new routine eeg order to be put in so the patient can have it done at the hospital.

## 2023-02-15 ENCOUNTER — HOSPITAL ENCOUNTER (OUTPATIENT)
Dept: MRI IMAGING | Age: 58
Discharge: HOME | End: 2023-02-15
Payer: OTHER GOVERNMENT

## 2023-02-15 DIAGNOSIS — R41.0 CONFUSION: ICD-10-CM

## 2023-02-15 DIAGNOSIS — R41.3 MEMORY LOSS: ICD-10-CM

## 2023-02-15 PROCEDURE — A9577 INJ MULTIHANCE: HCPCS | Performed by: PSYCHIATRY & NEUROLOGY

## 2023-02-15 PROCEDURE — 74011250636 HC RX REV CODE- 250/636: Performed by: PSYCHIATRY & NEUROLOGY

## 2023-02-15 PROCEDURE — 70553 MRI BRAIN STEM W/O & W/DYE: CPT

## 2023-02-15 RX ADMIN — GADOBENATE DIMEGLUMINE 10 ML: 529 INJECTION, SOLUTION INTRAVENOUS at 14:49

## 2023-02-27 ENCOUNTER — HOSPITAL ENCOUNTER (OUTPATIENT)
Dept: NEUROLOGY | Age: 58
Discharge: HOME | End: 2023-02-27
Attending: PSYCHIATRY & NEUROLOGY
Payer: OTHER GOVERNMENT

## 2023-02-27 DIAGNOSIS — R41.3 MEMORY LOSS: ICD-10-CM

## 2023-02-27 DIAGNOSIS — R41.0 CONFUSION: ICD-10-CM

## 2023-02-27 PROCEDURE — 95816 EEG AWAKE AND DROWSY: CPT | Performed by: PSYCHIATRY & NEUROLOGY

## 2023-02-28 PROCEDURE — 95819 EEG AWAKE AND ASLEEP: CPT | Performed by: PSYCHIATRY & NEUROLOGY

## 2023-02-28 NOTE — PROCEDURES
[facility] [address]     Electroencephalogram Report    Procedure ID: [id number] Procedure Date: 02/27/2023   Patient Name: Alexis Gonzales YOB: 1965   Procedure Type: Routine Medical Record No: [id number]     INDICATION:  cognitive impairment     STATE:  awake and sleep . DESCRIPTION OF PROCEDURE: Electrodes were applied in accordance with the international 10-20 system of electrode placement. EEG was reviewed in both bipolar and referential montages. Description of Activity:  During wakefulness, there is continuous runs of 9 Hz symmetric posterior alpha rhythm, that attenuate symmetrically with eye opening. Low voltage beta activity occurs symmetrically at the anterior head regions bilaterally. During drowsiness, there is attenuation of the alpha rhythm and low voltage theta activity occurs bilaterally. K complexes, vertex sharp waves and sleep spindles occur and are symmetric. Intermittent photic stimulation was performed and did not induce posterior driving responses. Hyperventilation was performed for 3 minutes with good effort induces moderate build-up of bilateral slow wave activity. No sharp or spike discharges, seizures or epileptiform discharges seen. No focal asymmetry. Clinical Interpretation: This EEG, performed during wakefulness and sleep is normal. There is no focal asymmetry, seizures or epileptiform discharges seen. Medications:  Current Outpatient Medications   Medication Sig    ziprasidone (GEODON) 80 mg capsule Take 1 Capsule by mouth two (2) times daily (with meals). No current facility-administered medications for this encounter.

## 2023-03-09 ENCOUNTER — DOCUMENTATION ONLY (OUTPATIENT)
Dept: NEUROLOGY | Age: 58
End: 2023-03-09

## 2023-05-17 RX ORDER — ZIPRASIDONE HYDROCHLORIDE 80 MG/1
80 CAPSULE ORAL 2 TIMES DAILY WITH MEALS
COMMUNITY
Start: 2022-12-22

## 2023-07-13 ENCOUNTER — OFFICE VISIT (OUTPATIENT)
Age: 58
End: 2023-07-13
Payer: OTHER GOVERNMENT

## 2023-07-13 VITALS
HEART RATE: 91 BPM | BODY MASS INDEX: 19.29 KG/M2 | OXYGEN SATURATION: 98 % | DIASTOLIC BLOOD PRESSURE: 51 MMHG | HEIGHT: 64 IN | WEIGHT: 113 LBS | SYSTOLIC BLOOD PRESSURE: 103 MMHG | RESPIRATION RATE: 20 BRPM

## 2023-07-13 DIAGNOSIS — R41.89 PSEUDODEMENTIA: Primary | ICD-10-CM

## 2023-07-13 PROCEDURE — 99213 OFFICE O/P EST LOW 20 MIN: CPT | Performed by: PSYCHIATRY & NEUROLOGY

## 2023-07-13 RX ORDER — ARIPIPRAZOLE 10 MG/1
TABLET ORAL
COMMUNITY
Start: 2023-06-21

## 2023-07-13 ASSESSMENT — PATIENT HEALTH QUESTIONNAIRE - PHQ9
SUM OF ALL RESPONSES TO PHQ9 QUESTIONS 1 & 2: 0
SUM OF ALL RESPONSES TO PHQ QUESTIONS 1-9: 0
1. LITTLE INTEREST OR PLEASURE IN DOING THINGS: 0
SUM OF ALL RESPONSES TO PHQ QUESTIONS 1-9: 0
2. FEELING DOWN, DEPRESSED OR HOPELESS: 0

## 2024-11-30 NOTE — PROGRESS NOTES
Goal Outcome Evaluation:              Outcome Evaluation: patient was educated on discharge instructions. patient verbalized understanding and was given written handout. patient passed walk test.                              Problem: Falls - Risk of  Goal: *Absence of Falls  Description: Document Teresa Ramírez Fall Risk and appropriate interventions in the flowsheet.   Outcome: Progressing Towards Goal  Note: Fall Risk Interventions:            Medication Interventions: Teach patient to arise slowly         History of Falls Interventions: Door open when patient unattended         Problem: Depressed Mood (Adult/Pediatric)  Goal: *STG: Participates in treatment plan  Outcome: Progressing Towards Goal  Goal: *STG: Remains safe in hospital  Outcome: Progressing Towards Goal  Goal: *STG: Complies with medication therapy  Outcome: Progressing Towards Goal

## 2025-01-07 NOTE — GROUP NOTE
[facility] GROUP DOCUMENTATION INDIVIDUAL                                                                          Group Therapy Note    Date: 12/13/2022    Group Start Time: 0900  Group End Time: 6028  Group Topic: Process Group - Inpatient    [facility] OP    [place], [address] [facility] GROUP DOCUMENTATION GROUP    Group Therapy Note    Focus of session was based on an idea developed by Leonila Matthew (possibility therapy) and Marcos Stevens, Ph.D. (solution-focused therapy). It is called Do One Thing Different and it is supported by 12 step's motto and definition of insanity is doing the same thing over and over again and expecting different results.   I spoke with group members about the idea is to think about the things you do in a problem situation and change any part you can. We spoke about how we can change our reaction, our attitude, our tone of voice, our behavior, we can think about what others do to help themselves and what works and what has worked for us in the past.  We also spoke about picturing life and what we would feel like without this problem in it. I asked group members to identify one small thing that they can do different and how it can impact their life. Attendance: Attended    Patient's Goal:      Verbalizes anger, guilt, and other feelings in a constructive manor           Interventions/techniques: Informed, Validated, Reinforced, and Supported    Follows Directions: Followed directions    Interactions: Interacted appropriately    Mental Status: Anxious and Flat    Behavior/appearance: Agitated, Attentive, and Poor eye contact    Goals Achieved: Able to engage in interactions, Able to listen to others, Able to self-disclose, Discussed coping, Discussed safety plan, Identified feelings, and Identified medication adherence strategies      Additional Notes:  Pt attended group but was quiet for most of the session and displayed a flat affect.  She did state that she did take the Patient would like communication of their results via:    Cell Phone:   Telephone Information:   Mobile [phone]     Okay to leave a message containing results? Yes     Health Maintenance Due   Topic Date Due    Influenza Vaccine (1) 09/01/2024     Patient is due for the topics as listed above and wishes to proceed with them. .                added medication that Dr. Caitlin Esquivel prescribed for her this morning but she did not agree with it. She stated that she did not believe she needed that dose. She went to the bathroom twice during group asking me to get the nurse so she could explain her urination. .I did notify the nurse and she checked with the pt. At the end of the session the pt repeatedly asked me to call the police for her. I made the nurse, Dr. Caitlin Esquivel and Yaakov Noyola aware that she was requesting this.       Haven Cousin